# Patient Record
Sex: FEMALE | Race: WHITE | HISPANIC OR LATINO | Employment: FULL TIME | URBAN - METROPOLITAN AREA
[De-identification: names, ages, dates, MRNs, and addresses within clinical notes are randomized per-mention and may not be internally consistent; named-entity substitution may affect disease eponyms.]

---
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
## 2018-01-09 ENCOUNTER — ROUTINE PRENATAL (OUTPATIENT)
Dept: OBGYN | Facility: CLINIC | Age: 18
End: 2018-01-09
Payer: COMMERCIAL

## 2018-01-09 VITALS — DIASTOLIC BLOOD PRESSURE: 68 MMHG | SYSTOLIC BLOOD PRESSURE: 112 MMHG | WEIGHT: 229 LBS | BODY MASS INDEX: 39.31 KG/M2

## 2018-01-09 DIAGNOSIS — Z98.891 HISTORY OF PRIMARY CESAREAN SECTION: ICD-10-CM

## 2018-01-09 DIAGNOSIS — Z78.9 NOT IMMUNE TO RUBELLA: ICD-10-CM

## 2018-01-09 DIAGNOSIS — O09.93 SUPERVISION OF HIGH RISK PREGNANCY IN THIRD TRIMESTER: ICD-10-CM

## 2018-01-09 PROCEDURE — 90040 PR PRENATAL FOLLOW UP: CPT | Performed by: NURSE PRACTITIONER

## 2018-01-09 NOTE — PROGRESS NOTES
S: Joelle Morrissey at [id number] for OB  follow up visit.  Patient  Has no complaints of  Today   Fetal movement is +  Denies any loss of fluid, vaginal bleeding or contractions.    O: VSS  Urine dip  NE   See above values  Cervical exam NE    A:  multip 31w2d  High risk  HS of C/S   Desires     P:   3rd  Trimester Guidance and comfort measures, diet and exercise review.  Labs:  None due   Fetal Kick Count continue   RTC in 2 weeks   Already received flu and Tdap

## 2018-01-09 NOTE — PROGRESS NOTES
Pt here today for OB follow up  Pt states no complaints   Reports +  Good # [phone]  Pharmacy Confirmed.

## 2018-01-24 ENCOUNTER — ROUTINE PRENATAL (OUTPATIENT)
Dept: OBGYN | Facility: CLINIC | Age: 18
End: 2018-01-24
Payer: MEDICAID

## 2018-01-24 VITALS — BODY MASS INDEX: 39.65 KG/M2 | DIASTOLIC BLOOD PRESSURE: 64 MMHG | SYSTOLIC BLOOD PRESSURE: 120 MMHG | WEIGHT: 231 LBS

## 2018-01-24 DIAGNOSIS — Z98.891 HISTORY OF PRIMARY CESAREAN SECTION: ICD-10-CM

## 2018-01-24 DIAGNOSIS — Z78.9 NOT IMMUNE TO RUBELLA: ICD-10-CM

## 2018-01-24 PROCEDURE — 90040 PR PRENATAL FOLLOW UP: CPT | Performed by: PHYSICIAN ASSISTANT

## 2018-01-24 NOTE — PROGRESS NOTES
Pt has no complaints with cramping, UCs, Vb, LOF, though pt has had incr pressure and more vag d/c only. No vag itching or burning. +FM. PTL precautions reviewed. GBS next visit. Pt notes a little more stress this week as her child broke her arm and needs surgery this Friday. RTC 2 wk or sooner prn.

## 2018-02-09 ENCOUNTER — ROUTINE PRENATAL (OUTPATIENT)
Dept: OBGYN | Facility: CLINIC | Age: 18
End: 2018-02-09
Payer: MEDICAID

## 2018-02-09 VITALS — WEIGHT: 235 LBS | SYSTOLIC BLOOD PRESSURE: 128 MMHG | DIASTOLIC BLOOD PRESSURE: 70 MMHG | BODY MASS INDEX: 40.34 KG/M2

## 2018-02-09 DIAGNOSIS — O09.93 SUPERVISION OF HIGH RISK PREGNANCY IN THIRD TRIMESTER: Primary | ICD-10-CM

## 2018-02-09 DIAGNOSIS — Z78.9 NOT IMMUNE TO RUBELLA: ICD-10-CM

## 2018-02-09 DIAGNOSIS — Z98.891 HISTORY OF PRIMARY CESAREAN SECTION: ICD-10-CM

## 2018-02-09 PROCEDURE — 90040 PR PRENATAL FOLLOW UP: CPT | Performed by: PHYSICIAN ASSISTANT

## 2018-02-09 NOTE — PROGRESS NOTES
Pt has no complaints with cramping, UCs, Vb, LOF, though pt has had incr pressure only. +FM. GBS done today. Labor precautions reviewed. Daily FKC and walks recommended. Cervix: Cl/50/-3, post, med, vtx. C/S referral sent today for 40 wk, as pt hoping for TOLAC. RTC 1 wk or sooner prn.

## 2018-02-09 NOTE — PROGRESS NOTES
Pt. here for Ob f/u and GBS today. Good # [phone]  Good FM  Pt states feeling really bloated.   Pharmacy verified.

## 2018-02-15 NOTE — PROGRESS NOTES
Patient is scheduled for Pre OP with Dr Moreland on 03/06/18 at 9am  Patient is scheduled for C/S with Dr Moreland on 03/11/18 at 9:30am  Ppt notified, please give instructions next visit. Thanks.

## 2018-02-16 ENCOUNTER — ROUTINE PRENATAL (OUTPATIENT)
Dept: OBGYN | Facility: CLINIC | Age: 18
End: 2018-02-16
Payer: MEDICAID

## 2018-02-16 VITALS — WEIGHT: 239 LBS | DIASTOLIC BLOOD PRESSURE: 60 MMHG | BODY MASS INDEX: 41.02 KG/M2 | SYSTOLIC BLOOD PRESSURE: 116 MMHG

## 2018-02-16 DIAGNOSIS — Z78.9 NOT IMMUNE TO RUBELLA: ICD-10-CM

## 2018-02-16 DIAGNOSIS — O09.93 SUPERVISION OF HIGH RISK PREGNANCY IN THIRD TRIMESTER: ICD-10-CM

## 2018-02-16 DIAGNOSIS — Z98.891 HISTORY OF PRIMARY CESAREAN SECTION: ICD-10-CM

## 2018-02-16 PROCEDURE — 90040 PR PRENATAL FOLLOW UP: CPT | Performed by: PHYSICIAN ASSISTANT

## 2018-02-16 NOTE — PROGRESS NOTES
Pt 36.5wks presents with no concerns. She reports lots of FM, kicking in her stomach, no UC, LOF or VB.  She has experienced some tightening of the abdomen on one side but relieved with rest. She has noticed swelling in her feet and hands, has removed her rings.  She drinks 4L of water per day but has been craving and eating lime and joelen with salt.  Recommended decreasing salt.   Vertex presentation by Leopolds  FH 38cm  FHT 145bpm  Reviewed labor signs and symptoms of labor. Was induced last time and emergency C/S  C/S scheduled 3/11/18 and preop with Dr. Moreland scheduled 3/6/18  GBS swab repeated due to [facility] losing last swab.   Written by Twan Storey RN, FNP student    Pt care and exam overseen by myself and I agree with all the above. LAKISHA Westfall PA-C

## 2018-02-16 NOTE — PROGRESS NOTES
Pt. Here for OB/FU today. Reports Good FM.   Good # [phone]  Pt states having cramping that comes and goes.   Pharmacy verified.   Patient is scheduled for Pre OP with Dr Moreland on 03/06/18 at 9am  Patient is scheduled for C/S with Dr Moreland on 03/11/18 at 9:30am, pt notified and given instructions today.

## 2018-02-23 ENCOUNTER — ROUTINE PRENATAL (OUTPATIENT)
Dept: OBGYN | Facility: CLINIC | Age: 18
End: 2018-02-23
Payer: MEDICAID

## 2018-02-23 VITALS — DIASTOLIC BLOOD PRESSURE: 58 MMHG | SYSTOLIC BLOOD PRESSURE: 112 MMHG | BODY MASS INDEX: 40.85 KG/M2 | WEIGHT: 238 LBS

## 2018-02-23 DIAGNOSIS — Z98.891 HISTORY OF PRIMARY CESAREAN SECTION: ICD-10-CM

## 2018-02-23 DIAGNOSIS — Z78.9 NOT IMMUNE TO RUBELLA: ICD-10-CM

## 2018-02-23 DIAGNOSIS — O09.93 SUPERVISION OF HIGH RISK PREGNANCY IN THIRD TRIMESTER: Primary | ICD-10-CM

## 2018-02-23 PROCEDURE — 90040 PR PRENATAL FOLLOW UP: CPT | Performed by: NURSE PRACTITIONER

## 2018-02-23 NOTE — PROGRESS NOTES
Pt here today for OB follow up  Reports +FM  WT: 238 lb  BP: 112/58  Patient is scheduled for Pre OP with Dr Moreland on 03/06/18 at 9am. Patient is scheduled for C/S with Dr Moreland on 03/11/18 at 9:30 am. Pt notified and instructions given today.  Pt states no complaints today  Good # [phone]

## 2018-02-23 NOTE — PATIENT INSTRUCTIONS
P:  1.  Continue FKCs.         2.  Labor precautions given.  Instructions given on where to go.  Pt receptive to education.         3.  D/w pt TOLAC policy.  RCS info and pre op given to pt.        4.  Questions answered.         5.  Encouraged adequate water intake       6.  F/u 1wk

## 2018-02-23 NOTE — PROGRESS NOTES
S:  Pt is  at 37w5d here for routine OB follow up.  No c/o.  Reports good FM.  Denies VB, LOF, RUCs, or vaginal DC.     O:  Please see above vitals.        FHTs: 145        Fundal ht: 37        Fetal position: vertex        SVE: deferred        GBS neg on 18 -- reviewed w pt.      A:  IUP at 37w5d  Patient Active Problem List    Diagnosis Date Noted   • Supervision of high risk pregnancy in third trimester 2017   • Vaginal laceration 10/24/2017   • Not immune to rubella 10/18/2017   • History of C/S x 1 - desires TOLAC (consent signed 10/5/17), no BTL desired 2017       P:  1.  Continue FKCs.         2.  Labor precautions given.  Instructions given on where to go.  Pt receptive to education.         3.  D/w pt TOLAC policy.  RCS info and pre op given to pt.        4.  Questions answered.         5.  Encouraged adequate water intake       6.  F/u 1wk

## 2018-03-06 ENCOUNTER — ROUTINE PRENATAL (OUTPATIENT)
Dept: OBGYN | Facility: CLINIC | Age: 18
End: 2018-03-06
Payer: COMMERCIAL

## 2018-03-06 VITALS — BODY MASS INDEX: 41.54 KG/M2 | WEIGHT: 242 LBS | DIASTOLIC BLOOD PRESSURE: 56 MMHG | SYSTOLIC BLOOD PRESSURE: 108 MMHG

## 2018-03-06 DIAGNOSIS — Z78.9 NOT IMMUNE TO RUBELLA: ICD-10-CM

## 2018-03-06 DIAGNOSIS — Z98.891 HISTORY OF PRIMARY CESAREAN SECTION: ICD-10-CM

## 2018-03-06 PROCEDURE — 90040 PR PRENATAL FOLLOW UP: CPT | Performed by: OBSTETRICS & GYNECOLOGY

## 2018-03-06 NOTE — H&P
PREOPERATIVE DIAGNOSIS:  Previous  x1.    PLANNED PROCEDURE:  Repeat low transverse .    HISTORY AND PHYSICAL:  This is a 18-year-old G3, P1-0-1-1 whose estimated date   of delivery is  based on an 8-week ultrasound.  Patient is without   complaint.  She reports good fetal movement, no contractions, vaginal bleeding   or loss of fluid.    REVIEW OF SYSTEMS:  Negative for shortness of breath, chest pain, nausea or   vomiting or fever.    PAST MEDICAL HISTORY:  None.    PAST SURGICAL HISTORY:   x1.    MEDICATIONS:  Prenatal vitamins.    ALLERGIES:  None.    SOCIAL HISTORY:  Patient denies tobacco, ethanol or drugs.    OBSTETRICAL HISTORY:  Complicated by previous  delivery for fetal   intolerance to labor.    PHYSICAL EXAMINATION:  GENERAL:  She is a morbidly obese female weighing 242 pounds.  VITAL SIGNS:  Blood pressure is within normal limits.  HEART:  Regular rate and rhythm.  LUNGS:  Clear to auscultation bilaterally.  ABDOMEN:  Gravid with a fundal height of 39 cm.  EXTREMITIES:  Show no clubbing, cyanosis or edema.  Fetal heart tones are   documented in the 140s.    PERTINENT PREOPERATIVE LABORATORY EVALUATION:  Patient's blood type is AB   positive, antibody screen negative, platelets 313; 1-hour normal, rubella is   immune, hepatitis B immune, GC chlamydia negative, GBS negative, HIV and   syphilis screening also negative.    ASSESSMENT AND PLAN:  This is a 18-year-old G3, P1-0-1-1 with a pregnancy at   term and a history of previous  delivery.  Patient is scheduled for   repeat .  Today, I discussed with the patient indications for    and risks associated with , which can include infection,   bleeding, scar, damage to bowel, bladder or ureters, emergency blood   transfusion and emergency hysterectomy.  Patient voiced understanding of the   risks as described and agrees to proceed as scheduled.        ____________________________________     MD SOULEYMANE WATTS    DD:  03/06/2018 09:35:20  DT:  03/06/2018 09:48:15    D#:  [id number]  Job#:  [id number]

## 2018-03-06 NOTE — PROGRESS NOTES
Joelle Morrissey is a 18 y.o.  at 39w2d here today for obstetrical visit.  Patient is without complaints.    She reports good fetal movement.  She denies vaginal bleeding.  She denies rupture of membranes.  She denies contractions.     has History of C/S x 1 - desires TOLAC (consent signed 10/5/17), no BTL desired; Not immune to rubella; Vaginal laceration; and Supervision of high risk pregnancy in third trimester on her problem list.    Past medical history is reviewed and updated  Past surgical history is reviewed and updated  Medications reviewed and updated  Allergies reviewed and updated  Social history reviewed and updated  Family history reviewed and updated    Discussed with the patient indications for  delivery. The patient voiced understanding of indications for  section at this time.    Discussed with the patient the risks of  delivery. The risks include infection, bleeding, scarring, damage to other organs in the area of operation. Specifically organs that can be damaged are bowel, bladder, ureters. I also discussed with the patient the risk of wound infection and wound breakdown. We discussed that these risks are greater in people that have diabetes or obesity. I also discussed the risk of emergency blood transfusion during procedure as well as emergency hysterectomy during procedure.      Patient had the opportunity to ask questions regarding procedures. All questions answered to the patient's satisfaction.  Proceed with  delivery    A/P IUP at 39w2d  AFP done  1 hour glucola done  Rhogam done  GBS done    F/U in prn weeks

## 2018-03-06 NOTE — PROGRESS NOTES
Pre-op for repeat C/S OB F/U  Denies VB, LOF, or UC  +FM  Phone#: [phone]  Pharmacy Confirmed.  C/O: tighting  of th belly, but denies UC   Patient is scheduled for Pre OP with Dr Moreland on 03/06/18 at 9am  Patient is scheduled for C/S with Dr Moreland on 03/11/18 at 9:30am

## 2018-03-07 ENCOUNTER — APPOINTMENT (OUTPATIENT)
Dept: ADMISSIONS | Facility: MEDICAL CENTER | Age: 18
End: 2018-03-07
Attending: OBSTETRICS & GYNECOLOGY
Payer: MEDICAID

## 2018-03-11 ENCOUNTER — HOSPITAL ENCOUNTER (INPATIENT)
Facility: MEDICAL CENTER | Age: 18
LOS: 3 days | End: 2018-03-14
Attending: OBSTETRICS & GYNECOLOGY | Admitting: OBSTETRICS & GYNECOLOGY
Payer: MEDICAID

## 2018-03-11 DIAGNOSIS — G89.18 POSTOPERATIVE PAIN: ICD-10-CM

## 2018-03-11 LAB
BASOPHILS # BLD AUTO: 0.3 % (ref 0–1.8)
BASOPHILS # BLD: 0.04 K/UL (ref 0–0.12)
EOSINOPHIL # BLD AUTO: 0.1 K/UL (ref 0–0.51)
EOSINOPHIL NFR BLD: 0.8 % (ref 0–6.9)
ERYTHROCYTE [DISTWIDTH] IN BLOOD BY AUTOMATED COUNT: 43.4 FL (ref 35.9–50)
HCT VFR BLD AUTO: 40.3 % (ref 37–47)
HGB BLD-MCNC: 13.4 G/DL (ref 12–16)
HOLDING TUBE BB 8507: NORMAL
IMM GRANULOCYTES # BLD AUTO: 0.05 K/UL (ref 0–0.11)
IMM GRANULOCYTES NFR BLD AUTO: 0.4 % (ref 0–0.9)
LYMPHOCYTES # BLD AUTO: 3.54 K/UL (ref 1–4.8)
LYMPHOCYTES NFR BLD: 29.1 % (ref 22–41)
MCH RBC QN AUTO: 26.7 PG (ref 27–33)
MCHC RBC AUTO-ENTMCNC: 33.3 G/DL (ref 33.6–35)
MCV RBC AUTO: 80.3 FL (ref 81.4–97.8)
MONOCYTES # BLD AUTO: 1.2 K/UL (ref 0–0.85)
MONOCYTES NFR BLD AUTO: 9.9 % (ref 0–13.4)
NEUTROPHILS # BLD AUTO: 7.22 K/UL (ref 2–7.15)
NEUTROPHILS NFR BLD: 59.5 % (ref 44–72)
NRBC # BLD AUTO: 0 K/UL
NRBC BLD-RTO: 0 /100 WBC
PLATELET # BLD AUTO: 298 K/UL (ref 164–446)
PMV BLD AUTO: 10.7 FL (ref 9–12.9)
RBC # BLD AUTO: 5.02 M/UL (ref 4.2–5.4)
WBC # BLD AUTO: 12.2 K/UL (ref 4.8–10.8)

## 2018-03-11 PROCEDURE — 700111 HCHG RX REV CODE 636 W/ 250 OVERRIDE (IP)

## 2018-03-11 PROCEDURE — 304964 HCHG RECOVERY ROOM TIME 1HR: Performed by: OBSTETRICS & GYNECOLOGY

## 2018-03-11 PROCEDURE — 700105 HCHG RX REV CODE 258: Performed by: ANESTHESIOLOGY

## 2018-03-11 PROCEDURE — 700102 HCHG RX REV CODE 250 W/ 637 OVERRIDE(OP): Performed by: OBSTETRICS & GYNECOLOGY

## 2018-03-11 PROCEDURE — 700111 HCHG RX REV CODE 636 W/ 250 OVERRIDE (IP): Performed by: OBSTETRICS & GYNECOLOGY

## 2018-03-11 PROCEDURE — 304966 HCHG RECOVERY SVSC TIME ADDL 1/2 HR: Performed by: OBSTETRICS & GYNECOLOGY

## 2018-03-11 PROCEDURE — 700105 HCHG RX REV CODE 258: Performed by: OBSTETRICS & GYNECOLOGY

## 2018-03-11 PROCEDURE — 04QY0ZZ REPAIR LOWER ARTERY, OPEN APPROACH: ICD-10-PCS | Performed by: OBSTETRICS & GYNECOLOGY

## 2018-03-11 PROCEDURE — 36415 COLL VENOUS BLD VENIPUNCTURE: CPT

## 2018-03-11 PROCEDURE — A9270 NON-COVERED ITEM OR SERVICE: HCPCS | Performed by: OBSTETRICS & GYNECOLOGY

## 2018-03-11 PROCEDURE — 305385 HCHG SURGICAL SERVICES 1/4 HOUR: Performed by: OBSTETRICS & GYNECOLOGY

## 2018-03-11 PROCEDURE — 700102 HCHG RX REV CODE 250 W/ 637 OVERRIDE(OP): Performed by: ANESTHESIOLOGY

## 2018-03-11 PROCEDURE — 306828 HCHG ANES-TIME GENERAL: Performed by: OBSTETRICS & GYNECOLOGY

## 2018-03-11 PROCEDURE — 59514 CESAREAN DELIVERY ONLY: CPT

## 2018-03-11 PROCEDURE — 770002 HCHG ROOM/CARE - OB PRIVATE (112)

## 2018-03-11 PROCEDURE — 700111 HCHG RX REV CODE 636 W/ 250 OVERRIDE (IP): Performed by: ANESTHESIOLOGY

## 2018-03-11 PROCEDURE — 85025 COMPLETE CBC W/AUTO DIFF WBC: CPT

## 2018-03-11 RX ORDER — NALOXONE HYDROCHLORIDE 0.4 MG/ML
0.1 INJECTION, SOLUTION INTRAMUSCULAR; INTRAVENOUS; SUBCUTANEOUS PRN
Status: DISCONTINUED | OUTPATIENT
Start: 2018-03-11 | End: 2018-03-12

## 2018-03-11 RX ORDER — MISOPROSTOL 200 UG/1
600 TABLET ORAL
Status: DISCONTINUED | OUTPATIENT
Start: 2018-03-11 | End: 2018-03-14 | Stop reason: HOSPADM

## 2018-03-11 RX ORDER — HYDROMORPHONE HYDROCHLORIDE 2 MG/ML
0.4 INJECTION, SOLUTION INTRAMUSCULAR; INTRAVENOUS; SUBCUTANEOUS
Status: ACTIVE | OUTPATIENT
Start: 2018-03-11 | End: 2018-03-12

## 2018-03-11 RX ORDER — CARBOPROST TROMETHAMINE 250 UG/ML
250 INJECTION, SOLUTION INTRAMUSCULAR
Status: DISCONTINUED | OUTPATIENT
Start: 2018-03-11 | End: 2018-03-11 | Stop reason: HOSPADM

## 2018-03-11 RX ORDER — MISOPROSTOL 200 UG/1
800 TABLET ORAL
Status: DISCONTINUED | OUTPATIENT
Start: 2018-03-11 | End: 2018-03-11

## 2018-03-11 RX ORDER — METHYLERGONOVINE MALEATE 0.2 MG/ML
0.2 INJECTION INTRAVENOUS
Status: DISCONTINUED | OUTPATIENT
Start: 2018-03-11 | End: 2018-03-14 | Stop reason: HOSPADM

## 2018-03-11 RX ORDER — MISOPROSTOL 200 UG/1
800 TABLET ORAL
Status: DISCONTINUED | OUTPATIENT
Start: 2018-03-11 | End: 2018-03-11 | Stop reason: HOSPADM

## 2018-03-11 RX ORDER — METOCLOPRAMIDE HYDROCHLORIDE 5 MG/ML
10 INJECTION INTRAMUSCULAR; INTRAVENOUS ONCE
Status: COMPLETED | OUTPATIENT
Start: 2018-03-11 | End: 2018-03-11

## 2018-03-11 RX ORDER — CITRIC ACID/SODIUM CITRATE 334-500MG
30 SOLUTION, ORAL ORAL ONCE
Status: COMPLETED | OUTPATIENT
Start: 2018-03-11 | End: 2018-03-11

## 2018-03-11 RX ORDER — KETOROLAC TROMETHAMINE 30 MG/ML
30 INJECTION, SOLUTION INTRAMUSCULAR; INTRAVENOUS EVERY 6 HOURS
Status: DISPENSED | OUTPATIENT
Start: 2018-03-11 | End: 2018-03-12

## 2018-03-11 RX ORDER — CEFAZOLIN SODIUM 1 G/3ML
1 INJECTION, POWDER, FOR SOLUTION INTRAMUSCULAR; INTRAVENOUS ONCE
Status: DISCONTINUED | OUTPATIENT
Start: 2018-03-11 | End: 2018-03-11

## 2018-03-11 RX ORDER — HYDROMORPHONE HYDROCHLORIDE 2 MG/ML
0.2 INJECTION, SOLUTION INTRAMUSCULAR; INTRAVENOUS; SUBCUTANEOUS
Status: ACTIVE | OUTPATIENT
Start: 2018-03-11 | End: 2018-03-12

## 2018-03-11 RX ORDER — SODIUM CHLORIDE, SODIUM LACTATE, POTASSIUM CHLORIDE, CALCIUM CHLORIDE 600; 310; 30; 20 MG/100ML; MG/100ML; MG/100ML; MG/100ML
INJECTION, SOLUTION INTRAVENOUS PRN
Status: DISCONTINUED | OUTPATIENT
Start: 2018-03-11 | End: 2018-03-14 | Stop reason: HOSPADM

## 2018-03-11 RX ORDER — METHYLERGONOVINE MALEATE 0.2 MG/ML
0.2 INJECTION INTRAVENOUS
Status: DISCONTINUED | OUTPATIENT
Start: 2018-03-11 | End: 2018-03-11

## 2018-03-11 RX ORDER — METOCLOPRAMIDE HYDROCHLORIDE 5 MG/ML
10 INJECTION INTRAMUSCULAR; INTRAVENOUS EVERY 6 HOURS PRN
Status: DISCONTINUED | OUTPATIENT
Start: 2018-03-11 | End: 2018-03-12

## 2018-03-11 RX ORDER — SIMETHICONE 80 MG
80 TABLET,CHEWABLE ORAL 4 TIMES DAILY PRN
Status: DISCONTINUED | OUTPATIENT
Start: 2018-03-11 | End: 2018-03-14 | Stop reason: HOSPADM

## 2018-03-11 RX ORDER — SODIUM CHLORIDE, SODIUM LACTATE, POTASSIUM CHLORIDE, CALCIUM CHLORIDE 600; 310; 30; 20 MG/100ML; MG/100ML; MG/100ML; MG/100ML
INJECTION, SOLUTION INTRAVENOUS CONTINUOUS
Status: DISCONTINUED | OUTPATIENT
Start: 2018-03-11 | End: 2018-03-11 | Stop reason: HOSPADM

## 2018-03-11 RX ORDER — DOCUSATE SODIUM 100 MG/1
100 CAPSULE, LIQUID FILLED ORAL 2 TIMES DAILY PRN
Status: DISCONTINUED | OUTPATIENT
Start: 2018-03-11 | End: 2018-03-14 | Stop reason: HOSPADM

## 2018-03-11 RX ORDER — OXYCODONE AND ACETAMINOPHEN 10; 325 MG/1; MG/1
1 TABLET ORAL EVERY 4 HOURS PRN
Status: DISPENSED | OUTPATIENT
Start: 2018-03-11 | End: 2018-03-12

## 2018-03-11 RX ORDER — DIPHENHYDRAMINE HYDROCHLORIDE 50 MG/ML
12.5 INJECTION INTRAMUSCULAR; INTRAVENOUS EVERY 6 HOURS PRN
Status: DISCONTINUED | OUTPATIENT
Start: 2018-03-11 | End: 2018-03-12

## 2018-03-11 RX ORDER — CEFAZOLIN SODIUM 1 G/3ML
1 INJECTION, POWDER, FOR SOLUTION INTRAMUSCULAR; INTRAVENOUS ONCE
Status: DISCONTINUED | OUTPATIENT
Start: 2018-03-11 | End: 2018-03-11 | Stop reason: HOSPADM

## 2018-03-11 RX ORDER — CARBOPROST TROMETHAMINE 250 UG/ML
250 INJECTION, SOLUTION INTRAMUSCULAR
Status: DISCONTINUED | OUTPATIENT
Start: 2018-03-11 | End: 2018-03-14 | Stop reason: HOSPADM

## 2018-03-11 RX ORDER — SODIUM CHLORIDE, SODIUM LACTATE, POTASSIUM CHLORIDE, CALCIUM CHLORIDE 600; 310; 30; 20 MG/100ML; MG/100ML; MG/100ML; MG/100ML
1500 INJECTION, SOLUTION INTRAVENOUS ONCE
Status: COMPLETED | OUTPATIENT
Start: 2018-03-11 | End: 2018-03-11

## 2018-03-11 RX ORDER — METHYLERGONOVINE MALEATE 0.2 MG/ML
0.2 INJECTION INTRAVENOUS
Status: DISCONTINUED | OUTPATIENT
Start: 2018-03-11 | End: 2018-03-11 | Stop reason: HOSPADM

## 2018-03-11 RX ORDER — OXYCODONE HYDROCHLORIDE AND ACETAMINOPHEN 5; 325 MG/1; MG/1
1 TABLET ORAL EVERY 4 HOURS PRN
Status: DISPENSED | OUTPATIENT
Start: 2018-03-11 | End: 2018-03-12

## 2018-03-11 RX ORDER — DIPHENHYDRAMINE HYDROCHLORIDE 50 MG/ML
25 INJECTION INTRAMUSCULAR; INTRAVENOUS EVERY 6 HOURS PRN
Status: DISCONTINUED | OUTPATIENT
Start: 2018-03-11 | End: 2018-03-12

## 2018-03-11 RX ADMIN — FENTANYL CITRATE 50 MCG: 50 INJECTION, SOLUTION INTRAMUSCULAR; INTRAVENOUS at 13:24

## 2018-03-11 RX ADMIN — Medication 125 ML/HR: at 14:30

## 2018-03-11 RX ADMIN — SODIUM CITRATE AND CITRIC ACID MONOHYDRATE 30 ML: 500; 334 SOLUTION ORAL at 10:56

## 2018-03-11 RX ADMIN — FAMOTIDINE 20 MG: 10 INJECTION, SOLUTION INTRAVENOUS at 10:56

## 2018-03-11 RX ADMIN — OXYCODONE HYDROCHLORIDE AND ACETAMINOPHEN 1 TABLET: 10; 325 TABLET ORAL at 16:11

## 2018-03-11 RX ADMIN — SODIUM CHLORIDE, POTASSIUM CHLORIDE, SODIUM LACTATE AND CALCIUM CHLORIDE: 600; 310; 30; 20 INJECTION, SOLUTION INTRAVENOUS at 10:57

## 2018-03-11 RX ADMIN — KETOROLAC TROMETHAMINE 30 MG: 30 INJECTION, SOLUTION INTRAMUSCULAR at 17:36

## 2018-03-11 RX ADMIN — SODIUM CHLORIDE, POTASSIUM CHLORIDE, SODIUM LACTATE AND CALCIUM CHLORIDE 1000 ML: 600; 310; 30; 20 INJECTION, SOLUTION INTRAVENOUS at 08:40

## 2018-03-11 RX ADMIN — METOCLOPRAMIDE 10 MG: 5 INJECTION, SOLUTION INTRAMUSCULAR; INTRAVENOUS at 10:56

## 2018-03-11 RX ADMIN — Medication 20 UNITS: at 13:25

## 2018-03-11 ASSESSMENT — PAIN SCALES - GENERAL
PAINLEVEL_OUTOF10: 0
PAINLEVEL_OUTOF10: 5
PAINLEVEL_OUTOF10: 3
PAINLEVEL_OUTOF10: 3
PAINLEVEL_OUTOF10: 0
PAINLEVEL_OUTOF10: 4
PAINLEVEL_OUTOF10: 0

## 2018-03-11 ASSESSMENT — PATIENT HEALTH QUESTIONNAIRE - PHQ9
SUM OF ALL RESPONSES TO PHQ9 QUESTIONS 1 AND 2: 0
2. FEELING DOWN, DEPRESSED, IRRITABLE, OR HOPELESS: NOT AT ALL
1. LITTLE INTEREST OR PLEASURE IN DOING THINGS: NOT AT ALL
SUM OF ALL RESPONSES TO PHQ QUESTIONS 1-9: 0

## 2018-03-11 ASSESSMENT — LIFESTYLE VARIABLES
ALCOHOL_USE: NO
DO YOU DRINK ALCOHOL: NO
EVER_SMOKED: NEVER

## 2018-03-11 ASSESSMENT — COPD QUESTIONNAIRES
DURING THE PAST 4 WEEKS HOW MUCH DID YOU FEEL SHORT OF BREATH: NONE/LITTLE OF THE TIME
HAVE YOU SMOKED AT LEAST 100 CIGARETTES IN YOUR ENTIRE LIFE: NO/DON'T KNOW
COPD SCREENING SCORE: 0
DO YOU EVER COUGH UP ANY MUCUS OR PHLEGM?: NO/ONLY WITH OCCASIONAL COLDS OR INFECTIONS

## 2018-03-11 NOTE — OP REPORT
DATE OF SERVICE:  2018    PROCEDURE PERFORMED:  Repeat low transverse .    PREOPERATIVE DIAGNOSIS:  Intrauterine pregnancy at term with a history of   previous .    POSTOPERATIVE DIAGNOSIS:  Intrauterine pregnancy at term with a history of   previous .    SURGEON:  Brandy Moreland MD    ASSISTANT:  Shari Rahman MD    ESTIMATED BLOOD LOSS:  500 mL    ANESTHESIA:  Spinal anesthesia provided by Dr. Jensen.    INTRAOPERATIVE FINDINGS:  A male infant, vertex presentation, Apgars of 8 and   9.  Normal uterus, tubes, and ovaries.    PROCEDURE IN DETAIL:  After informed consent had been obtained, the patient   was taken to the operating room where spinal anesthesia was found to be   adequate.  She was prepped and draped in usual sterile fashion and a   Pfannenstiel skin incision made around the previous incision and the previous   incision was then excised.  The incision was then carried down to the   underlying layer of fascia with the knife.  The fascia was then nicked in the   midline, elevated the fascial incision, extended laterally on both sides with   Camacho scissors.  Rectus muscles were then dissected off the posterior aspect of   fascia both superiorly and inferiorly, they were  in the midline.    Peritoneum was elevated between 2 hemostats, entered sharply with Metzenbaum   scissors.  Peritoneal incision was then extended superiorly and inferiorly   with good visualization of bowel and bladder.  Vesicouterine peritoneum   identified and a bladder flap created.  Uterus was incised in a low transverse   fashion and extended bluntly.  The male infant delivered through the incision   without difficulty.  The cord was clamped x2 and cut and the infant handed to   awaiting nursing staff.  The placenta was delivered manually.  The uterus was   then exteriorized, cleared of all clots and debris with a moist lap sponge.    On the left side, there was a laceration of the  uterine artery.  This was   repaired with multiple figure-of-eight #1 chromic sutures until hemostasis had   been achieved.  Uterine incision was then closed with #1 chromic in a running   lock fashion.  Once hemostasis had been achieved, the uterus returned to the   peritoneal cavity and the abdomen was irrigated and cleared of all clots and   debris with a moist lap sponge.  The peritoneum then closed with 0 Vicryl in a   running fashion, the fascia closed with 0 Vicryl in a running fashion.  The   subcutaneous layer was checked for hemostasis and noted to be adequate,   reapproximated with 2-0 plain, and the skin was closed with staples.  Sponge,   lap, needle counts were all correct x2, and the patient was taken to the   recovery room in good condition.       ____________________________________     MD SOULEYMANE WATTS / [place]    DD:  03/11/2018 12:30:01  DT:  03/11/2018 13:35:31    D#:  [id number]  Job#:  [id number]

## 2018-03-11 NOTE — PROGRESS NOTES
0930: Pt care assumed; report received from Vesta TAI. Pt's case is being bumped for emergency case; pt informed  1111: In OR, see OR charting

## 2018-03-11 NOTE — PROGRESS NOTES
Report received from ZAIRE White. Pt is AAOx4, FOB and NB at bedside. Assessment completed. Fundus is firm @U, lochia light. Pate draining clear, yellow urine to gravity. PIV patent, pitocin running per MAR (2nd bag). Pt complaining of pain 5/10 in her abdomen.  Medicated per MAR. Pt ambulates assistance of 1 person. Pt educated regarding plan of care, including pain management, NB care, and safety. All questions answered. Call light and personal belongings in reach. No additional needs at this time.

## 2018-03-11 NOTE — OR SURGEON
Immediate Post OP Note    PreOp Diagnosis: pregnancy at term previous c section    PostOp Diagnosis: same    Procedure(s):  REPEAT C SECTION - Wound Class: Clean Contaminated    Surgeon(s):  THANIA Lo MD    Anesthesiologist/Type of Anesthesia:  Anesthesiologist: Pedro Pablo Jensen M.D./Spinal    Surgical Staff:  Circulator: Cindy Valente R.N.  Scrub Person: Kenisha Mcconnell  L&HOSEA Baby  Nurse: Dorothea Chavarria R.N.    Specimens:  * No specimens in log *    Estimated Blood Loss: 500cc    Findings: male presentation vertex APGAR 8/9  Uterus Normal, Tubes Normal, ovaries Normal        Complications: none        3/11/2018 12:24 PM Brandy Moreland

## 2018-03-12 LAB
ERYTHROCYTE [DISTWIDTH] IN BLOOD BY AUTOMATED COUNT: 42.9 FL (ref 35.9–50)
HCT VFR BLD AUTO: 31.2 % (ref 37–47)
HGB BLD-MCNC: 10.4 G/DL (ref 12–16)
MCH RBC QN AUTO: 26.5 PG (ref 27–33)
MCHC RBC AUTO-ENTMCNC: 33.3 G/DL (ref 33.6–35)
MCV RBC AUTO: 79.6 FL (ref 81.4–97.8)
PLATELET # BLD AUTO: 238 K/UL (ref 164–446)
PMV BLD AUTO: 10.4 FL (ref 9–12.9)
RBC # BLD AUTO: 3.92 M/UL (ref 4.2–5.4)
WBC # BLD AUTO: 13.3 K/UL (ref 4.8–10.8)

## 2018-03-12 PROCEDURE — 85027 COMPLETE CBC AUTOMATED: CPT

## 2018-03-12 PROCEDURE — 770002 HCHG ROOM/CARE - OB PRIVATE (112)

## 2018-03-12 PROCEDURE — A9270 NON-COVERED ITEM OR SERVICE: HCPCS | Performed by: OBSTETRICS & GYNECOLOGY

## 2018-03-12 PROCEDURE — 700111 HCHG RX REV CODE 636 W/ 250 OVERRIDE (IP): Performed by: OBSTETRICS & GYNECOLOGY

## 2018-03-12 PROCEDURE — 36415 COLL VENOUS BLD VENIPUNCTURE: CPT

## 2018-03-12 PROCEDURE — 700102 HCHG RX REV CODE 250 W/ 637 OVERRIDE(OP): Performed by: OBSTETRICS & GYNECOLOGY

## 2018-03-12 RX ORDER — OXYCODONE HYDROCHLORIDE 5 MG/1
5 TABLET ORAL EVERY 4 HOURS PRN
Status: DISCONTINUED | OUTPATIENT
Start: 2018-03-12 | End: 2018-03-14 | Stop reason: HOSPADM

## 2018-03-12 RX ORDER — OXYCODONE HYDROCHLORIDE 10 MG/1
10 TABLET ORAL EVERY 4 HOURS PRN
Status: DISCONTINUED | OUTPATIENT
Start: 2018-03-12 | End: 2018-03-14 | Stop reason: HOSPADM

## 2018-03-12 RX ORDER — MORPHINE SULFATE 4 MG/ML
4 INJECTION, SOLUTION INTRAMUSCULAR; INTRAVENOUS
Status: DISCONTINUED | OUTPATIENT
Start: 2018-03-12 | End: 2018-03-14 | Stop reason: HOSPADM

## 2018-03-12 RX ORDER — ONDANSETRON 4 MG/1
4 TABLET, ORALLY DISINTEGRATING ORAL EVERY 6 HOURS PRN
Status: DISCONTINUED | OUTPATIENT
Start: 2018-03-12 | End: 2018-03-14 | Stop reason: HOSPADM

## 2018-03-12 RX ORDER — ONDANSETRON 2 MG/ML
4 INJECTION INTRAMUSCULAR; INTRAVENOUS EVERY 6 HOURS PRN
Status: DISCONTINUED | OUTPATIENT
Start: 2018-03-12 | End: 2018-03-14 | Stop reason: HOSPADM

## 2018-03-12 RX ADMIN — OXYCODONE HYDROCHLORIDE 5 MG: 5 TABLET ORAL at 22:23

## 2018-03-12 RX ADMIN — OXYCODONE HYDROCHLORIDE AND ACETAMINOPHEN 1 TABLET: 10; 325 TABLET ORAL at 11:45

## 2018-03-12 RX ADMIN — OXYCODONE HYDROCHLORIDE 5 MG: 5 TABLET ORAL at 16:44

## 2018-03-12 RX ADMIN — OXYCODONE HYDROCHLORIDE AND ACETAMINOPHEN 1 TABLET: 5; 325 TABLET ORAL at 04:00

## 2018-03-12 RX ADMIN — KETOROLAC TROMETHAMINE 30 MG: 30 INJECTION, SOLUTION INTRAMUSCULAR at 00:37

## 2018-03-12 RX ADMIN — KETOROLAC TROMETHAMINE 30 MG: 30 INJECTION, SOLUTION INTRAMUSCULAR at 06:27

## 2018-03-12 ASSESSMENT — PAIN SCALES - GENERAL
PAINLEVEL_OUTOF10: 4
PAINLEVEL_OUTOF10: 2
PAINLEVEL_OUTOF10: 7
PAINLEVEL_OUTOF10: 5
PAINLEVEL_OUTOF10: 2
PAINLEVEL_OUTOF10: 3
PAINLEVEL_OUTOF10: 3
PAINLEVEL_OUTOF10: 2
PAINLEVEL_OUTOF10: 3
PAINLEVEL_OUTOF10: 7

## 2018-03-12 ASSESSMENT — COPD QUESTIONNAIRES
HAVE YOU SMOKED AT LEAST 100 CIGARETTES IN YOUR ENTIRE LIFE: NO/DON'T KNOW
COPD SCREENING SCORE: 0
DO YOU EVER COUGH UP ANY MUCUS OR PHLEGM?: NO/ONLY WITH OCCASIONAL COLDS OR INFECTIONS
DURING THE PAST 4 WEEKS HOW MUCH DID YOU FEEL SHORT OF BREATH: NONE/LITTLE OF THE TIME

## 2018-03-12 NOTE — PROGRESS NOTES
Post Partum Progress Note    Name:   Joelle Morrissey   Date/Time:  3/12/2018 - 6:40 AM  Chief Admitting Dx:  Pregnancy  40 WEEKS    delivery delivered  Labor and delivery, indication for care  Delivery Type:   for repeat  Post-Op/Post Partum Days #:  1    Subjective:  Abdominal pain: no  Ambulating:   yes  Tolerating liquids:  yes  Tolerating food:  No  Flatus:   no  BM:    no  Bleeding:   with a small amount of bleeding  Voiding:   no  Dizziness:   no  Feeding:   breast    Vitals:    18 0200 18 0300 18 0400 18 0500   BP:   116/58    Pulse: 84 91 94 95   Resp:    Temp:   36.8 °C (98.3 °F)    SpO2: 96% 96% 96% 97%   Weight:       Height:           Exam:  Breast: Tenderness no  Abdomen: Abdomen soft, non-tender. BS normal. No masses,  No organomegaly  Fundal Tenderness:  no  Fundus Firm: yes  Incision: dry and intact  Below umbilicus: yes  Perineum: perineum intact  Lochia: mild  Extremities: Normal extremities, peripheral pulses and reflexes normal    Meds:  Current Facility-Administered Medications   Medication Dose   • oxytocin (PITOCIN) infusion (for postpartum)   mL/hr   • oxytocin (PITOCIN) infusion (for postpartum)  2,000 mL/hr    Followed by   • oxytocin (PITOCIN) infusion (for postpartum)   mL/hr   • LR infusion     • PRN oxytocin (PITOCIN) (20 Units/1000 mL) PRN for excessive uterine bleeding - See Admin Instr  125-999 mL/hr   • miSOPROStol (CYTOTEC) tablet 600 mcg  600 mcg   • methylergonovine (METHERGINE) injection 0.2 mg  0.2 mg   • carboPROST (HEMABATE) injection 250 mcg  250 mcg   • docusate sodium (COLACE) capsule 100 mg  100 mg   • simethicone (MYLICON) chewable tab 80 mg  80 mg   • fentaNYL (SUBLIMAZE) injection 50 mcg  50 mcg   • naloxone (NARCAN) injection 0.1 mg  0.1 mg   • ketorolac (TORADOL) injection 30 mg  30 mg   • oxyCODONE-acetaminophen (PERCOCET) 5-325 MG per tablet 1 Tab  1 Tab   • oxyCODONE-acetaminophen  (PERCOCET-10)  MG per tablet 1 Tab  1 Tab   • HYDROmorphone (DILAUDID) injection 0.2 mg  0.2 mg   • HYDROmorphone (DILAUDID) injection 0.4 mg  0.4 mg   • ePHEDrine injection 10 mg  10 mg   • metoclopramide (REGLAN) injection 10 mg  10 mg   • naloxone (NARCAN) 0.4 mg in NS 1,000 mL infusion  0.4 mg   • diphenhydrAMINE (BENADRYL) injection 12.5 mg  12.5 mg   • diphenhydrAMINE (BENADRYL) injection 25 mg  25 mg   • naloxone (NARCAN) 0.4 mg in NS 1,000 mL infusion  0.4 mg       Labs:   Recent Labs      18   0830  18   0342   WBC  12.2*  13.3*   RBC  5.02  3.92*   HEMOGLOBIN  13.4  10.4*   HEMATOCRIT  40.3  31.2*   MCV  80.3*  79.6*   MCH  26.7*  26.5*   MCHC  33.3*  33.3*   RDW  43.4  42.9   PLATELETCT  298  238   MPV  10.7  10.4       Assessment:  Chief Admitting Dx:  Pregnancy  40 WEEKS    delivery delivered  Labor and delivery, indication for care  Delivery Type:   for repeat  Tubal Ligation:  no    Plan:  Continue routine post partum care.      Brandy Moreland M.D.

## 2018-03-12 NOTE — CARE PLAN
Problem: Knowledge Deficit  Goal: Knowledge of disease process/condition, treatment plan, diagnostic tests, and medications will improve  Education done with pt on s/s of hemorrhage and infection, all questions and concerns were addressed, pt verbalizes an understanding     Problem: Pain Management  Goal: Pain level will decrease to patient's comfort goal  Pain well controlled at this time with PO pain medication per pt

## 2018-03-12 NOTE — PROGRESS NOTES
A&Ox4, pleasant. SO at bedside, also pleasant. Denies pain. Fundus firm, lochia light. Abdominal surgical incision assessed, no s/s of infection, dressing CDI. R AC PIV assessed, patent, no s/s of infection/infiltration, saline locked. POC discussed, all questions and concerns were addressed. VSS. Labs stable. Ill continue to monitor.

## 2018-03-12 NOTE — PROGRESS NOTES
Received report from Lee TAI.  Assumed patient care. Assessment complete. Pt has second bag of pitocin running at 125 mL/hr. Urine output is light in color.  Advised pt that we will be getting her up to walk to the bathroom.    2100: pt refused to get up. Pt stated she wanted to wait until her midnight check.    0000: pt refused to get up because she was tired. toradol was given Per MAR.  0124: pt got up to walk with Julia TAI. Per report, pt walked with a steady gait. Kali care performed. New underwear and kali pad in place.

## 2018-03-12 NOTE — CARE PLAN
Problem: Altered physiologic condition related to postoperative  delivery  Goal: Patient physiologically stable as evidenced by normal lochia, palpable uterine involution and vital signs within normal limits  Outcome: PROGRESSING AS EXPECTED  Fundus firm, lochia light rubra. Small trickle with fundal rub. Pt educated of bleeding and lochia changes.    Problem: Alteration in comfort related to surgical incision and/or after birth pains  Goal: Patient verbalizes acceptable pain level  Outcome: PROGRESSING AS EXPECTED  Reviewed 0-10 pain scale and available pain meds with pt. Pt will call for pain meds as needed.

## 2018-03-13 PROCEDURE — 700102 HCHG RX REV CODE 250 W/ 637 OVERRIDE(OP): Performed by: OBSTETRICS & GYNECOLOGY

## 2018-03-13 PROCEDURE — 770002 HCHG ROOM/CARE - OB PRIVATE (112)

## 2018-03-13 PROCEDURE — A9270 NON-COVERED ITEM OR SERVICE: HCPCS | Performed by: OBSTETRICS & GYNECOLOGY

## 2018-03-13 PROCEDURE — 700112 HCHG RX REV CODE 229: Performed by: OBSTETRICS & GYNECOLOGY

## 2018-03-13 RX ORDER — IBUPROFEN 600 MG/1
600 TABLET ORAL EVERY 6 HOURS PRN
Status: DISCONTINUED | OUTPATIENT
Start: 2018-03-13 | End: 2018-03-14 | Stop reason: HOSPADM

## 2018-03-13 RX ADMIN — IBUPROFEN 600 MG: 600 TABLET, FILM COATED ORAL at 12:50

## 2018-03-13 RX ADMIN — OXYCODONE HYDROCHLORIDE 5 MG: 5 TABLET ORAL at 02:40

## 2018-03-13 RX ADMIN — DOCUSATE SODIUM 100 MG: 100 CAPSULE ORAL at 12:50

## 2018-03-13 RX ADMIN — OXYCODONE HYDROCHLORIDE 5 MG: 5 TABLET ORAL at 06:44

## 2018-03-13 RX ADMIN — OXYCODONE HYDROCHLORIDE 5 MG: 5 TABLET ORAL at 12:50

## 2018-03-13 RX ADMIN — OXYCODONE HYDROCHLORIDE 10 MG: 10 TABLET ORAL at 19:07

## 2018-03-13 RX ADMIN — IBUPROFEN 600 MG: 600 TABLET, FILM COATED ORAL at 06:45

## 2018-03-13 RX ADMIN — IBUPROFEN 600 MG: 600 TABLET, FILM COATED ORAL at 19:05

## 2018-03-13 ASSESSMENT — PAIN SCALES - GENERAL
PAINLEVEL_OUTOF10: 4
PAINLEVEL_OUTOF10: 7
PAINLEVEL_OUTOF10: 4
PAINLEVEL_OUTOF10: 4

## 2018-03-13 NOTE — CARE PLAN
Problem: Potential for postpartum infection related to surgical incision  Goal: Patient will be absent from signs and symptoms of infection   Outcome: PROGRESSING AS EXPECTED   VSS     Problem: Potential knowledge deficit related to lack of understanding of self and  care   Goal: Patient will demonstrate ability to care for self and infant   Outcome: PROGRESSING AS EXPECTED   MOB attentive to infant needs

## 2018-03-13 NOTE — PROGRESS NOTES
Assessment done. Fundus firm, lochia light, vital signs stable. Incision clean, dry, intact & open to air. Pt states passing gas & voiding without difficulty.  Pt denies complaints of pain, see MAR.  FOB at bedside bonding with pt/baby. Pt aware of dangers related to sleeping with infant, reviewed plan of care with pt; Call light in place, encouraged to call with needs

## 2018-03-13 NOTE — CARE PLAN
Problem: Alteration in comfort related to surgical incision and/or after birth pains  Goal: Patient is able to ambulate, care for self and infant with acceptable pain level  Outcome: PROGRESSING AS EXPECTED  Pt is ambulating with a steady gait. She is able to get herself up to the bathroom and to the baby's crib without assistance.    Problem: Potential knowledge deficit related to lack of understanding of self and  care  Goal: Patient will demonstrate ability to care for self and infant  Outcome: PROGRESSING AS EXPECTED  Patient demonstrated knowledge in care for herself and infant with feeding, changing diaper, and comforting.

## 2018-03-13 NOTE — PROGRESS NOTES
Received report from Renee TAI.  Assumed patient care. Assessment complete. Pt showered and abdominal dressing was removed. Pt has staples at incision and open to air. Will continue postpartum care.

## 2018-03-13 NOTE — PROGRESS NOTES
Post Partum Progress Note    Name:   Joelle Morrissey   Date/Time:  3/13/2018 - 6:15 AM  Chief Admitting Dx:  Pregnancy  40 WEEKS    delivery delivered  Labor and delivery, indication for care  Delivery Type:   for repeat  Post-Op/Post Partum Days #:  2    Subjective:  Abdominal pain: yes  Ambulating:   yes  Tolerating liquids:  yes  Tolerating food:  yes common adult  Flatus:   yes  BM:    no  Bleeding:   with a small amount of bleeding  Voiding:   yes  Dizziness:   no  Feeding:   breast    Vitals:    18 0400 18 0500 18 0800 18   BP: 116/58  102/63 118/78   Pulse: 94 95 (!) 102 (!) 110   Resp:    Temp: 36.8 °C (98.3 °F)  36.8 °C (98.3 °F) 37.5 °C (99.5 °F)   SpO2: 96% 97% 92% 96%   Weight:       Height:           Exam:  Breast: Tenderness yes, Engorged no and Lactating yes  Abdomen: Abdomen soft, non-tender. BS normal. No masses,  No organomegaly  Fundal Tenderness:  yes  Fundus Firm: yes  Incision: dry and intact, extruding a piece of suture, no evidence of infection, separation or keloid formation.  Below umbilicus: yes  Perineum: perineum intact  Lochia: mild  Extremities: Normal extremities, peripheral pulses and reflexes normal    Meds:  Current Facility-Administered Medications   Medication Dose   • oxyCODONE immediate-release (ROXICODONE) tablet 5 mg  5 mg   • oxyCODONE immediate release (ROXICODONE) tablet 10 mg  10 mg   • morphine (pf) 4 mg/ml injection 4 mg  4 mg   • ondansetron (ZOFRAN) syringe/vial injection 4 mg  4 mg    Or   • ondansetron (ZOFRAN ODT) dispertab 4 mg  4 mg   • oxytocin (PITOCIN) infusion (for postpartum)   mL/hr   • oxytocin (PITOCIN) infusion (for postpartum)   mL/hr   • LR infusion     • PRN oxytocin (PITOCIN) (20 Units/1000 mL) PRN for excessive uterine bleeding - See Admin Instr  125-999 mL/hr   • miSOPROStol (CYTOTEC) tablet 600 mcg  600 mcg   • methylergonovine (METHERGINE) injection 0.2 mg  0.2 mg   •  carboPROST (HEMABATE) injection 250 mcg  250 mcg   • docusate sodium (COLACE) capsule 100 mg  100 mg   • simethicone (MYLICON) chewable tab 80 mg  80 mg   • fentaNYL (SUBLIMAZE) injection 50 mcg  50 mcg       Labs:   Recent Labs      18   0830  18   0342   WBC  12.2*  13.3*   RBC  5.02  3.92*   HEMOGLOBIN  13.4  10.4*   HEMATOCRIT  40.3  31.2*   MCV  80.3*  79.6*   MCH  26.7*  26.5*   MCHC  33.3*  33.3*   RDW  43.4  42.9   PLATELETCT  298  238   MPV  10.7  10.4       Assessment:  Chief Admitting Dx:  Pregnancy  40 WEEKS    delivery delivered  Labor and delivery, indication for care  Delivery Type:   for repeat  Tubal Ligation:  no    Plan:  Continue routine post partum care.  Feso4     Lee Whitlock M.D.

## 2018-03-14 VITALS
WEIGHT: 242 LBS | OXYGEN SATURATION: 93 % | HEIGHT: 62 IN | HEART RATE: 83 BPM | TEMPERATURE: 98 F | SYSTOLIC BLOOD PRESSURE: 103 MMHG | RESPIRATION RATE: 118 BRPM | BODY MASS INDEX: 44.53 KG/M2 | DIASTOLIC BLOOD PRESSURE: 68 MMHG

## 2018-03-14 PROBLEM — G89.18 POSTOPERATIVE PAIN: Status: ACTIVE | Noted: 2018-03-14

## 2018-03-14 PROCEDURE — A9270 NON-COVERED ITEM OR SERVICE: HCPCS | Performed by: OBSTETRICS & GYNECOLOGY

## 2018-03-14 PROCEDURE — 700102 HCHG RX REV CODE 250 W/ 637 OVERRIDE(OP): Performed by: OBSTETRICS & GYNECOLOGY

## 2018-03-14 PROCEDURE — 700112 HCHG RX REV CODE 229: Performed by: OBSTETRICS & GYNECOLOGY

## 2018-03-14 RX ORDER — IBUPROFEN 800 MG/1
800 TABLET ORAL EVERY 8 HOURS PRN
Qty: 30 TAB | Refills: 1 | Status: SHIPPED | OUTPATIENT
Start: 2018-03-14 | End: 2020-04-08

## 2018-03-14 RX ORDER — OXYCODONE HYDROCHLORIDE AND ACETAMINOPHEN 5; 325 MG/1; MG/1
1 TABLET ORAL EVERY 4 HOURS PRN
Qty: 30 TAB | Refills: 0 | Status: SHIPPED | OUTPATIENT
Start: 2018-03-14 | End: 2018-03-19

## 2018-03-14 RX ORDER — PSEUDOEPHEDRINE HCL 30 MG
100 TABLET ORAL 2 TIMES DAILY PRN
Qty: 60 CAP | Refills: 1 | Status: SHIPPED | OUTPATIENT
Start: 2018-03-14 | End: 2020-04-08

## 2018-03-14 RX ADMIN — IBUPROFEN 600 MG: 600 TABLET, FILM COATED ORAL at 05:18

## 2018-03-14 RX ADMIN — OXYCODONE HYDROCHLORIDE 10 MG: 10 TABLET ORAL at 00:43

## 2018-03-14 RX ADMIN — DOCUSATE SODIUM 100 MG: 100 CAPSULE ORAL at 05:19

## 2018-03-14 RX ADMIN — OXYCODONE HYDROCHLORIDE 5 MG: 5 TABLET ORAL at 05:19

## 2018-03-14 RX ADMIN — IBUPROFEN 600 MG: 600 TABLET, FILM COATED ORAL at 12:05

## 2018-03-14 RX ADMIN — OXYCODONE HYDROCHLORIDE 5 MG: 5 TABLET ORAL at 12:05

## 2018-03-14 ASSESSMENT — PAIN SCALES - GENERAL
PAINLEVEL_OUTOF10: 7
PAINLEVEL_OUTOF10: 6
PAINLEVEL_OUTOF10: 5
PAINLEVEL_OUTOF10: 1
PAINLEVEL_OUTOF10: 0

## 2018-03-14 NOTE — PROGRESS NOTES
Staples removed, benzoine and steri strips applied to approximated incision.  Incisional instructions discussed with pt.

## 2018-03-14 NOTE — PROGRESS NOTES
Discharge appts, education and information discussed with pt.  She understands self and infants care.  Rxs given and signed narcotics form.

## 2018-03-14 NOTE — CONSULTS
MOB reports infant is latching well and often on breasts, but is reporting nipple soreness.  Nipples and breasts assessed and bruising and redness observed on left nipple and slight redness noted on right nipple.  Offered to assist MOB with latching infant onto breast, but MOB declined and states infant is not due to eat until 3 pm.  MOB is also in the process of being discharged.      Described to MOB what an appropriate latch should look and feel like.  Also, demonstrated to MOB on how to wedge breast to obtain a deeper latch.  MOB encouraged to watch latching instructional video, but states already watched one at the [facility] office prior to delivery.  MOB is seen at the [facility] office locate on [address].    Encouraged MOB to feed infant on demand per hunger cues and at least 8-12 times in a 24 hour period.  Also, instructed MOB not to let infant go more than 3 hours without a feed.    Discussed the normal course of breastfeeding during the first 24-48-72 hours following delivery.    Discussed signs of successful milk transfer.    Discussed benefits of applying EBM to sore/damaged nipples and breasts.  Lanolin cream also provided along with instructions on use.    MOB aware of outpatient lactation assistance available to her through [facility].  Encouraged MOB to follow up with [facility] regarding any latch concerns or problems.  MOB also instructed to call infant's pediatrician should she have any concerns with how infant is feeding.      Breastfeeding Plan:    1) MOB will continue to put infant to breast first.      MOB is aware that an appropriate feed will last a minimum of 10-15 minutes at both breasts combined.  Breast milk was easily expressed in large quantities during hand expression.    2) Feedings to be supplemented with formula until told otherwise by infant's pediatrician according to Chan Blake table.    Hand-out of Chan Blake table provided and instructions on use of table reviewed with MOB.  MOB  verbalized understanding of instructions and denies having any questions at this time.    MOB again verbalized understanding of all information provided to her and states all her questions were answered.    Encouraged to call for assistance as needed.

## 2018-03-14 NOTE — DISCHARGE INSTRUCTIONS
POSTPARTUM DISCHARGE INSTRUCTIONS FOR MOM    YOB: 2000   Age: 18 y.o.               Admit Date: 3/11/2018     Discharge Date: 3/14/2018  Attending Doctor:  Brandy Moreland M.D.                  Allergies:  Patient has no known allergies.        Discharge Plan:   Diet Plan: Discussed  Activity Level: Discussed  Confirmed Follow up Appointment: Patient to Call and Schedule Appointment  Medication Reconciliation Updated: Yes  Influenza Vaccine Indication: Not indicated: Previously immunized this influenza season and > 8 years of age    REASONS TO CALL YOUR OBSTETRICIAN:  1.   Persistent fever or shaking chills (Temperature higher than 100.4)  2.   Heavy bleeding (soaking more than 1 pad per hour); Passing clots  3.   Foul odor from vagina  4.   Mastitis (Breast infection; breast pain, chills, fever, redness)  5.   Urinary pain, burning or frequency  6.   Episiotomy infection  7.   Abdominal incision infection  8.   Severe depression longer than 24 hours    HAND WASHING  · Prior to handling the baby.  · Before breastfeeding or bottle feeding baby.  · After using the bathroom or changing the baby's diaper.    WOUND CARE  Ask your physician for additional care instructions.  In general:    ·  Incision:      · Keep clean and dry.    · Do NOT lift anything heavier than your baby for up to 6 weeks.    · There should not be any opening or pus.      VAGINAL CARE  · Nothing inside vagina for 6 weeks: no sexual intercourse, tampons or douching.  · Bleeding may continue for 2-4 weeks.  Amount may vary.    · Call your physician for heavy bleeding which means soaking more than 1 pad per hour    BIRTH CONTROL  · It is possible to become pregnant at any time after delivery and while breastfeeding.  · Plan to discuss a method of birth control with your physician at your follow up visit. visit.    DIET AND ELIMINATION  · Eating more fiber (bran cereal, fruits, and vegetables) and drinking plenty of fluids will  "help to avoid constipation.  · Urinary frequency after childbirth is normal.    POSTPARTUM BLUES  During the first few days after birth, you may experience a sense of the \"blues\" which may include impatience, irritability or even crying.  These feeling come and go quickly.  However, as many as 1 in 10 women experience emotional symptoms known as postpartum depression.    Postpartum depression:  May start as early as the second or third day after delivery or take several weeks or months to develop.  Symptoms of \"blues\" are present, but are more intense:  Crying spells; loss of appetite; feelings of hopelessness or loss of control; fear of touching the baby; over concern or no concern at all about the baby; little or no concern about your own appearance/caring for yourself; and/or inability to sleep or excessive sleeping.  Contact your physician if you are experiencing any of these symptoms.    Crisis Hotline:  · [place] Crisis Hotline:  7-518-EKVURBI  Or 1-220.680.7768  · [place] Crisis Hotline:  1-636.573.6712  Or 972-234-6941    PREVENTING SHAKEN BABY:  If you are angry or stressed, PUT THE BABY IN THE CRIB, step away, take some deep breaths, and wait until you are calm to care for the baby.  DO NOT SHAKE THE BABY.  You are not alone, call a supporter for help.    · Crisis Call Center 24/7 crisis line 218-403-6902 or 1-722.190.4672  · You can also text them, text \"ANSWER\" to 791217    QUIT SMOKING/TOBACCO USE:  I understand the use of any tobacco products increases my chance of suffering from future heart disease and could cause other illnesses which may shorten my life. Quitting the use of tobacco products is the single most important thing I can do to improve my health. For further information on smoking / tobacco cessation call a Toll Free Quit Line at 1-285.234.5104 (*[employer] [place]) or 1-392.139.4787 (American Lung Association) or you can access the web based program at www.lungusa.org.    · [place] " Tobacco Users Help Line:  (501) 258-8276       Toll Free: 1-160.772.3470  · Quit Tobacco Program [facility] (806)760-5069    DEPRESSION / SUICIDE RISK:  As you are discharged from this [facility], it is important to learn how to keep safe from harming yourself.    Recognize the warning signs:  · Abrupt changes in personality, positive or negative- including increase in energy   · Giving away possessions  · Change in eating patterns- significant weight changes-  positive or negative  · Change in sleeping patterns- unable to sleep or sleeping all the time   · Unwillingness or inability to communicate  · Depression  · Unusual sadness, discouragement and loneliness  · Talk of wanting to die  · Neglect of personal appearance   · Rebelliousness- reckless behavior  · Withdrawal from people/activities they love  · Confusion- inability to concentrate     If you or a loved one observes any of these behaviors or has concerns about self-harm, here's what you can do:  · Talk about it- your feelings and reasons for harming yourself  · Remove any means that you might use to hurt yourself (examples: pills, rope, extension cords, firearm)  · Get professional help from the community (Mental Health, Substance Abuse, psychological counseling)  · Do not be alone:Call your Safe Contact- someone whom you trust who will be there for you.  · Call your local CRISIS HOTLINE 438-4341 or 412-885-5775  · Call your local Children's Mobile Crisis Response Team [place] (088) 696-5409 or www.Fresvii  · Call the toll free National Suicide Prevention Hotlines   · National Suicide Prevention Lifeline 660-159-MNLQ (6316)  · National Hope Line Network 800-SUICIDE (579-4753)    DISCHARGE SURVEY:  Thank you for choosing [facility].  We hope we provided you with very good care.  You may be receiving a survey in the mail.  Please fill it out.  Your opinion is valuable to us.    ADDITIONAL EDUCATIONAL  MATERIALS GIVEN TO PATIENT:        My signature on this form indicates that:  1.  I have reviewed and understand the above information  2.  My questions regarding this information have been answered to my satisfaction.  3.  I have formulated a plan with my discharge nurse to obtain my prescribed medication for home.

## 2018-03-14 NOTE — DISCHARGE SUMMARY
Discharge Summary:      Joelle Morrissey      Admit Date:   3/11/2018  Discharge Date:  3/14/2018     Admitting diagnosis:  Pregnancy  40 WEEKS    delivery delivered  Labor and delivery, indication for care  Discharge Diagnosis: Status post  for repeat.  Pregnancy Complications: none  Tubal Ligation:  no        History:  Past Medical History:   Diagnosis Date   • Pregnant      OB History    Para Term  AB Living   3 1 1   1 1   SAB TAB Ectopic Molar Multiple Live Births   1         1      # Outcome Date GA Lbr Howard/2nd Weight Sex Delivery Anes PTL Lv   3 Current            2 SAB 16 18w0d   M SAB  Y FD      Birth Comments: baby passed on its own in [place] at home.    1 Term 03/17/15 40w3d  2.863 kg (6 lb 5 oz) M CS-LTranv EPI N OXANA      Complications: Fetal Intolerance      Birth Comments: baby's heart rate dropped           Patient has no known allergies.  Patient Active Problem List    Diagnosis Date Noted   • Postoperative pain 2018   • Supervision of high risk pregnancy in third trimester 2017   • Vaginal laceration 10/24/2017   • Not immune to rubella 10/18/2017   • History of C/S x 1 - desires TOLAC (consent signed 10/5/17), no BTL desired 2017        Hospital Course:   18 y.o. , now para 2, was admitted with the above mentioned diagnosis, underwent Repeat  without problems. Patient postpartum course was unremarkable, with progressive advancement in diet , ambulation and toleration of oral analgesia. Patient without complaints today and desires discharge.      Vitals:    18 0500 18 0800 18 2000 18 2100   BP:  102/63 118/78 113/73   Pulse: 95 (!) 102 (!) 110 97   Resp:    Temp:  36.8 °C (98.3 °F) 37.5 °C (99.5 °F) 36.9 °C (98.4 °F)   SpO2: 97% 92% 96% 97%   Weight:       Height:           Current Facility-Administered Medications   Medication Dose   • ibuprofen (MOTRIN) tablet 600 mg  600 mg   •  oxyCODONE immediate-release (ROXICODONE) tablet 5 mg  5 mg   • oxyCODONE immediate release (ROXICODONE) tablet 10 mg  10 mg   • morphine (pf) 4 mg/ml injection 4 mg  4 mg   • ondansetron (ZOFRAN) syringe/vial injection 4 mg  4 mg    Or   • ondansetron (ZOFRAN ODT) dispertab 4 mg  4 mg   • oxytocin (PITOCIN) infusion (for postpartum)   mL/hr   • oxytocin (PITOCIN) infusion (for postpartum)   mL/hr   • LR infusion     • PRN oxytocin (PITOCIN) (20 Units/1000 mL) PRN for excessive uterine bleeding - See Admin Instr  125-999 mL/hr   • miSOPROStol (CYTOTEC) tablet 600 mcg  600 mcg   • methylergonovine (METHERGINE) injection 0.2 mg  0.2 mg   • carboPROST (HEMABATE) injection 250 mcg  250 mcg   • docusate sodium (COLACE) capsule 100 mg  100 mg   • simethicone (MYLICON) chewable tab 80 mg  80 mg   • fentaNYL (SUBLIMAZE) injection 50 mcg  50 mcg       Exam:  Breast Exam: negative  Abdomen: Abdomen soft, non-tender. BS normal. No masses,  No organomegaly  Fundus Non Tender: yes  Incision: dry and intact  Perineum: perineum intact  Extremity: extremities, peripheral pulses and reflexes normal     Labs:  Recent Labs      03/11/18   0830  03/12/18   0342   WBC  12.2*  13.3*   RBC  5.02  3.92*   HEMOGLOBIN  13.4  10.4*   HEMATOCRIT  40.3  31.2*   MCV  80.3*  79.6*   MCH  26.7*  26.5*   MCHC  33.3*  33.3*   RDW  43.4  42.9   PLATELETCT  298  238   MPV  10.7  10.4        Activity:   Discharge to home  Pelvic Rest x 6 weeks    Assessment:  normal postpartum course  Discharge Assessment: No areas of skin breakdown/redness; surgical incision intact/healing     Follow up: .[facility] or [facility] in 5 weeks for vaginal ; 1 week for incision check.      Discharge Meds:   Current Outpatient Prescriptions   Medication Sig Dispense Refill   • docusate sodium 100 MG Cap Take 100 mg by mouth 2 times a day as needed for Constipation. 60 Cap 1   • oxyCODONE-acetaminophen (PERCOCET) 5-325 MG Tab Take 1 Tab by mouth every four  hours as needed for up to 5 days. 30 Tab 0   • ibuprofen (MOTRIN) 800 MG Tab Take 1 Tab by mouth every 8 hours as needed. 30 Tab 1       Fariha Krause M.D.

## 2018-03-21 ENCOUNTER — POST PARTUM (OUTPATIENT)
Dept: OBGYN | Facility: CLINIC | Age: 18
End: 2018-03-21
Payer: MEDICAID

## 2018-03-21 VITALS — SYSTOLIC BLOOD PRESSURE: 100 MMHG | WEIGHT: 218 LBS | BODY MASS INDEX: 39.87 KG/M2 | DIASTOLIC BLOOD PRESSURE: 46 MMHG

## 2018-03-21 DIAGNOSIS — Z51.89 VISIT FOR WOUND CHECK: ICD-10-CM

## 2018-03-21 PROCEDURE — 90050 PR POSTPARTUM VISIT: CPT | Performed by: OBSTETRICS & GYNECOLOGY

## 2018-03-21 NOTE — NON-PROVIDER
Pt here today for C/Section check  Operation date: 03/11/2018  WT: 218 lb  BP: 100/46  Pt states incision is healing well, states her pain has decreased.   Good # [phone]

## 2018-03-21 NOTE — PROGRESS NOTES
Joelle Morrissey Is a 18 y.o.  status post  delivery on 3/11/18. Patient is here today for an incision check. Currently the patient is without complaints.  She reports Normal  BM.  Normal  appetite without nausea and vomiting. She denies fever. Pain is under good control. Breast feeding.  Minimal lochia.    /46   Wt 98.9 kg (218 lb)   LMP 2017   Breastfeeding? Yes   BMI 39.87 kg/m²   ABD Abdomen soft, non-tender. BS normal. No masses or organomegaly  Incision healing normally, small area of granulation tissue at left margin measuring 2 cm.       Assessment and plan  Status post  delivery  No complications incision healing well  Followup in 4 weeks for final postpartum checkup

## 2018-04-12 ENCOUNTER — POST PARTUM (OUTPATIENT)
Dept: OBGYN | Facility: CLINIC | Age: 18
End: 2018-04-12
Payer: MEDICAID

## 2018-04-12 VITALS — SYSTOLIC BLOOD PRESSURE: 118 MMHG | WEIGHT: 220 LBS | DIASTOLIC BLOOD PRESSURE: 64 MMHG | BODY MASS INDEX: 40.24 KG/M2

## 2018-04-12 PROBLEM — Z78.9 NOT IMMUNE TO RUBELLA: Status: RESOLVED | Noted: 2017-10-18 | Resolved: 2018-04-12

## 2018-04-12 PROBLEM — S31.41XA VAGINAL LACERATION: Status: RESOLVED | Noted: 2017-10-24 | Resolved: 2018-04-12

## 2018-04-12 PROBLEM — O09.93 SUPERVISION OF HIGH RISK PREGNANCY IN THIRD TRIMESTER: Status: RESOLVED | Noted: 2017-11-30 | Resolved: 2018-04-12

## 2018-04-12 PROBLEM — Z98.891 HISTORY OF PRIMARY CESAREAN SECTION: Status: RESOLVED | Noted: 2017-09-08 | Resolved: 2018-04-12

## 2018-04-12 PROBLEM — G89.18 POSTOPERATIVE PAIN: Status: RESOLVED | Noted: 2018-03-14 | Resolved: 2018-04-12

## 2018-04-12 PROCEDURE — 90050 PR POSTPARTUM VISIT: CPT | Performed by: PHYSICIAN ASSISTANT

## 2018-04-12 RX ORDER — ACETAMINOPHEN AND CODEINE PHOSPHATE 120; 12 MG/5ML; MG/5ML
1 SOLUTION ORAL DAILY
Qty: 28 TAB | Refills: 11 | Status: SHIPPED
Start: 2018-04-12 | End: 2020-04-08

## 2018-04-12 NOTE — PROGRESS NOTES
Subjective:      Joelle Morrissey is a 18 y.o. female who presents with postpartum visit today. 4wk s/p repeat C/S at term without complications. Pt has no complaints- denies heavy vaginal bleeding, depression, intercourse, pain or problems with BF. PAP not done as pt less than 22yo. BCM desired is BCP - risks d/w pt, instructions given, rx written for Micronor. Pt to call if other BCM desired, d/w pt Nexplanon and IUDs.           HPI    ROS       Objective:     /64   Wt 99.8 kg (220 lb)   LMP 05/28/2017   Breastfeeding? Yes   BMI 40.24 kg/m²      Physical Exam   Constitutional: She appears well-developed and well-nourished.   HENT:   Head: Normocephalic and atraumatic.   Eyes: Pupils are equal, round, and reactive to light.   Neck: Normal range of motion. Neck supple. No thyromegaly present.   Cardiovascular: Normal rate, regular rhythm and normal heart sounds.    Pulmonary/Chest: Effort normal and breath sounds normal. No respiratory distress.   Abdominal: Soft. Bowel sounds are normal. She exhibits no distension. There is no tenderness.   C/S incision C/D/I with one area of mild redness, some clear d/c. No erythema, induration. Healing well.    Genitourinary: Vagina normal and uterus normal. Pelvic exam was performed with patient supine. There is no rash or tenderness on the right labia. There is no rash or tenderness on the left labia. Uterus is not deviated, not enlarged and not tender. Cervix exhibits no motion tenderness. Right adnexum displays no mass and no tenderness. Left adnexum displays no mass and no tenderness. No erythema in the vagina. No foreign body in the vagina. No signs of injury around the vagina. No vaginal discharge found.   Neurological: She is alert. She has normal reflexes.   Skin: Skin is warm and dry. No erythema.   Psychiatric: She has a normal mood and affect. Her behavior is normal. Thought content normal.   Vitals reviewed.              Assessment/Plan:     1.  Postpartum care following  delivery  - PAP at 18yo  - Work note given today  - norethindrone (MICRONOR) 0.35 MG tablet; Take 1 Tab by mouth every day.  Dispense: 28 Tab; Refill: 11

## 2018-04-12 NOTE — LETTER
April 12, 2018       Patient: Joelel Morrissey   YOB: 2000   Date of Visit: 4/12/2018         To Whom It May Concern:    It is my medical opinion that Joelle Morrissey may return to full duty, no restrictions as of May 1, 2018. Thank you.    If you have any questions or concerns, please don't hesitate to call [phone]          Sincerely,          KRISTY Scott.

## 2018-04-12 NOTE — NON-PROVIDER
Pt here today for postpartum exam.  Operation Date: 03/11/2018  Breast and formula feeding  BCM: condoms and pill, [facility].   LMP: not yet  WT: 220 lb  BP: 118/64  Pt states no complications today  Good ph: [phone]

## 2018-07-27 ENCOUNTER — APPOINTMENT (OUTPATIENT)
Dept: RADIOLOGY | Facility: MEDICAL CENTER | Age: 18
End: 2018-07-27
Attending: EMERGENCY MEDICINE
Payer: MEDICAID

## 2018-07-27 ENCOUNTER — HOSPITAL ENCOUNTER (EMERGENCY)
Facility: MEDICAL CENTER | Age: 18
End: 2018-07-27
Attending: EMERGENCY MEDICINE
Payer: MEDICAID

## 2018-07-27 VITALS
SYSTOLIC BLOOD PRESSURE: 116 MMHG | WEIGHT: 218.03 LBS | BODY MASS INDEX: 37.22 KG/M2 | TEMPERATURE: 98.4 F | HEIGHT: 64 IN | OXYGEN SATURATION: 98 % | HEART RATE: 76 BPM | DIASTOLIC BLOOD PRESSURE: 76 MMHG | RESPIRATION RATE: 18 BRPM

## 2018-07-27 DIAGNOSIS — S69.92XA WRIST INJURY, LEFT, INITIAL ENCOUNTER: ICD-10-CM

## 2018-07-27 PROCEDURE — 73110 X-RAY EXAM OF WRIST: CPT | Mod: LT

## 2018-07-27 PROCEDURE — 99284 EMERGENCY DEPT VISIT MOD MDM: CPT

## 2018-07-27 RX ORDER — NAPROXEN SODIUM 550 MG/1
550 TABLET ORAL 2 TIMES DAILY WITH MEALS
Qty: 30 TAB | Refills: 0 | Status: SHIPPED | OUTPATIENT
Start: 2018-07-27 | End: 2020-04-08

## 2018-07-27 NOTE — ED NOTES
Pt received discharge, follow up and return to ED instructions; pt verbalized understanding. Pt denied any needs/pain. Pt ambulated with a steady gait. Pt received (1) paper rx at discharge as well as a work note thru 07/30/18.

## 2018-07-27 NOTE — ED TRIAGE NOTES
"Chief Complaint   Patient presents with   • Wrist Injury     pt reports that wednesday the trunk closed on her left wrist. reports pain/swelling.     Swelling noted. CMS +  Blood pressure 124/60, pulse 66, temperature 36.5 °C (97.7 °F), resp. rate 17, height 1.626 m (5' 4\"), weight 98.9 kg (218 lb 0.6 oz), SpO2 99 %, currently breastfeeding.    Pt informed of wait times. Educated on triage process.  Asked to return to triage RN for any new or worsening of symptoms. Thanked for patience.        "

## 2018-07-28 NOTE — ED PROVIDER NOTES
ED Provider Note    CHIEF COMPLAINT   Chief Complaint   Patient presents with   • Wrist Injury     pt reports that wednesday the trunk closed on her left wrist. reports pain/swelling.       HPI   Joelle Morrissey is a 18 y.o. female who presents to the emergency room today with complaints of left wrist injury.  Patient states that on Wednesday the trunk of her car accidentally bounced back down again hit her left wrist between the car and the trunk causing injury.  Since that time she has had sharp pain to the area described as pinching/sharp.  No numbness or tingling the pain is worse with movement or palpation no other deformity.  Patient states she is unable to work because of the pain.    REVIEW OF SYSTEMS   See HPI for further details. All other systems are negative.     PAST MEDICAL HISTORY   Past Medical History:   Diagnosis Date   • Pregnant        FAMILY HISTORY  Family History   Problem Relation Age of Onset   • Arthritis Mother    • Hyperlipidemia Mother    • Diabetes Neg Hx        SOCIAL HISTORY  Social History     Social History   • Marital status:      Spouse name: N/A   • Number of children: N/A   • Years of education: N/A     Social History Main Topics   • Smoking status: Never Smoker   • Smokeless tobacco: Never Used   • Alcohol use No   • Drug use: No   • Sexual activity: Yes     Partners: Male      Comment: Planned pregnancy     Other Topics Concern   • Not on file     Social History Narrative   • No narrative on file       SURGICAL HISTORY  Past Surgical History:   Procedure Laterality Date   • REPEAT C SECTION  3/11/2018    Procedure: REPEAT C SECTION;  Surgeon: Brandy Moreland M.D.;  Location: LABOR AND DELIVERY;  Service: Labor and Delivery   • PRIMARY C SECTION  3/17/2015    Performed by Blue Andino M.D. at LABOR AND DELIVERY       CURRENT MEDICATIONS   Home Medications    **Home medications have not yet been reviewed for this encounter**         ALLERGIES   No Known  "Allergies    PHYSICAL EXAM  VITAL SIGNS: /76   Pulse 76   Temp 36.9 °C (98.4 °F)   Resp 18   Ht 1.626 m (5' 4\")   Wt 98.9 kg (218 lb 0.6 oz)   SpO2 98%   BMI 37.43 kg/m²  Room air O2: 98    Constitutional: Well developed, Well nourished,  acute distress, Non-toxic appearance.   Cardiovascular: Normal heart rate, Normal rhythm, No murmurs, No rubs, No gallops.   Thorax & Lungs: Normal breath sounds, No respiratory distress, No wheezing, No chest tenderness.   Abdomen: Bowel sounds normal, Soft, No tenderness, No masses, No pulsatile masses.   Skin: Warm, Dry, No erythema, No rash.   Extremities: Intact distal pulses, No cyanosis, No clubbing.   Musculoskeletal: Tenderness over the lateral portion of her left wrist there is some edema to the area minimal purpura no other deformity pulses intact distally sensation intact distally range of motion is limited to flexion extension secondary to the pain and does seem to affect the tendon/flexor.  Neurologic: Alert & oriented x 3, Normal motor function, Normal sensory function, No focal deficits noted.     RADIOLOGY/PROCEDURES  DX-WRIST-COMPLETE 3+ LEFT   Final Result      No radiographic evidence of acute traumatic injury.            COURSE & MEDICAL DECISION MAKING  Pertinent Labs & Imaging studies reviewed. (See chart for details)  Patient placed in a Velcro wrist splint advised ice and elevation rest x-ray showed no acute process such as fracture.  Patient placed on Anaprox for the pain will follow up with primary care physician return to persistent or worsening symptoms no further workup was provided to patient she verbalizes understanding instructions as above.    FINAL IMPRESSION  1.  Acute contusion left wrist  2.   3.      Electronically signed by: Ronald Estrada, 7/27/2018 5:41 PM    "

## 2019-06-01 ENCOUNTER — HOSPITAL ENCOUNTER (EMERGENCY)
Facility: MEDICAL CENTER | Age: 19
End: 2019-06-02
Attending: EMERGENCY MEDICINE
Payer: COMMERCIAL

## 2019-06-01 ENCOUNTER — APPOINTMENT (OUTPATIENT)
Dept: RADIOLOGY | Facility: MEDICAL CENTER | Age: 19
End: 2019-06-01
Attending: EMERGENCY MEDICINE
Payer: COMMERCIAL

## 2019-06-01 DIAGNOSIS — R07.89 CHEST WALL PAIN: ICD-10-CM

## 2019-06-01 LAB — EKG IMPRESSION: NORMAL

## 2019-06-01 PROCEDURE — 71046 X-RAY EXAM CHEST 2 VIEWS: CPT

## 2019-06-01 PROCEDURE — 93005 ELECTROCARDIOGRAM TRACING: CPT

## 2019-06-01 PROCEDURE — 700102 HCHG RX REV CODE 250 W/ 637 OVERRIDE(OP): Performed by: EMERGENCY MEDICINE

## 2019-06-01 PROCEDURE — 700101 HCHG RX REV CODE 250: Performed by: EMERGENCY MEDICINE

## 2019-06-01 PROCEDURE — 93005 ELECTROCARDIOGRAM TRACING: CPT | Performed by: EMERGENCY MEDICINE

## 2019-06-01 PROCEDURE — A9270 NON-COVERED ITEM OR SERVICE: HCPCS | Performed by: EMERGENCY MEDICINE

## 2019-06-01 PROCEDURE — 99284 EMERGENCY DEPT VISIT MOD MDM: CPT

## 2019-06-01 RX ORDER — ACETAMINOPHEN 500 MG
1000 TABLET ORAL ONCE
Status: COMPLETED | OUTPATIENT
Start: 2019-06-01 | End: 2019-06-01

## 2019-06-01 RX ORDER — IBUPROFEN 600 MG/1
600 TABLET ORAL ONCE
Status: COMPLETED | OUTPATIENT
Start: 2019-06-01 | End: 2019-06-01

## 2019-06-01 RX ORDER — LIDOCAINE 50 MG/G
1 PATCH TOPICAL EVERY 24 HOURS
Qty: 30 PATCH | Refills: 0 | Status: SHIPPED
Start: 2019-06-01 | End: 2020-04-08

## 2019-06-01 RX ORDER — LIDOCAINE 50 MG/G
1 PATCH TOPICAL ONCE
Status: DISCONTINUED | OUTPATIENT
Start: 2019-06-01 | End: 2019-06-02 | Stop reason: HOSPADM

## 2019-06-01 RX ADMIN — IBUPROFEN 600 MG: 600 TABLET ORAL at 23:14

## 2019-06-01 RX ADMIN — LIDOCAINE 1 PATCH: 50 PATCH TOPICAL at 23:31

## 2019-06-01 RX ADMIN — ACETAMINOPHEN 1000 MG: 500 TABLET ORAL at 23:14

## 2019-06-01 ASSESSMENT — PAIN DESCRIPTION - DESCRIPTORS: DESCRIPTORS: SHARP

## 2019-06-01 ASSESSMENT — LIFESTYLE VARIABLES: DO YOU DRINK ALCOHOL: NO

## 2019-06-02 VITALS
SYSTOLIC BLOOD PRESSURE: 118 MMHG | DIASTOLIC BLOOD PRESSURE: 88 MMHG | HEIGHT: 64 IN | HEART RATE: 86 BPM | OXYGEN SATURATION: 99 % | RESPIRATION RATE: 20 BRPM | TEMPERATURE: 98.1 F | WEIGHT: 231.7 LBS | BODY MASS INDEX: 39.56 KG/M2

## 2019-06-02 NOTE — ED TRIAGE NOTES
"Chief Complaint   Patient presents with   • Chest Pain   • Back Pain     Pt reports two days of the above pain, chest pain located under left breast and radiates to left shoulder blade. Pt denies SOB, N/V. Pain is described as sharp and stabbing. Pt appears to be uncomfortable in triage and reports \"it feels better to stand for my pain.\" Pt educated on triage process and placed back in lobby, pt encourage to alert staff with changes in condition.        /70   Pulse (!) 117   Temp 36.7 °C (98.1 °F) (Temporal)   Resp 20   Ht 1.626 m (5' 4\")   Wt 105.1 kg (231 lb 11.3 oz)   LMP 05/31/2019   SpO2 99%   BMI 39.77 kg/m²    "

## 2019-06-02 NOTE — DISCHARGE INSTRUCTIONS
You were seen in the emergency department for chest pain and pain in the back.  Your chest x-ray, EKG and exam are reassuring.  This is most likely muscular in nature.  You are being sent home with a prescription for medications to use as needed.    For pain you can take ibuprofen (Motrin), 600mg every 6 hours as needed for pain (take with food to avoid GI upset). You can also take acetaminophen (Tylenol), 1000mg every 8 hours as needed for pain. Do not take more than 3000mg of acetaminophen in any 24 hour period.  Taking these medications regularly during the day can be very effective in controlling pain.    You are being sent home with a prescription for a lidocaine patch.  This should be worn at the area of worst pain for 12 hours, then removed for 12 hours.  If the prescription costs are too high, please discussed with the pharmacist about over-the-counter strength formulations that may be more economical for you.    Please return to the emergency department or seek medical attention if you develop:  Difficulty breathing, severe cough, fevers, any other new or concerning findings

## 2019-06-02 NOTE — ED PROVIDER NOTES
ED Provider Note    Scribed for Nayan Echeverria M.D. by Hortencia Gomez. 6/1/2019,  10:51 PM.    Means of Arrival: Walk in  History obtained from: Patient  History limited by: None    CHIEF COMPLAINT  Chief Complaint   Patient presents with   • Chest Pain   • Back Pain       HPI  Joelle Morrissey is a 19 y.o. female who presents to the Emergency Department complaining of mid back pain onset yesterday. She states the pain radiates to her chest wall and is exacerbated by deep respiration.  She took ibuprofen for the pain with no alleviation. The patient denies recent accidents, fever, cough, dysuria. She has no major medical problems to report and no known medication allergies. She also states there is no chance of pregnancy as she is on her menstrual cycle currrently.         REVIEW OF SYSTEMS  CONSTITUTIONAL:  No fever or recent falls.  CARDIOVASCULAR:  Positive for chest wall pain  RESPIRATORY:  No cough.  GENITOURINARY: No dysuria.  MUSCULOSKELETAL: Positive for back pain  See HPI for further details.     PAST MEDICAL HISTORY  Past Medical History:   Diagnosis Date   • Pregnant    • PTSD (post-traumatic stress disorder)        FAMILY HISTORY  Family History   Problem Relation Age of Onset   • Arthritis Mother    • Hyperlipidemia Mother    • Diabetes Neg Hx        SOCIAL HISTORY   reports that she has never smoked. She has never used smokeless tobacco. She reports that she does not drink alcohol or use drugs.    SURGICAL HISTORY  Past Surgical History:   Procedure Laterality Date   • REPEAT C SECTION  3/11/2018    Procedure: REPEAT C SECTION;  Surgeon: Brandy Moreland M.D.;  Location: LABOR AND DELIVERY;  Service: Labor and Delivery   • PRIMARY C SECTION  3/17/2015    Performed by Blue Andino M.D. at LABOR AND DELIVERY       CURRENT MEDICATIONS  Home Medications    **Home medications have not yet been reviewed for this encounter**         ALLERGIES  No Known Allergies      PHYSICAL EXAM  VITAL SIGNS:  "/70   Pulse (!) 117   Temp 36.7 °C (98.1 °F) (Temporal)   Resp 20   Ht 1.626 m (5' 4\")   Wt 105.1 kg (231 lb 11.3 oz)   LMP 05/31/2019   SpO2 99%   BMI 39.77 kg/m²    Gen: alert, no acute distress  HENT: ATNC  Eyes: normal conjuctiva  Resp: No resipiratory distress.   CV: Left anterior and lateral chest wall tenderness to palpation.No JVD   Abd: Soft non-tender. No CVA tenderness. Non-distended  Back: Left paraspinal muscle tenderness.  Extremities:No calf tenderness.  No deformity       DIAGNOSTIC STUDIES / PROCEDURES     RADIOLOGY  DX-CHEST-2 VIEWS   Final Result      Normal chest.               INTERPRETING LOCATION: [address] [place] [address], [facility]        The radiologist’s interpretation of all radiology studies have been reviewed by me.    COURSE & MEDICAL DECISION MAKING  Pertinent Labs & Imaging studies reviewed. (See chart for details)    10:51 PM Patient seen and examined at bedside. Ordered forimaging to evaluate. Patient will be treated with Motrin 600 mg, Tylenol, and Lidoderm 5% Patch for her symptoms.     12:37 AM - Re-examined; The patient is resting in bed. I discussed her above findings were overall unremarkable and plans for discharge with a prescription for Lidoderm patch. The patient was advised to follow up with her PCP and instructed to return to the ED if her symptoms worsen. Patient understands and agrees.     Medical Decision Making:  Patient presents with chest wall and back pain worse with deep breaths.  Patient has very reproducible symptoms with light palpation of the anterior, lateral, posterior chest wall left side.  There is no crepitance.  Will obtain chest x-ray to evaluate for occult fracture/pyelonephritis, primary contusion.  No evidence of ACS given muscular skeletal nature of the patient's symptoms.  No evidence or risk factors to suggest pulmonary embolism.  Patient will be provided with symptom medic treatment.    Chest x-ray negative.  Patient will be discharged " home with pain medications, was given anticipatory guidance.     The patient will return for new or worsening symptoms and is stable at the time of discharge.      DISPOSITION:  Patient will be discharged home in stable condition.    FOLLOW UP:  Jackie Judd D.O.  [address]  [address]  [facility] [address]  [phone]    In 3 days  As needed      OUTPATIENT MEDICATIONS:  Discharge Medication List as of 6/2/2019 12:13 AM      START taking these medications    Details   lidocaine (LIDODERM) 5 % Patch Apply 1 Patch to skin as directed every 24 hours., Disp-30 Patch, R-0, Normal               FINAL IMPRESSION  1. Chest wall pain            Hortencia AN (Scribe), am scribing for, and in the presence of, Nayan Echeverria M.D..    Electronically signed by: Hortencia Gomez (Scribe), 6/1/2019    INayan M.D. personally performed the services described in this documentation, as scribed by Hortencia Gomez in my presence, and it is both accurate and complete.  E  The note accurately reflects work and decisions made by me.  Nayan Echeverria  6/2/2019  3:07 AM

## 2020-03-12 ENCOUNTER — INITIAL PRENATAL (OUTPATIENT)
Dept: OBGYN | Facility: CLINIC | Age: 20
End: 2020-03-12

## 2020-03-12 VITALS
DIASTOLIC BLOOD PRESSURE: 58 MMHG | BODY MASS INDEX: 41.99 KG/M2 | SYSTOLIC BLOOD PRESSURE: 122 MMHG | HEIGHT: 63 IN | WEIGHT: 237 LBS

## 2020-03-12 DIAGNOSIS — O34.219 HISTORY OF CESAREAN DELIVERY, CURRENTLY PREGNANT: ICD-10-CM

## 2020-03-12 DIAGNOSIS — N93.8 DUB (DYSFUNCTIONAL UTERINE BLEEDING): Primary | ICD-10-CM

## 2020-03-12 PROCEDURE — 99395 PREV VISIT EST AGE 18-39: CPT | Performed by: NURSE PRACTITIONER

## 2020-03-12 ASSESSMENT — ENCOUNTER SYMPTOMS
EYES NEGATIVE: 1
GASTROINTESTINAL NEGATIVE: 1
PSYCHIATRIC NEGATIVE: 1
CONSTITUTIONAL NEGATIVE: 1
NEUROLOGICAL NEGATIVE: 1
RESPIRATORY NEGATIVE: 1
CARDIOVASCULAR NEGATIVE: 1
MUSCULOSKELETAL NEGATIVE: 1

## 2020-03-12 NOTE — PROGRESS NOTES
Joelle Goldberg is a 20 y.o. y.o. female who presents for dysfunctional uterine bleeding        HPI Comments: Pt presents for dysfunctional uterine bleeding. Pt has no complaints at this time Had some morning sickness but resolving now. LMP: 11/15/2019.  Regular menses every 28-30 days so sure of LMP.    .  Review of Systems   Pertinent positives documented in HPI and all other systems reviewed & are negative  Review of Systems   Constitutional: Negative.    HENT: Negative.    Eyes: Negative.    Respiratory: Negative.    Cardiovascular: Negative.    Gastrointestinal: Negative.    Genitourinary: Negative.    Musculoskeletal: Negative.    Skin:        Small bruises on her lower legs for a couple of weeks. Not painful, still working.  Had the same thing with her miscarriage.     Neurological: Negative.    Endo/Heme/Allergies: Negative.    Psychiatric/Behavioral: Negative.    All other systems reviewed and are negative.      All PMH, PSH, allergies, social history and FH reviewed and updated today:  Past Medical History:   Diagnosis Date   • Pregnant    • PTSD (post-traumatic stress disorder)      Past Surgical History:   Procedure Laterality Date   • REPEAT C SECTION  3/11/2018    Procedure: REPEAT C SECTION;  Surgeon: Brandy Moreland M.D.;  Location: LABOR AND DELIVERY;  Service: Labor and Delivery   • PRIMARY C SECTION  3/17/2015    Performed by Blue Andino M.D. at LABOR AND DELIVERY     Patient has no known allergies.  Social History     Socioeconomic History   • Marital status:      Spouse name: Not on file   • Number of children: Not on file   • Years of education: Not on file   • Highest education level: Not on file   Occupational History   • Not on file   Social Needs   • Financial resource strain: Not on file   • Food insecurity     Worry: Not on file     Inability: Not on file   • Transportation needs     Medical: Not on file     Non-medical: Not on file   Tobacco Use   • Smoking status:  Never Smoker   • Smokeless tobacco: Never Used   Substance and Sexual Activity   • Alcohol use: No   • Drug use: No   • Sexual activity: Yes     Partners: Male     Comment: Planned pregnancy   Lifestyle   • Physical activity     Days per week: Not on file     Minutes per session: Not on file   • Stress: Not on file   Relationships   • Social connections     Talks on phone: Not on file     Gets together: Not on file     Attends [employer] service: Not on file     Active member of club or organization: Not on file     Attends meetings of clubs or organizations: Not on file     Relationship status: Not on file   • Intimate partner violence     Fear of current or ex partner: Not on file     Emotionally abused: Not on file     Physically abused: Not on file     Forced sexual activity: Not on file   Other Topics Concern   • Behavioral problems Not Asked   • Interpersonal relationships Not Asked   • Sad or not enjoying activities Not Asked   • Suicidal thoughts Not Asked   • Poor school performance Not Asked   • Reading difficulties Not Asked   • Speech difficulties Not Asked   • Writing difficulties Not Asked   • Inadequate sleep Not Asked   • Excessive TV viewing Not Asked   • Excessive video game use Not Asked   • Inadequate exercise Not Asked   • Sports related Not Asked   • Poor diet Not Asked   • Family concerns for drug/alcohol abuse Not Asked   • Poor oral hygiene Not Asked   • Bike safety Not Asked   • Family concerns vehicle safety Not Asked   Social History Narrative   • Not on file     Family History   Problem Relation Age of Onset   • Arthritis Mother    • Hyperlipidemia Mother    • Diabetes Neg Hx      Medications:   Current Outpatient Medications Ordered in Epic   Medication Sig Dispense Refill   • lidocaine (LIDODERM) 5 % Patch Apply 1 Patch to skin as directed every 24 hours. 30 Patch 0   • naproxen sodium (ANAPROX) 550 MG tablet Take 1 Tab by mouth 2 times a day, with meals. 30 Tab 0   • norethindrone  (MICRONOR) 0.35 MG tablet Take 1 Tab by mouth every day. 28 Tab 11   • docusate sodium 100 MG Cap Take 100 mg by mouth 2 times a day as needed for Constipation. 60 Cap 1   • ibuprofen (MOTRIN) 800 MG Tab Take 1 Tab by mouth every 8 hours as needed. 30 Tab 1   • Prenatal MV-Min-Fe Fum-FA-DHA (PRENATAL 1 PO) Take  by mouth.       No current Epic-ordered facility-administered medications on file.           Objective:   Vital measurements:  There were no vitals taken for this visit.  There is no height or weight on file to calculate BMI. (Goal BM I>18 <25)    Physical Exam   Nursing note and vitals reviewed.  Constitutional: She is oriented to person, place, and time. She appears well-developed and well-nourished. No distress.     HEENT:   Head: Normocephalic and atraumatic.   Right Ear: External ear normal.   Left Ear: External ear normal.   Nose: Nose normal.   Eyes: Conjunctivae and EOM are normal. Pupils are equal, round, and reactive to light. No scleral icterus.     Neck: Normal range of motion. Neck supple. No tracheal deviation present. No thyromegaly present.     Pulmonary/Chest: Effort normal and breath sounds normal. No respiratory distress. She has no wheezes. She has no rales. She exhibits no tenderness.     Cardiovascular: Regular, rate and rhythm. No JVD.    Abdominal: Soft. Bowel sounds are normal. She exhibits no distension and no mass. No tenderness. She has no rebound and no guarding.     Genitourinary:  No CVA tenderness     Musculoskeletal: Normal range of motion. She exhibits no edema and no tenderness.     Lymphadenopathy: She has no cervical adenopathy.     Neurological: She is alert and oriented to person, place, and time. She exhibits normal muscle tone.     Skin: Skin is warm and dry. No rash noted. She is not diaphoretic. Small circular 1-2 cm erythematous firm nodule spots on bilateral lower legs, with left >right. .     Psychiatric: She has a normal mood and affect. Her behavior is normal.  Judgment and thought content normal.               Assessment:     Dysfunctional uterine bleeding  Positive pregnancy test  Erythema nodosum    Plan:   Needs to meet with financial  SAB precautions  Continue PNV  Given US req so can schedule for 20 weeks.  RTC for new ob 2 weeks.

## 2020-03-12 NOTE — PROGRESS NOTES
"DUB/Pregnanmcy test  LMP: 11/15/2019  Positive UPT today, done in clinic  WT: 237 lb  BP: 122/58  Pt states her nausea & vomiting is getting a little better. Pt also reports having \"bumps\" on both of her legs. States no other complaints.    Good # [phone]  "

## 2020-03-20 ENCOUNTER — HOSPITAL ENCOUNTER (OUTPATIENT)
Facility: MEDICAL CENTER | Age: 20
End: 2020-03-20
Attending: OBSTETRICS & GYNECOLOGY | Admitting: OBSTETRICS & GYNECOLOGY
Payer: MEDICAID

## 2020-03-20 ENCOUNTER — APPOINTMENT (OUTPATIENT)
Dept: OBGYN | Facility: CLINIC | Age: 20
End: 2020-03-20

## 2020-04-01 ENCOUNTER — APPOINTMENT (OUTPATIENT)
Dept: RADIOLOGY | Facility: IMAGING CENTER | Age: 20
End: 2020-04-01
Attending: NURSE PRACTITIONER
Payer: MEDICAID

## 2020-04-01 PROCEDURE — 76805 OB US >/= 14 WKS SNGL FETUS: CPT | Mod: TC | Performed by: OBSTETRICS & GYNECOLOGY

## 2020-04-08 ENCOUNTER — INITIAL PRENATAL (OUTPATIENT)
Dept: OBGYN | Facility: CLINIC | Age: 20
End: 2020-04-08
Payer: MEDICAID

## 2020-04-08 VITALS
WEIGHT: 238.2 LBS | HEIGHT: 63 IN | SYSTOLIC BLOOD PRESSURE: 104 MMHG | BODY MASS INDEX: 42.21 KG/M2 | DIASTOLIC BLOOD PRESSURE: 52 MMHG

## 2020-04-08 DIAGNOSIS — Z34.82 ENCOUNTER FOR SUPERVISION OF OTHER NORMAL PREGNANCY IN SECOND TRIMESTER: ICD-10-CM

## 2020-04-08 PROCEDURE — 90040 PR PRENATAL FOLLOW UP: CPT | Performed by: ADVANCED PRACTICE MIDWIFE

## 2020-04-08 NOTE — LETTER
Cystic Fibrosis Carrier Testing  Joelle Goldberg    The following information is about a blood test that can be done to determine if you and/or your partner carry the gene for cystic fibrosis.    WHAT IS CYSTIC FIBROSIS?  · Cystic fibrosis (CF) is an inherited disease that affects more than 25,000 American children and young adults.  · Symptoms of CF vary but include lung congestion, pneumonia, diarrhea and poor growth.  Most people with CF have severe medical problems and some die at a young age.  Others have so few symptoms they are unaware they have CF.  · CF does not affect intelligence.  · Although there is no cure for CF at this time, scientists are making progress in improving treatment and in searching for a cure.  In the past many people with CF  at a very young age.  Today, many are living into their 20’s and 30’s.    IS THERE A CHANCE MY BABY COULD HAVE CYSTIC FIBROSIS?  · You can have a child with CF even if there is no history in your family (see chart below).  · CF testing can help determine if you are a carrier and at risk to have a child with CF.  Note: if both parents are carriers, there is a 1 in 4 (25%) chance with each pregnancy that they will have a child with CF.  · Carriers have one normal CF gene and one altered CF gene.  · People with CF have two altered CF genes.  · Most people have two normal copies of the CF gene.    Approximate risk that a couple with no family history of cystic fibrosis will have a child with cystic fibrosis:    Ethnic background / Risk     couple:  1 in 2,500   couple:  1 in 15,000            couple:  1 in 8,000     American couple:  1 in 32,000     WHAT TESTING IS AVAILABLE?  · There is a blood test that can be done to find out if you or your partner is a carrier.  · It is important to understand that CF carrier testing does not detect all CF carriers.  · If the test shows that you are both CF carriers, you unborn  baby can be tested to find out if the baby has CF.    HOW MUCH DOES IT COST TO HAVE CYSTIC FIBROSIS CARRIER TESTING?  · Cost and insurance coverage for CF carrier testing vary depending upon the laboratory used and your insurance policy.  · The average cost for CF carrier testing is $300 per person.  · Your genetic counselor can provide you with more information about cystic fibrosis carrier testing.    _____  Yes, I am interested in discussing carrier testing with a genetic counselor.    _____  No, I am not interested in CF carrier testing or in receiving more information about CF carrier testing.      Client signature: ________________________________________  4/8/2020

## 2020-04-08 NOTE — PROGRESS NOTES
NOB today /US 04/01/2020  LMP: 11/15/2019  Last pap:  Today  Phone # [phone]  Pharmacy confirmed  No problems today   Some fetal movement

## 2020-04-08 NOTE — PROGRESS NOTES
"  SUBJECTIVE:  Pt is a 20 y.o.   at 20w6d  gestation. Presents today for follow-up prenatal care. Has not been seen in ER or L & D since last visit. Reports good  fetal movement. Denies leaking of fluid dysuria, headaches, or N/V at this time.  Patient does not report cramping/contractions. Generally feels well today. Had anatomy US. Needs labs and pap today.     OBJECTIVE:   /52   Ht 1.6 m (5' 3\")   Wt 108 kg (238 lb 3.2 oz)   LMP 11/15/2019 (Exact Date)   BMI 42.20 kg/m²   Patients' weight gain, fluid intake and exercise level discussed.  Vitals, fundal height , fetal position, and FHR reviewed on flowsheet    Lab:No results found for this or any previous visit (from the past 336 hour(s)).    ASSESSMENT/ PLAN:   - IUP at 20w6d    - S = D   -   Patient Active Problem List    Diagnosis Date Noted   • History of  delivery-desires BTL 2017   • Erythema nodosum 2016       Reviewed anatomy US with patient.     We discussed her plan for BTL. She is comfortable with this regardless of gender.     Today we did see likely male fetus.     Pap explained and collected. Complete AFP ASAP.     - S/sx pregnancy and labor warning signs vs general discomforts discussed  - Fetal movements and kick counts reviewed. Adequate hydration reinforced  - Anticipatory guidance provided.   - RTC in 4 weeks for routine prenatal care.    "

## 2020-04-17 DIAGNOSIS — Z34.82 ENCOUNTER FOR SUPERVISION OF OTHER NORMAL PREGNANCY IN SECOND TRIMESTER: ICD-10-CM

## 2020-04-20 ENCOUNTER — TELEPHONE (OUTPATIENT)
Dept: OBGYN | Facility: CLINIC | Age: 20
End: 2020-04-20

## 2020-04-20 NOTE — TELEPHONE ENCOUNTER
----- Message from LINDEN Harp sent at 4/17/2020  8:26 AM PDT -----  Call pt and see if she has yeast symptoms.  4/20/20@8:37am N/a, msg left for patient to call back.

## 2020-04-20 NOTE — TELEPHONE ENCOUNTER
Pt called back, Pt is feeling some itchiness but no foul odor or abnormal discharge. I consulted with provider Kavya and explained to pt to eat more yogurt and decrease your sugar intake. Also explained that pt may take Monistat 7 day but to avoid the 1 day cream. Pt understood and no further questions.     ----- Message from LINDEN Harp sent at 4/17/2020  8:26 AM PDT -----  Call pt and see if she has yeast symptoms.

## 2020-05-07 ENCOUNTER — APPOINTMENT (OUTPATIENT)
Dept: LAB | Facility: MEDICAL CENTER | Age: 20
End: 2020-05-07
Attending: ADVANCED PRACTICE MIDWIFE
Payer: MEDICAID

## 2020-05-07 ENCOUNTER — ROUTINE PRENATAL (OUTPATIENT)
Dept: OBGYN | Facility: CLINIC | Age: 20
End: 2020-05-07
Payer: MEDICAID

## 2020-05-07 VITALS — SYSTOLIC BLOOD PRESSURE: 118 MMHG | DIASTOLIC BLOOD PRESSURE: 62 MMHG | BODY MASS INDEX: 43.05 KG/M2 | WEIGHT: 243 LBS

## 2020-05-07 DIAGNOSIS — Z34.82 ENCOUNTER FOR SUPERVISION OF OTHER NORMAL PREGNANCY IN SECOND TRIMESTER: ICD-10-CM

## 2020-05-07 PROCEDURE — 90040 PR PRENATAL FOLLOW UP: CPT | Performed by: ADVANCED PRACTICE MIDWIFE

## 2020-05-07 NOTE — PROGRESS NOTES
Pt here today for OB follow up  Pt states no complaints done  Reports +FM  Good # [phone]  Pharmacy Confirmed.  Chaperone offered and declined.

## 2020-05-07 NOTE — PROGRESS NOTES
SUBJECTIVE:  Pt is a 20 y.o.   at 25w0d  gestation. Presents today for follow-up prenatal care. Has not been seen in ER or L & D since last visit. Reports good  fetal movement. Denies leaking of fluid dysuria, headaches, or N/V at this time.  Patient does not report cramping/contractions. Generally feels well today. Had anatomy US. Was planning BTL with c section.     OBJECTIVE:   LMP 11/15/2019 (Exact Date)   Patients' weight gain, fluid intake and exercise level discussed.  Vitals, fundal height , fetal position, and FHR reviewed on flowsheet    Lab:No results found for this or any previous visit (from the past 336 hour(s)).    ASSESSMENT/ PLAN:   - IUP at 25w0d    - S = D   -   Patient Active Problem List    Diagnosis Date Noted   • History of  delivery-desires BTL 2017       PP BCM: Desires BTL. I discussed with patient that at this time I do not recommend tubal ligation related to her age. Her partner is supportive of this decision. I discussed that she has about 15 additional years of childbearing years if not more. I am not sure that a permanent decision would be appropriate at this time.     We discussed mood with patient and she believes she is doing well. Her acne is likely related to sun exposure, stress, and pregnancy hormones. I have encouraged her to use some type of sunscreen.       - S/sx pregnancy and labor warning signs vs general discomforts discussed  - Fetal movements and kick counts reviewed. Adequate hydration reinforced  - Anticipatory guidance provided.   - RTC in 3 weeks for routine prenatal care.

## 2020-05-13 ENCOUNTER — TELEPHONE (OUTPATIENT)
Dept: OBGYN | Facility: CLINIC | Age: 20
End: 2020-05-13

## 2020-05-13 NOTE — TELEPHONE ENCOUNTER
[facility] called to inform us that the patient was already too far along in her pregnancy to draw that AFP test. I confirmed the SAI with  and patient was still to far along. No further questions.

## 2020-05-15 DIAGNOSIS — Z34.82 ENCOUNTER FOR SUPERVISION OF OTHER NORMAL PREGNANCY IN SECOND TRIMESTER: ICD-10-CM

## 2020-05-15 PROBLEM — O09.899 RUBELLA NON-IMMUNE STATUS, ANTEPARTUM: Status: ACTIVE | Noted: 2020-05-15

## 2020-05-15 PROBLEM — Z28.39 RUBELLA NON-IMMUNE STATUS, ANTEPARTUM: Status: ACTIVE | Noted: 2020-05-15

## 2020-05-18 ENCOUNTER — TELEPHONE (OUTPATIENT)
Dept: OBGYN | Facility: CLINIC | Age: 20
End: 2020-05-18

## 2020-05-18 NOTE — TELEPHONE ENCOUNTER
Patient notified.  ----- Message from LINDEN Harp sent at 5/15/2020  2:40 PM PDT -----  Let pt know she was too late getting the genetic testing done and they were not able to do it. She should start some iron also 1-2 times a day.

## 2020-05-28 ENCOUNTER — ROUTINE PRENATAL (OUTPATIENT)
Dept: OBGYN | Facility: CLINIC | Age: 20
End: 2020-05-28
Payer: MEDICAID

## 2020-05-28 VITALS — BODY MASS INDEX: 43.58 KG/M2 | WEIGHT: 246 LBS | DIASTOLIC BLOOD PRESSURE: 60 MMHG | SYSTOLIC BLOOD PRESSURE: 108 MMHG

## 2020-05-28 DIAGNOSIS — Z34.83 ENCOUNTER FOR SUPERVISION OF OTHER NORMAL PREGNANCY, THIRD TRIMESTER: ICD-10-CM

## 2020-05-28 PROCEDURE — 90471 IMMUNIZATION ADMIN: CPT | Performed by: ADVANCED PRACTICE MIDWIFE

## 2020-05-28 PROCEDURE — 90040 PR PRENATAL FOLLOW UP: CPT | Performed by: ADVANCED PRACTICE MIDWIFE

## 2020-05-28 PROCEDURE — 90715 TDAP VACCINE 7 YRS/> IM: CPT | Performed by: ADVANCED PRACTICE MIDWIFE

## 2020-05-28 NOTE — LETTER
"Count Your Baby's Movements  Another step to a healthy delivery    Joelle Boss             Dept: [phone]    How Many Weeks Pregnant= 28w0d    Date to Begin Countin2020              How to use this chart    One way for your physician to keep track of your baby's health is by knowing how often the baby moves (or \"kicks\") in your womb.  You can help your physician to do this by using this chart every day.    Every day, you should see how many hours it takes for your baby to move 10 times.  Start in the morning, as soon as you get up.    · First, write down the time your baby moves until you get to 10.  · Check off one box every time your baby moves until you get to 10.  · Write down the time you finished counting in the last column.  · Total how long it took to count up all 10 movements.  · Finally, fill in the box that shows how long this took.  After counting 10 movements, you no longer have to count any more that day.  The next morning, just start counting again as soon as you get up.    What should you call a \"movement\"?  It is hard to say, because it will feel different from one mother to another and from one pregnancy to the next.  The important thing is that you count the movements the same way throughout your pregnancy.  If you have more questions, you should ask your physician.    Count carefully every day!  SAMPLE:  Week 28    How many hours did it take to feel 10 movements?       Start  Time     1     2     3     4     5     6     7     8     9     10   Finish Time   Mon 8:20 ·  ·  ·  ·  ·  ·  ·  ·  ·  ·  11:40                  Sat               Sun                 IMPORTANT: You should contact your physician if it takes more than two hours for you to feel 10 movements.  Each morning, write down the time and start to count the movements of your baby.  Keep track by checking off one box every time you feel one movement.  When you have " "felt 10 \"kicks\", write down the time you finished counting in the last column.  Then fill in the   box (over the check levi) for the number of hours it took.  Be sure to read the complete instructions on the previous page.            "

## 2020-05-28 NOTE — PROGRESS NOTES
OB follow up   + fetal movement.  No VB, LOF or UC's.  Phone # [phone]  Preferred pharmacy confirmed.  Kick count sheet given today.  TDap today  BTL signed

## 2020-05-28 NOTE — PROGRESS NOTES
Has patient been referred to outside provider?   no    Records available on chart?   n/a    Had physician visit? no  Indication: needs surgical consult    SUBJECTIVE:  Pt is a 20 y.o.   at 28w0d  gestation. Presents today for follow-up prenatal care. Has not been seen in ER or L & D since last visit. Reports good  fetal movement.     Leaking of fluid?       no    Dysuria?       no    Headaches?      no    N/V?          no    Cramping/contractions?      no        OBJECTIVE:   /60   Wt 111.6 kg (246 lb)   LMP 11/15/2019 (Exact Date)   BMI 43.58 kg/m²   Patients' weight gain, fluid intake and exercise level discussed.  Vitals, fundal height , fetal position, and FHR reviewed on flowsheet    Lab:No results found for this or any previous visit (from the past 336 hour(s)).    ASSESSMENT/ PLAN:   - IUP at 28w0d    - S = D   -   Patient Active Problem List    Diagnosis Date Noted   • Rubella non-immune status, antepartum 05/15/2020   • History of  delivery, currently pregnant; desires repeat NO BTL 2017         Tdap: today    Discussed again that BTL at her current age would be controversial. I have encouraged her to have conversation with MD.     - S/sx pregnancy and labor warning signs vs general discomforts discussed  - Fetal movements and kick counts reviewed. Adequate hydration reinforced  - Anticipatory guidance provided.   - RTC in 2 weeks for routine prenatal care.

## 2020-05-28 NOTE — NON-PROVIDER
TDap given to patient. L   Deltoid. Screening checklist reviewed with patient. VIS given. Verified by PRINCESS

## 2020-06-10 ENCOUNTER — ROUTINE PRENATAL (OUTPATIENT)
Dept: OBGYN | Facility: CLINIC | Age: 20
End: 2020-06-10
Payer: MEDICAID

## 2020-06-10 VITALS — DIASTOLIC BLOOD PRESSURE: 62 MMHG | WEIGHT: 248 LBS | BODY MASS INDEX: 43.93 KG/M2 | SYSTOLIC BLOOD PRESSURE: 108 MMHG

## 2020-06-10 DIAGNOSIS — O34.219 HISTORY OF CESAREAN DELIVERY, CURRENTLY PREGNANT: ICD-10-CM

## 2020-06-10 PROCEDURE — 90040 PR PRENATAL FOLLOW UP: CPT | Performed by: OBSTETRICS & GYNECOLOGY

## 2020-06-10 RX ORDER — FERROUS SULFATE 325(65) MG
325 TABLET ORAL DAILY
COMMUNITY
End: 2022-12-08

## 2020-06-10 NOTE — PROGRESS NOTES
OB Follow Up--- High Risk  Prev C/s x2           20 y.o. is a 29w5d presents in prenatal follow up.    Subjective:no complaints  . Fetal Movement  positive    Problem List:has History of  delivery, currently pregnant; desires repeat NO BTL and Rubella non-immune status, antepartum on their problem list.     Objective:   /62   Wt 112.5 kg (248 lb)   LMP 11/15/2019 (Exact Date)   BMI 43.93 kg/m²     Abdomen:  S=D  Extremities:1+ edema        Lab:No results found for this or any previous visit (from the past 336 hour(s)).      Assessment:    29w5d     - High Risk  Prev C/S x2    No pain, bleeding, unusual discharge or leeking    Plan:  4 weeks  Continue water intake  Ambulate nightly after 37 weeks  Continue Kick counts

## 2020-06-10 NOTE — NON-PROVIDER
OB follow up   + fetal movement.  No VB, LOF or UC's.  Wt: 248lb      BP: 108/62  Phone # [phone]  Preferred pharmacy confirmed.  C/o having some discharge yellow and pasty, finished Monistat 7. Having some itchiness.  3rd trimester lab orders pended and instructions given to patient  Patient would like to discuss her birth plan.

## 2020-06-30 ENCOUNTER — ROUTINE PRENATAL (OUTPATIENT)
Dept: OBGYN | Facility: CLINIC | Age: 20
End: 2020-06-30
Payer: MEDICAID

## 2020-06-30 VITALS — SYSTOLIC BLOOD PRESSURE: 124 MMHG | BODY MASS INDEX: 44.46 KG/M2 | DIASTOLIC BLOOD PRESSURE: 60 MMHG | WEIGHT: 251 LBS

## 2020-06-30 DIAGNOSIS — Z34.83 ENCOUNTER FOR SUPERVISION OF OTHER NORMAL PREGNANCY, THIRD TRIMESTER: ICD-10-CM

## 2020-06-30 DIAGNOSIS — N76.0 ACUTE VAGINITIS: ICD-10-CM

## 2020-06-30 PROCEDURE — 87210 SMEAR WET MOUNT SALINE/INK: CPT | Performed by: ADVANCED PRACTICE MIDWIFE

## 2020-06-30 PROCEDURE — 90040 PR PRENATAL FOLLOW UP: CPT | Performed by: ADVANCED PRACTICE MIDWIFE

## 2020-06-30 RX ORDER — METRONIDAZOLE 500 MG/1
500 TABLET ORAL 2 TIMES DAILY
Qty: 14 TAB | Refills: 0 | Status: SHIPPED | OUTPATIENT
Start: 2020-06-30 | End: 2020-07-24

## 2020-06-30 NOTE — PROGRESS NOTES
Has patient been referred to outside provider?   no    Records available on chart?   n/a    Had physician visit? yes  Indication:  Surgical consult    SUBJECTIVE:  Pt is a 20 y.o.   at 32w4d  gestation. Presents today for follow-up prenatal care. Has not been seen in ER or L & D since last visit. Reports good  fetal movement.     Leaking of fluid?       no    Dysuria?       no    Headaches?      no    N/V?          no    Cramping/contractions?      no    C/o vaginal discharge and itching.     OBJECTIVE:   /60   Wt 113.9 kg (251 lb)   LMP 11/15/2019 (Exact Date)   BMI 44.46 kg/m²   Patients' weight gain, fluid intake and exercise level discussed.  Vitals, fundal height , fetal position, and FHR reviewed on flowsheet    Lab:No results found for this or any previous visit (from the past 336 hour(s)).      ASSESSMENT/ PLAN:   - IUP at 32w4d    - S = D   -   Patient Active Problem List   Diagnosis Date Noted  • Rubella non-immune status, antepartum 05/15/2020  • History of  delivery, currently pregnant; desires repeat NO BTL 2017        Tdap: Given  PP BCM: Does not desire BTL    Wet prep: Positive Clue cells, Positive Yeast rods, Neg whiff    - S/sx pregnancy and labor warning signs vs general discomforts discussed  - Fetal movements and kick counts reviewed. Adequate hydration reinforced.  - Did discuss current COVID policies related to outpatient and inpatient visits.   - Anticipatory guidance provided.   -Tx for BV and yeast infection sent to patient's pharmacy  -Discussed pregnancy support band to help with pressure pt is feeling in her pelvis.   -Encouraged the use of compression stockings or knee high baseball socks to help with lower extremity non pitting edema  - RTC in 2 weeks for routine prenatal care.

## 2020-06-30 NOTE — PROGRESS NOTES
Pt here today for OB follow up  Reports +FM  WT: 251 lb  BP: 124/60  Preferred pharmacy verified with pt.  Pt states no complaints or concerns today  Good # [phone]

## 2020-06-30 NOTE — PROGRESS NOTES
Has patient been referred to outside provider?   no    Records available on chart?   n/a    Had physician visit? yes  Indication:  Surgical consult    SUBJECTIVE:  Pt is a 20 y.o.   at 32w4d  gestation. Presents today for follow-up prenatal care. Has not been seen in ER or L & D since last visit. Reports good  fetal movement.     Leaking of fluid?       no    Dysuria?       no    Headaches?      no    N/V?          no    Cramping/contractions?      no    C/o vaginal discharge and itching.     OBJECTIVE:   /60   Wt 113.9 kg (251 lb)   LMP 11/15/2019 (Exact Date)   BMI 44.46 kg/m²   Patients' weight gain, fluid intake and exercise level discussed.  Vitals, fundal height , fetal position, and FHR reviewed on flowsheet    Lab:No results found for this or any previous visit (from the past 336 hour(s)).    Wet prep: Positive Clue cells, Positive Yeast rods, Neg whiff    ASSESSMENT/ PLAN:   - IUP at 32w4d    - S = D   -   Patient Active Problem List    Diagnosis Date Noted   • Rubella non-immune status, antepartum 05/15/2020   • History of  delivery, currently pregnant; desires repeat NO BTL 2017         Tdap: Given  PP BCM: Does not desire BTL    - S/sx pregnancy and labor warning signs vs general discomforts discussed  - Fetal movements and kick counts reviewed. Adequate hydration reinforced.  - Did discuss current COVID policies related to outpatient and inpatient visits.   - Anticipatory guidance provided.   -Tx for BV and yeast infection sent to patient's pharmacy  -Discussed pregnancy support band to help with pressure pt is feeling in her pelvis.   -Encouraged the use of compression stockings or knee high baseball socks to help with lower extremity non pitting edema  - RTC in 2 weeks for routine prenatal care.

## 2020-07-15 ENCOUNTER — TELEPHONE (OUTPATIENT)
Dept: OBGYN | Facility: CLINIC | Age: 20
End: 2020-07-15

## 2020-07-24 ENCOUNTER — ROUTINE PRENATAL (OUTPATIENT)
Dept: OBGYN | Facility: CLINIC | Age: 20
End: 2020-07-24
Payer: MEDICAID

## 2020-07-24 ENCOUNTER — HOSPITAL ENCOUNTER (OUTPATIENT)
Facility: MEDICAL CENTER | Age: 20
End: 2020-07-24
Attending: ADVANCED PRACTICE MIDWIFE
Payer: MEDICAID

## 2020-07-24 VITALS — WEIGHT: 255 LBS | SYSTOLIC BLOOD PRESSURE: 108 MMHG | DIASTOLIC BLOOD PRESSURE: 60 MMHG | BODY MASS INDEX: 45.17 KG/M2

## 2020-07-24 DIAGNOSIS — Z34.83 ENCOUNTER FOR SUPERVISION OF OTHER NORMAL PREGNANCY, THIRD TRIMESTER: ICD-10-CM

## 2020-07-24 DIAGNOSIS — O99.013 ANEMIA AFFECTING PREGNANCY IN THIRD TRIMESTER: ICD-10-CM

## 2020-07-24 LAB
FORWARD REASON: SPWHY: NORMAL
FORWARDED TO LAB: SPWHR: NORMAL
SPECIMEN SENT: SPWT1: NORMAL

## 2020-07-24 PROCEDURE — 59402 PR NEW OB HIGH RISK: CPT | Performed by: ADVANCED PRACTICE MIDWIFE

## 2020-07-24 NOTE — PROGRESS NOTES
Pt here today for OB follow up  Pt states no complainys  Reports +FM  Good # [phone]  Pharmacy Confirmed.  Chaperone offered and declined.   GBS today

## 2020-07-24 NOTE — NON-PROVIDER
"Has patient been referred to outside provider?   no    Records available on chart?   no    Had physician visit? Yes   Indication:  Planned repeat     SUBJECTIVE:  Pt is a 20 y.o.   at 36w0d  gestation. Presents today for follow-up prenatal care. Has not been seen in ER or L & D since last visit. Reports pos fetal movement.     Leaking of fluid?       no    Dysuria?       no    Headaches?      no    N/V?          yes    Cramping/contractions?      Occasional    Has some abdominal tightening in the evenings, sometimes every 20min with pelvic pressure. Goes away when she gets up and walks around.    Pt. Reports return of nausea mostly in the mornings. Reports is very busy with work and can go long periods without meals. She does not like meat and reports most meals consist of \"things like rice.\" She has stopped taking her prenatal vitamins due to the nausea, was taking them every morning. Today was last day of work and she expects to be able to eat better meals more frequently now.     OBJECTIVE:   /60   Wt 115.7 kg (255 lb)   LMP 11/15/2019 (Exact Date)   BMI 45.17 kg/m²   Patients' weight gain, fluid intake and exercise level discussed.  Vitals, fundal height , fetal position, and FHR reviewed on flowsheet    Lab:No results found for this or any previous visit (from the past 336 hour(s)).    ASSESSMENT/ PLAN:   - IUP at 36w0d    - S = D   -   Patient Active Problem List    Diagnosis Date Noted   • Rubella non-immune status, antepartum 05/15/2020   • History of  delivery, currently pregnant; desires repeat NO BTL 2017       -Discussed eating frequent small meals and adding more protein to diet to reduce nausea. Non-meat protein options including dairy, protein shakes, and nuts. Encouraged to try taking prenatals in the evening to reduce associated nausea.  -Scheduled with Dr. Murcia for pre-op next week and request sent for repeat  to be scheduled 20  -GBS swab done " today  - S/sx pregnancy and labor warning signs vs general discomforts discussed  - Fetal movements and kick counts reviewed. Adequate hydration reinforced.  - Did discuss current COVID policies related to outpatient and inpatient visits.   - Anticipatory guidance provided.   - RTC in 1 weeks for routine prenatal care.

## 2020-07-29 ENCOUNTER — ROUTINE PRENATAL (OUTPATIENT)
Dept: OBGYN | Facility: CLINIC | Age: 20
End: 2020-07-29
Payer: MEDICAID

## 2020-07-29 VITALS — SYSTOLIC BLOOD PRESSURE: 110 MMHG | DIASTOLIC BLOOD PRESSURE: 58 MMHG | BODY MASS INDEX: 44.82 KG/M2 | WEIGHT: 253 LBS

## 2020-07-29 DIAGNOSIS — Z34.90 PREGNANCY, UNSPECIFIED GESTATIONAL AGE: ICD-10-CM

## 2020-07-29 DIAGNOSIS — O34.219 HISTORY OF CESAREAN DELIVERY, CURRENTLY PREGNANT: ICD-10-CM

## 2020-07-29 PROCEDURE — 90040 PR PRENATAL FOLLOW UP: CPT | Performed by: OBSTETRICS & GYNECOLOGY

## 2020-08-05 ENCOUNTER — OFFICE VISIT (OUTPATIENT)
Dept: ADMISSIONS | Facility: MEDICAL CENTER | Age: 20
End: 2020-08-05
Attending: OBSTETRICS & GYNECOLOGY
Payer: MEDICAID

## 2020-08-05 ENCOUNTER — ROUTINE PRENATAL (OUTPATIENT)
Dept: OBGYN | Facility: CLINIC | Age: 20
End: 2020-08-05
Payer: MEDICAID

## 2020-08-05 VITALS — WEIGHT: 255 LBS | BODY MASS INDEX: 45.17 KG/M2 | DIASTOLIC BLOOD PRESSURE: 76 MMHG | SYSTOLIC BLOOD PRESSURE: 118 MMHG

## 2020-08-05 DIAGNOSIS — Z34.83 ENCOUNTER FOR SUPERVISION OF OTHER NORMAL PREGNANCY, THIRD TRIMESTER: ICD-10-CM

## 2020-08-05 PROCEDURE — 90040 PR PRENATAL FOLLOW UP: CPT | Performed by: ADVANCED PRACTICE MIDWIFE

## 2020-08-05 NOTE — PROGRESS NOTES
SUBJECTIVE:  Pt is a 20 y.o.   at 37w5d  gestation. Presents today for follow-up prenatal care. Has not been seen in ER or L & D since last visit. Reports good  fetal movement.     Leaking of fluid?       no    Dysuria?       no    Headaches?      no    N/V?          no    Cramping/contractions?      some    Patient reports continued pressure and low back pain.     OBJECTIVE:   /76   Wt 115.7 kg (255 lb)   LMP 11/15/2019 (Exact Date)   BMI 45.17 kg/m²   Patients' weight gain, fluid intake and exercise level discussed.  Vitals, fundal height , fetal position, and FHR reviewed on flowsheet    Lab:  Recent Results (from the past 336 hour(s))   SPECIMEN FORWARDED    Collection Time: 20  6:54 PM   Result Value Ref Range    Forwarded to Lab: [facility]     Forward Reason: Insurance     Specimen Sent: Swab        ASSESSMENT/ PLAN:   - IUP at 37w5d    - S = D   -   Patient Active Problem List    Diagnosis Date Noted   • Anemia affecting pregnancy in third trimester 2020   • Rubella non-immune status, antepartum 05/15/2020   • Pregnant 2017   • History of  delivery, currently pregnant; desires repeat NO BTL 2017         - S/sx pregnancy and labor warning signs vs general discomforts discussed  - Fetal movements and kick counts reviewed. Adequate hydration reinforced.  - Did discuss current COVID policies related to outpatient and inpatient visits.   - Anticipatory guidance provided.   - RTC in 1 weeks for routine prenatal care.

## 2020-08-05 NOTE — PROGRESS NOTES
OB follow up   + fetal movement. Active  No VB, LOF or UC's.  Wt:255.0LBS       BP: 118/76  Phone # [phone]  Preferred pharmacy confirmed.  C/o  Pelvic pressure

## 2020-08-11 ENCOUNTER — HOSPITAL ENCOUNTER (OUTPATIENT)
Dept: OBGYN | Facility: MEDICAL CENTER | Age: 20
End: 2020-08-11

## 2020-08-11 DIAGNOSIS — Z34.83 ENCOUNTER FOR SUPERVISION OF OTHER NORMAL PREGNANCY, THIRD TRIMESTER: ICD-10-CM

## 2020-08-11 LAB — COVID ORDER STATUS COVID19: NORMAL

## 2020-08-11 PROCEDURE — C9803 HOPD COVID-19 SPEC COLLECT: HCPCS | Performed by: OBSTETRICS & GYNECOLOGY

## 2020-08-11 PROCEDURE — U0003 INFECTIOUS AGENT DETECTION BY NUCLEIC ACID (DNA OR RNA); SEVERE ACUTE RESPIRATORY SYNDROME CORONAVIRUS 2 (SARS-COV-2) (CORONAVIRUS DISEASE [COVID-19]), AMPLIFIED PROBE TECHNIQUE, MAKING USE OF HIGH THROUGHPUT TECHNOLOGIES AS DESCRIBED BY CMS-2020-01-R: HCPCS

## 2020-08-11 NOTE — PROGRESS NOTES
Pt here for covid screen. Test performed; pt tolerated well. Pt given self isolating discharge instructions, verbalizes understanding.

## 2020-08-12 LAB
SARS-COV-2 RNA RESP QL NAA+PROBE: NOTDETECTED
SPECIMEN SOURCE: NORMAL

## 2020-08-14 ENCOUNTER — ANESTHESIA (OUTPATIENT)
Dept: OBGYN | Facility: MEDICAL CENTER | Age: 20
End: 2020-08-14

## 2020-08-14 ENCOUNTER — HOSPITAL ENCOUNTER (INPATIENT)
Facility: MEDICAL CENTER | Age: 20
LOS: 2 days | End: 2020-08-16
Attending: OBSTETRICS & GYNECOLOGY | Admitting: OBSTETRICS & GYNECOLOGY
Payer: MEDICAID

## 2020-08-14 ENCOUNTER — ANESTHESIA EVENT (OUTPATIENT)
Dept: OBGYN | Facility: MEDICAL CENTER | Age: 20
End: 2020-08-14
Payer: MEDICAID

## 2020-08-14 LAB
BASOPHILS # BLD AUTO: 0.3 % (ref 0–1.8)
BASOPHILS # BLD: 0.03 K/UL (ref 0–0.12)
EOSINOPHIL # BLD AUTO: 0.02 K/UL (ref 0–0.51)
EOSINOPHIL NFR BLD: 0.2 % (ref 0–6.9)
ERYTHROCYTE [DISTWIDTH] IN BLOOD BY AUTOMATED COUNT: 42.1 FL (ref 35.9–50)
ERYTHROCYTE [DISTWIDTH] IN BLOOD BY AUTOMATED COUNT: 43.7 FL (ref 35.9–50)
HCT VFR BLD AUTO: 31 % (ref 37–47)
HCT VFR BLD AUTO: 33.6 % (ref 37–47)
HGB BLD-MCNC: 10.9 G/DL (ref 12–16)
HGB BLD-MCNC: 9.5 G/DL (ref 12–16)
HOLDING TUBE BB 8507: NORMAL
IMM GRANULOCYTES # BLD AUTO: 0.04 K/UL (ref 0–0.11)
IMM GRANULOCYTES NFR BLD AUTO: 0.4 % (ref 0–0.9)
LYMPHOCYTES # BLD AUTO: 3.03 K/UL (ref 1–4.8)
LYMPHOCYTES NFR BLD: 31.6 % (ref 22–41)
MCH RBC QN AUTO: 22.6 PG (ref 27–33)
MCH RBC QN AUTO: 23.5 PG (ref 27–33)
MCHC RBC AUTO-ENTMCNC: 30.6 G/DL (ref 33.6–35)
MCHC RBC AUTO-ENTMCNC: 32.4 G/DL (ref 33.6–35)
MCV RBC AUTO: 72.4 FL (ref 81.4–97.8)
MCV RBC AUTO: 73.6 FL (ref 81.4–97.8)
MONOCYTES # BLD AUTO: 0.95 K/UL (ref 0–0.85)
MONOCYTES NFR BLD AUTO: 9.9 % (ref 0–13.4)
NEUTROPHILS # BLD AUTO: 5.53 K/UL (ref 2–7.15)
NEUTROPHILS NFR BLD: 57.6 % (ref 44–72)
NRBC # BLD AUTO: 0 K/UL
NRBC BLD-RTO: 0 /100 WBC
PLATELET # BLD AUTO: 250 K/UL (ref 164–446)
PLATELET # BLD AUTO: 257 K/UL (ref 164–446)
PMV BLD AUTO: 10.1 FL (ref 9–12.9)
PMV BLD AUTO: 9.8 FL (ref 9–12.9)
RBC # BLD AUTO: 4.21 M/UL (ref 4.2–5.4)
RBC # BLD AUTO: 4.64 M/UL (ref 4.2–5.4)
WBC # BLD AUTO: 14.2 K/UL (ref 4.8–10.8)
WBC # BLD AUTO: 9.6 K/UL (ref 4.8–10.8)

## 2020-08-14 PROCEDURE — 501445 HCHG STAPLER, SKIN DISP: Performed by: OBSTETRICS & GYNECOLOGY

## 2020-08-14 PROCEDURE — 160009 HCHG ANES TIME/MIN: Performed by: OBSTETRICS & GYNECOLOGY

## 2020-08-14 PROCEDURE — 160029 HCHG SURGERY MINUTES - 1ST 30 MINS LEVEL 4: Performed by: OBSTETRICS & GYNECOLOGY

## 2020-08-14 PROCEDURE — 700105 HCHG RX REV CODE 258: Performed by: ANESTHESIOLOGY

## 2020-08-14 PROCEDURE — 700102 HCHG RX REV CODE 250 W/ 637 OVERRIDE(OP): Performed by: ANESTHESIOLOGY

## 2020-08-14 PROCEDURE — 36415 COLL VENOUS BLD VENIPUNCTURE: CPT

## 2020-08-14 PROCEDURE — 59510 CESAREAN DELIVERY: CPT | Performed by: OBSTETRICS & GYNECOLOGY

## 2020-08-14 PROCEDURE — 160035 HCHG PACU - 1ST 60 MINS PHASE I: Performed by: OBSTETRICS & GYNECOLOGY

## 2020-08-14 PROCEDURE — 700111 HCHG RX REV CODE 636 W/ 250 OVERRIDE (IP): Performed by: ANESTHESIOLOGY

## 2020-08-14 PROCEDURE — A9270 NON-COVERED ITEM OR SERVICE: HCPCS | Performed by: ANESTHESIOLOGY

## 2020-08-14 PROCEDURE — 85025 COMPLETE CBC W/AUTO DIFF WBC: CPT

## 2020-08-14 PROCEDURE — 160002 HCHG RECOVERY MINUTES (STAT): Performed by: OBSTETRICS & GYNECOLOGY

## 2020-08-14 PROCEDURE — 59514 CESAREAN DELIVERY ONLY: CPT | Mod: 80

## 2020-08-14 PROCEDURE — 700111 HCHG RX REV CODE 636 W/ 250 OVERRIDE (IP): Performed by: OBSTETRICS & GYNECOLOGY

## 2020-08-14 PROCEDURE — 770002 HCHG ROOM/CARE - OB PRIVATE (112)

## 2020-08-14 PROCEDURE — 87529 HSV DNA AMP PROBE: CPT

## 2020-08-14 PROCEDURE — 85027 COMPLETE CBC AUTOMATED: CPT

## 2020-08-14 PROCEDURE — 160041 HCHG SURGERY MINUTES - EA ADDL 1 MIN LEVEL 4: Performed by: OBSTETRICS & GYNECOLOGY

## 2020-08-14 PROCEDURE — 160048 HCHG OR STATISTICAL LEVEL 1-5: Performed by: OBSTETRICS & GYNECOLOGY

## 2020-08-14 PROCEDURE — 700101 HCHG RX REV CODE 250: Performed by: ANESTHESIOLOGY

## 2020-08-14 RX ORDER — HYDROMORPHONE HYDROCHLORIDE 1 MG/ML
0.4 INJECTION, SOLUTION INTRAMUSCULAR; INTRAVENOUS; SUBCUTANEOUS
Status: ACTIVE | OUTPATIENT
Start: 2020-08-14 | End: 2020-08-15

## 2020-08-14 RX ORDER — MEPERIDINE HYDROCHLORIDE 25 MG/ML
INJECTION INTRAMUSCULAR; INTRAVENOUS; SUBCUTANEOUS PRN
Status: DISCONTINUED | OUTPATIENT
Start: 2020-08-14 | End: 2020-08-14 | Stop reason: SURG

## 2020-08-14 RX ORDER — DEXAMETHASONE SODIUM PHOSPHATE 4 MG/ML
INJECTION, SOLUTION INTRA-ARTICULAR; INTRALESIONAL; INTRAMUSCULAR; INTRAVENOUS; SOFT TISSUE PRN
Status: DISCONTINUED | OUTPATIENT
Start: 2020-08-14 | End: 2020-08-14 | Stop reason: SURG

## 2020-08-14 RX ORDER — BUPIVACAINE HYDROCHLORIDE 5 MG/ML
INJECTION, SOLUTION EPIDURAL; INTRACAUDAL PRN
Status: DISCONTINUED | OUTPATIENT
Start: 2020-08-14 | End: 2020-08-14 | Stop reason: SURG

## 2020-08-14 RX ORDER — SODIUM CHLORIDE, SODIUM GLUCONATE, SODIUM ACETATE, POTASSIUM CHLORIDE AND MAGNESIUM CHLORIDE 526; 502; 368; 37; 30 MG/100ML; MG/100ML; MG/100ML; MG/100ML; MG/100ML
500 INJECTION, SOLUTION INTRAVENOUS CONTINUOUS
Status: DISCONTINUED | OUTPATIENT
Start: 2020-08-14 | End: 2020-08-16 | Stop reason: HOSPADM

## 2020-08-14 RX ORDER — HYDROMORPHONE HYDROCHLORIDE 1 MG/ML
0.2 INJECTION, SOLUTION INTRAMUSCULAR; INTRAVENOUS; SUBCUTANEOUS
Status: DISCONTINUED | OUTPATIENT
Start: 2020-08-14 | End: 2020-08-14 | Stop reason: HOSPADM

## 2020-08-14 RX ORDER — ONDANSETRON 2 MG/ML
4 INJECTION INTRAMUSCULAR; INTRAVENOUS
Status: DISCONTINUED | OUTPATIENT
Start: 2020-08-14 | End: 2020-08-14 | Stop reason: HOSPADM

## 2020-08-14 RX ORDER — OXYCODONE HYDROCHLORIDE 5 MG/1
5 TABLET ORAL EVERY 4 HOURS PRN
Status: DISCONTINUED | OUTPATIENT
Start: 2020-08-15 | End: 2020-08-14

## 2020-08-14 RX ORDER — OXYCODONE HYDROCHLORIDE 5 MG/1
5 TABLET ORAL EVERY 4 HOURS PRN
Status: ACTIVE | OUTPATIENT
Start: 2020-08-14 | End: 2020-08-15

## 2020-08-14 RX ORDER — IBUPROFEN 800 MG/1
800 TABLET ORAL EVERY 8 HOURS
Status: DISCONTINUED | OUTPATIENT
Start: 2020-08-14 | End: 2020-08-14

## 2020-08-14 RX ORDER — KETOROLAC TROMETHAMINE 30 MG/ML
30 INJECTION, SOLUTION INTRAMUSCULAR; INTRAVENOUS EVERY 6 HOURS
Status: COMPLETED | OUTPATIENT
Start: 2020-08-14 | End: 2020-08-15

## 2020-08-14 RX ORDER — IBUPROFEN 800 MG/1
800 TABLET ORAL EVERY 8 HOURS
Status: DISCONTINUED | OUTPATIENT
Start: 2020-08-15 | End: 2020-08-16 | Stop reason: HOSPADM

## 2020-08-14 RX ORDER — OXYCODONE HYDROCHLORIDE 10 MG/1
10 TABLET ORAL EVERY 4 HOURS PRN
Status: ACTIVE | OUTPATIENT
Start: 2020-08-14 | End: 2020-08-15

## 2020-08-14 RX ORDER — METOCLOPRAMIDE HYDROCHLORIDE 5 MG/ML
10 INJECTION INTRAMUSCULAR; INTRAVENOUS ONCE
Status: COMPLETED | OUTPATIENT
Start: 2020-08-14 | End: 2020-08-14

## 2020-08-14 RX ORDER — SODIUM CHLORIDE, SODIUM LACTATE, POTASSIUM CHLORIDE, CALCIUM CHLORIDE 600; 310; 30; 20 MG/100ML; MG/100ML; MG/100ML; MG/100ML
INJECTION, SOLUTION INTRAVENOUS PRN
Status: DISCONTINUED | OUTPATIENT
Start: 2020-08-14 | End: 2020-08-16 | Stop reason: HOSPADM

## 2020-08-14 RX ORDER — PHENYLEPHRINE HCL IN 0.9% NACL 0.5 MG/5ML
SYRINGE (ML) INTRAVENOUS PRN
Status: DISCONTINUED | OUTPATIENT
Start: 2020-08-14 | End: 2020-08-14 | Stop reason: SURG

## 2020-08-14 RX ORDER — MEPERIDINE HYDROCHLORIDE 25 MG/ML
12.5 INJECTION INTRAMUSCULAR; INTRAVENOUS; SUBCUTANEOUS
Status: DISCONTINUED | OUTPATIENT
Start: 2020-08-14 | End: 2020-08-14 | Stop reason: HOSPADM

## 2020-08-14 RX ORDER — SODIUM CHLORIDE, SODIUM GLUCONATE, SODIUM ACETATE, POTASSIUM CHLORIDE AND MAGNESIUM CHLORIDE 526; 502; 368; 37; 30 MG/100ML; MG/100ML; MG/100ML; MG/100ML; MG/100ML
1500 INJECTION, SOLUTION INTRAVENOUS ONCE
Status: COMPLETED | OUTPATIENT
Start: 2020-08-14 | End: 2020-08-14

## 2020-08-14 RX ORDER — CEFAZOLIN SODIUM 1 G/3ML
INJECTION, POWDER, FOR SOLUTION INTRAMUSCULAR; INTRAVENOUS PRN
Status: DISCONTINUED | OUTPATIENT
Start: 2020-08-14 | End: 2020-08-14 | Stop reason: SURG

## 2020-08-14 RX ORDER — ACETAMINOPHEN 500 MG
1000 TABLET ORAL EVERY 8 HOURS
Status: DISCONTINUED | OUTPATIENT
Start: 2020-08-15 | End: 2020-08-16 | Stop reason: HOSPADM

## 2020-08-14 RX ORDER — CITRIC ACID/SODIUM CITRATE 334-500MG
30 SOLUTION, ORAL ORAL ONCE
Status: COMPLETED | OUTPATIENT
Start: 2020-08-14 | End: 2020-08-14

## 2020-08-14 RX ORDER — ONDANSETRON 2 MG/ML
4 INJECTION INTRAMUSCULAR; INTRAVENOUS EVERY 6 HOURS PRN
Status: ACTIVE | OUTPATIENT
Start: 2020-08-14 | End: 2020-08-15

## 2020-08-14 RX ORDER — DIPHENHYDRAMINE HYDROCHLORIDE 50 MG/ML
12.5 INJECTION INTRAMUSCULAR; INTRAVENOUS EVERY 6 HOURS PRN
Status: ACTIVE | OUTPATIENT
Start: 2020-08-14 | End: 2020-08-15

## 2020-08-14 RX ORDER — HYDROMORPHONE HYDROCHLORIDE 1 MG/ML
0.2 INJECTION, SOLUTION INTRAMUSCULAR; INTRAVENOUS; SUBCUTANEOUS
Status: ACTIVE | OUTPATIENT
Start: 2020-08-14 | End: 2020-08-15

## 2020-08-14 RX ORDER — SODIUM CHLORIDE, SODIUM GLUCONATE, SODIUM ACETATE, POTASSIUM CHLORIDE AND MAGNESIUM CHLORIDE 526; 502; 368; 37; 30 MG/100ML; MG/100ML; MG/100ML; MG/100ML; MG/100ML
INJECTION, SOLUTION INTRAVENOUS
Status: DISCONTINUED | OUTPATIENT
Start: 2020-08-14 | End: 2020-08-14 | Stop reason: SURG

## 2020-08-14 RX ORDER — DIPHENHYDRAMINE HYDROCHLORIDE 50 MG/ML
12.5 INJECTION INTRAMUSCULAR; INTRAVENOUS
Status: DISCONTINUED | OUTPATIENT
Start: 2020-08-14 | End: 2020-08-14 | Stop reason: HOSPADM

## 2020-08-14 RX ORDER — MIDAZOLAM HYDROCHLORIDE 1 MG/ML
1 INJECTION INTRAMUSCULAR; INTRAVENOUS
Status: DISCONTINUED | OUTPATIENT
Start: 2020-08-14 | End: 2020-08-14 | Stop reason: HOSPADM

## 2020-08-14 RX ORDER — ACETAMINOPHEN 500 MG
1000 TABLET ORAL EVERY 8 HOURS
Status: DISCONTINUED | OUTPATIENT
Start: 2020-08-14 | End: 2020-08-14

## 2020-08-14 RX ORDER — OXYCODONE HCL 5 MG/5 ML
5 SOLUTION, ORAL ORAL
Status: DISCONTINUED | OUTPATIENT
Start: 2020-08-14 | End: 2020-08-14 | Stop reason: HOSPADM

## 2020-08-14 RX ORDER — DOCUSATE SODIUM 100 MG/1
100 CAPSULE, LIQUID FILLED ORAL 2 TIMES DAILY PRN
Status: DISCONTINUED | OUTPATIENT
Start: 2020-08-14 | End: 2020-08-16 | Stop reason: HOSPADM

## 2020-08-14 RX ORDER — DIPHENHYDRAMINE HYDROCHLORIDE 50 MG/ML
25 INJECTION INTRAMUSCULAR; INTRAVENOUS EVERY 6 HOURS PRN
Status: ACTIVE | OUTPATIENT
Start: 2020-08-14 | End: 2020-08-15

## 2020-08-14 RX ORDER — OXYCODONE HYDROCHLORIDE 5 MG/1
5 TABLET ORAL EVERY 4 HOURS PRN
Status: DISCONTINUED | OUTPATIENT
Start: 2020-08-14 | End: 2020-08-14

## 2020-08-14 RX ORDER — IPRATROPIUM BROMIDE AND ALBUTEROL SULFATE 2.5; .5 MG/3ML; MG/3ML
3 SOLUTION RESPIRATORY (INHALATION)
Status: DISCONTINUED | OUTPATIENT
Start: 2020-08-14 | End: 2020-08-14 | Stop reason: HOSPADM

## 2020-08-14 RX ORDER — ACETAMINOPHEN 500 MG
1000 TABLET ORAL EVERY 6 HOURS
Status: DISCONTINUED | OUTPATIENT
Start: 2020-08-14 | End: 2020-08-14

## 2020-08-14 RX ORDER — METOCLOPRAMIDE HYDROCHLORIDE 5 MG/ML
10 INJECTION INTRAMUSCULAR; INTRAVENOUS EVERY 6 HOURS PRN
Status: DISCONTINUED | OUTPATIENT
Start: 2020-08-14 | End: 2020-08-16 | Stop reason: HOSPADM

## 2020-08-14 RX ORDER — MORPHINE SULFATE 0.5 MG/ML
INJECTION, SOLUTION EPIDURAL; INTRATHECAL; INTRAVENOUS PRN
Status: DISCONTINUED | OUTPATIENT
Start: 2020-08-14 | End: 2020-08-14 | Stop reason: SURG

## 2020-08-14 RX ORDER — OXYCODONE HCL 5 MG/5 ML
10 SOLUTION, ORAL ORAL
Status: DISCONTINUED | OUTPATIENT
Start: 2020-08-14 | End: 2020-08-14 | Stop reason: HOSPADM

## 2020-08-14 RX ORDER — HALOPERIDOL 5 MG/ML
1 INJECTION INTRAMUSCULAR
Status: DISCONTINUED | OUTPATIENT
Start: 2020-08-14 | End: 2020-08-14 | Stop reason: HOSPADM

## 2020-08-14 RX ORDER — DIPHENHYDRAMINE HYDROCHLORIDE 50 MG/ML
12.5 INJECTION INTRAMUSCULAR; INTRAVENOUS EVERY 6 HOURS PRN
Status: DISCONTINUED | OUTPATIENT
Start: 2020-08-14 | End: 2020-08-14

## 2020-08-14 RX ORDER — HYDROMORPHONE HYDROCHLORIDE 1 MG/ML
1 INJECTION, SOLUTION INTRAMUSCULAR; INTRAVENOUS; SUBCUTANEOUS
Status: DISCONTINUED | OUTPATIENT
Start: 2020-08-14 | End: 2020-08-14 | Stop reason: HOSPADM

## 2020-08-14 RX ORDER — HYDROMORPHONE HYDROCHLORIDE 1 MG/ML
0.4 INJECTION, SOLUTION INTRAMUSCULAR; INTRAVENOUS; SUBCUTANEOUS
Status: DISCONTINUED | OUTPATIENT
Start: 2020-08-14 | End: 2020-08-14 | Stop reason: HOSPADM

## 2020-08-14 RX ADMIN — GLYCOPYRROLATE 0.2 MG: 0.2 INJECTION INTRAMUSCULAR; INTRAVENOUS at 14:28

## 2020-08-14 RX ADMIN — BUPIVACAINE HYDROCHLORIDE 2.5 ML: 5 INJECTION, SOLUTION EPIDURAL; INTRACAUDAL; PERINEURAL at 14:24

## 2020-08-14 RX ADMIN — Medication 100 MCG: at 15:00

## 2020-08-14 RX ADMIN — MORPHINE SULFATE 0.15 MG: 0.5 INJECTION, SOLUTION EPIDURAL; INTRATHECAL; INTRAVENOUS at 14:24

## 2020-08-14 RX ADMIN — Medication 100 MCG: at 15:03

## 2020-08-14 RX ADMIN — KETOROLAC TROMETHAMINE 30 MG: 30 INJECTION, SOLUTION INTRAMUSCULAR at 17:59

## 2020-08-14 RX ADMIN — MEPERIDINE HYDROCHLORIDE 25 MG: 25 INJECTION INTRAMUSCULAR; INTRAVENOUS; SUBCUTANEOUS at 14:46

## 2020-08-14 RX ADMIN — ACETAMINOPHEN 1000 MG: 500 TABLET ORAL at 17:59

## 2020-08-14 RX ADMIN — SODIUM CHLORIDE, SODIUM GLUCONATE, SODIUM ACETATE, POTASSIUM CHLORIDE AND MAGNESIUM CHLORIDE: 526; 502; 368; 37; 30 INJECTION, SOLUTION INTRAVENOUS at 14:07

## 2020-08-14 RX ADMIN — EPHEDRINE SULFATE 5 MG: 50 INJECTION INTRAMUSCULAR; INTRAVENOUS; SUBCUTANEOUS at 14:32

## 2020-08-14 RX ADMIN — Medication 100 MCG: at 14:46

## 2020-08-14 RX ADMIN — METOCLOPRAMIDE 10 MG: 5 INJECTION, SOLUTION INTRAMUSCULAR; INTRAVENOUS at 13:45

## 2020-08-14 RX ADMIN — SODIUM CHLORIDE, SODIUM GLUCONATE, SODIUM ACETATE, POTASSIUM CHLORIDE AND MAGNESIUM CHLORIDE 1500 ML: 526; 502; 368; 37; 30 INJECTION, SOLUTION INTRAVENOUS at 13:44

## 2020-08-14 RX ADMIN — FAMOTIDINE 20 MG: 10 INJECTION, SOLUTION INTRAVENOUS at 13:44

## 2020-08-14 RX ADMIN — DEXAMETHASONE SODIUM PHOSPHATE 8 MG: 4 INJECTION, SOLUTION INTRA-ARTICULAR; INTRALESIONAL; INTRAMUSCULAR; INTRAVENOUS; SOFT TISSUE at 14:27

## 2020-08-14 RX ADMIN — OXYTOCIN 1000 ML: 10 INJECTION, SOLUTION INTRAMUSCULAR; INTRAVENOUS at 14:45

## 2020-08-14 RX ADMIN — CEFAZOLIN 2 G: 330 INJECTION, POWDER, FOR SOLUTION INTRAMUSCULAR; INTRAVENOUS at 14:19

## 2020-08-14 RX ADMIN — SODIUM CITRATE AND CITRIC ACID MONOHYDRATE 30 ML: 500; 334 SOLUTION ORAL at 13:45

## 2020-08-14 RX ADMIN — OXYTOCIN 125 ML/HR: 10 INJECTION, SOLUTION INTRAMUSCULAR; INTRAVENOUS at 15:56

## 2020-08-14 ASSESSMENT — PATIENT HEALTH QUESTIONNAIRE - PHQ9
1. LITTLE INTEREST OR PLEASURE IN DOING THINGS: NOT AT ALL
2. FEELING DOWN, DEPRESSED, IRRITABLE, OR HOPELESS: NOT AT ALL
SUM OF ALL RESPONSES TO PHQ9 QUESTIONS 1 AND 2: 0

## 2020-08-14 ASSESSMENT — LIFESTYLE VARIABLES
EVER_SMOKED: NEVER
ALCOHOL_USE: NO

## 2020-08-14 ASSESSMENT — PAIN SCALES - GENERAL: PAIN_LEVEL: 0

## 2020-08-14 NOTE — ANESTHESIA PROCEDURE NOTES
Spinal Block    Date/Time: 8/14/2020 2:18 PM  Performed by: Kavon Caba III, M.D.  Authorized by: Kavon Caba III, M.D.     Patient Location:  OR  Start Time:  8/14/2020 2:18 PM  End Time:  8/14/2020 2:23 PM  Reason for Block: primary anesthetic    patient identified, IV checked, site marked, risks and benefits discussed, surgical consent, monitors and equipment checked, pre-op evaluation and timeout performed    Patient Position:  Sitting  Prep: ChloraPrep, patient draped and sterile technique    Monitoring:  Blood pressure, continuous pulse oximetry and heart rate  Approach:  Midline  Location:  L3-4  Injection Technique:  Single-shot  Skin infiltration:  Lidocaine  Strength:  1%  Dose:  3ml  Needle Type:  Pencan  Needle Gauge:  25 G  CSF flowing pre/post injection:  Yes  Sensory Level:  T6

## 2020-08-14 NOTE — ANESTHESIA POSTPROCEDURE EVALUATION
Patient: Joelle Goldberg    Procedure Summary     Date: 20 Room / Location: [facility] OR 01 / LABOR AND DELIVERY    Anesthesia Start: 1407 Anesthesia Stop: 153    Procedure:  SECTION, REPEAT (N/A Abdomen) Diagnosis: (same, magali)    Surgeon: Gladys Murcia D.O. Responsible Provider: Kavon Caba III, M.D.    Anesthesia Type: spinal ASA Status: 3          Final Anesthesia Type: spinal  Last vitals  BP   Blood Pressure: 114/56    Temp   36.6 °C (97.8 °F)    Pulse   Pulse: 82   Resp        SpO2   98 %      Anesthesia Post Evaluation    Patient location during evaluation: PACU  Patient participation: complete - patient participated  Level of consciousness: awake and alert  Pain score: 0    Airway patency: patent  Anesthetic complications: no  Cardiovascular status: hemodynamically stable  Respiratory status: acceptable  Hydration status: euvolemic    PONV: none           Nurse Pain Score: 0 (NPRS)

## 2020-08-14 NOTE — H&P
[facility] LABOR AND DELIVERY HISTORY AND PHYSICAL    PATIENT ID:  NAME:  Joelle Goldberg  MRN:               [id number]  YOB: 2000    CC:  19 yo female  here for an elective repeat     HPI:  Joelle Goldberg is a 20 y.o. female  at 39w0d by an ultrasound consistant with LMP. Patient's last menstrual period was 11/15/2019 (exact date).. Estimated Date of Delivery: 20  Patient presents complaining of no uterine contractions, with no loss of fluid.  postive fetal movement.  negative vaginal bleeding.  Pregnancy was complicated by 3rd trimester anemia.    ROS: Patient denies any fever chills, nausea, vomiting, headache, chest pain, shortness of breath, or dysuria or unusual swelling of hands or feet.     Prenatal Care: Obtained at [facility], 1st visit 20 with 9 total visits.  Third trimester BPs were approximately 120/75.  Total weight gain 8.5 kg during the pregnancy.    Prenatal Labs:   HepBsAg: negitive HIV: negitive Rubella: immune   RPR: negitive PAP: negitive GBS: none   GC/CT: negitive/negitive  Quad Screen:none   No results for input(s): WBC, RBC, HEMOGLOBIN, HEMATOCRIT, MCV, MCH, RDW, PLATELETCT, MPV, NEUTSPOLYS, LYMPHOCYTES, MONOCYTES, EOSINOPHILS, BASOPHILS, RBCMORPHOLO in the last 72 hours.  No results for input(s): SODIUM, POTASSIUM, CHLORIDE, CO2, GLUCOSE, BUN, CPKTOTAL in the last 72 hours.       IMAGING:  OB ultrasound: 20 - No Placenta Previa and Fetal Anatomy WNL.      POB Hx:  OB History    Para Term  AB Living   4 2 2   1 2   SAB TAB Ectopic Molar Multiple Live Births   1         2      # Outcome Date GA Lbr Howard/2nd Weight Sex Delivery Anes PTL Lv   4 Current            3 Term 18 39w0d  3.61 kg (7 lb 15.3 oz) M CS-LTranv Spinal N OXANA      Birth Comments: C/Section for repeat   2 SAB 16 18w0d   M SAB  Y FD      Birth Comments: baby passed on its own in [place] at home, but D&C needed   1 Term 03/17/15 40w3d  2.863 kg  (6 lb 5 oz) M CS-LTranv EPI N OXANA      Birth Comments: baby's heart rate dropped      Complications: Fetal Intolerance       PMH/Problem List:    Past Medical History:   Diagnosis Date   • Anemia affecting pregnancy in third trimester 2020   • Anxiety    • Pregnant      Patient Active Problem List    Diagnosis Date Noted   • Anemia affecting pregnancy in third trimester 2020   • Rubella non-immune status, antepartum 05/15/2020   • Pregnant 2017   • History of  delivery, currently pregnant; desires repeat NO BTL 2017       Current Outpatient Medications:  No current facility-administered medications on file prior to encounter.      Current Outpatient Medications on File Prior to Encounter   Medication Sig Dispense Refill   • ferrous sulfate 325 (65 Fe) MG tablet Take 325 mg by mouth every day.     • Prenatal MV-Min-Fe Fum-FA-DHA (PRENATAL 1 PO) Take  by mouth.         PSH:    Past Surgical History:   Procedure Laterality Date   • REPEAT C SECTION  3/11/2018    Procedure: REPEAT C SECTION;  Surgeon: Brandy Moreland M.D.;  Location: LABOR AND DELIVERY;  Service: Labor and Delivery   • PRIMARY C SECTION  3/17/2015    Performed by Blue Andino M.D. at LABOR AND DELIVERY       Allergies:   No Known Allergies    SH:  Social History     Socioeconomic History   • Marital status:      Spouse name: Not on file   • Number of children: Not on file   • Years of education: Not on file   • Highest education level: Not on file   Occupational History   • Not on file   Social Needs   • Financial resource strain: Not on file   • Food insecurity     Worry: Not on file     Inability: Not on file   • Transportation needs     Medical: Not on file     Non-medical: Not on file   Tobacco Use   • Smoking status: Never Smoker   • Smokeless tobacco: Never Used   Substance and Sexual Activity   • Alcohol use: Not Currently   • Drug use: Not Currently   • Sexual activity: Not Currently     Partners: Male  "    Birth control/protection: Condom     Comment: Unplanned pregnancy   Lifestyle   • Physical activity     Days per week: Not on file     Minutes per session: Not on file   • Stress: Not on file   Relationships   • Social connections     Talks on phone: Not on file     Gets together: Not on file     Attends [employer] service: Not on file     Active member of club or organization: Not on file     Attends meetings of clubs or organizations: Not on file     Relationship status: Not on file   • Intimate partner violence     Fear of current or ex partner: Not on file     Emotionally abused: Not on file     Physically abused: Not on file     Forced sexual activity: Not on file   Other Topics Concern   • Behavioral problems Not Asked   • Interpersonal relationships Not Asked   • Sad or not enjoying activities Not Asked   • Suicidal thoughts Not Asked   • Poor school performance Not Asked   • Reading difficulties Not Asked   • Speech difficulties Not Asked   • Writing difficulties Not Asked   • Inadequate sleep Not Asked   • Excessive TV viewing Not Asked   • Excessive video game use Not Asked   • Inadequate exercise Not Asked   • Sports related Not Asked   • Poor diet Not Asked   • Family concerns for drug/alcohol abuse Not Asked   • Poor oral hygiene Not Asked   • Bike safety Not Asked   • Family concerns vehicle safety Not Asked   Social History Narrative   • Not on file         PHYSICAL EXAM:  Vitals:    20 1220   BP: 132/69   Pulse: 68   Weight: 115.7 kg (255 lb)   Height: 1.626 m (5' 4\")     No data recorded.    General: No acute distress, resting comfortably in bed.  HEENT: normocephalic, nontraumatic, PERRLA, EOMI  Abdomen: gravid, nontender      SVE: defered  [place]: irregular; EFM: 125 with + accels and no decelerations, moderate variability      A/P: Intrauterine pregancy at 39w0d weeks who presents for an elective repeat scheduled .    1. IUP at term  2. Discussed risks and complications of "   3. GBS negative, no allergies  4. Ancef 2g  5. Has vulvar lesion for past 1 month, will swab for HSV prior to surgery     I discussed w/ pt risks of surgery including pain, bleeding, infection, risk of damage to intraabdominal structures including bowel/bladder/ureters.  Pt confirms she is accepting of blood transfusion in case of emergency.  All questions answered.      Gladys Murcia D.O.  [facility]

## 2020-08-14 NOTE — PROGRESS NOTES
1300 Report received from PEGGY Gallegos RN. Dr. Murcia at bedside discussing POC with pt and s/o.    1350 Dr. Caba at bedside, POC discussed with pt and s/o.    1402 Pt transferred to OR ambulatory    1533 Pt transferred from OR to PACU via gurney with side rails up    1615 Pt assisted with putting on abdominal binder    1638 Pt transported to PP via gurney with side rails up, infant in arms. Pt and infant stable.    1645 Report given to Tika TAI at bedside, bands verified and cuddles active.

## 2020-08-14 NOTE — ANESTHESIA TIME REPORT
Anesthesia Start and Stop Event Times     Date Time Event    2020 1344 Ready for Procedure     1407 Anesthesia Start     1536 Anesthesia Stop        Responsible Staff  20    Name Role Begin End    Kavon Caba III, M.D. Anesth 1407 1536        Preop Diagnosis (Free Text):  Pre-op Diagnosis     PREVIOUS , 39 WEEKS        Preop Diagnosis (Codes):    Post op Diagnosis  Pregnancy      Premium Reason  A. 3PM - 7AM    Comments:

## 2020-08-14 NOTE — ANESTHESIA PREPROCEDURE EVALUATION
Relevant Problems   OB   (+) History of  delivery, currently pregnant; desires repeat NO BTL       Physical Exam    Airway   Mallampati: III  TM distance: >3 FB  Neck ROM: limited       Cardiovascular - normal exam  Rhythm: regular  Rate: normal  (-) murmur     Dental - normal exam           Pulmonary - normal exam  Breath sounds clear to auscultation     Abdominal   (+) obese     Neurological - normal exam         Other findings: BMI > 40, term parturient for scheduled repeat             Anesthesia Plan    ASA 3       Plan - spinal   Neuraxial block will be primary anesthetic            Postoperative Plan: Postoperative administration of opioids is intended.    Pertinent diagnostic labs and testing reviewed    Informed Consent:    Anesthetic plan and risks discussed with patient.

## 2020-08-14 NOTE — ANESTHESIA QCDR
2019 [facility] Clinical Data Registry (for Quality Improvement)     Postoperative nausea/vomiting risk protocol (Adult = 18 yrs and Pediatric 3-17 yrs)- (430 and 463)  General inhalation anesthetic (NOT TIVA) with PONV risk factors: No  Provision of anti-emetic therapy with at least 2 different classes of agents: N/A  Patient DID NOT receive anti-emetic therapy and reason is documented in Medical Record: N/A    Multimodal Pain Management- (477)  Non-emergent surgery AND patient age >= 18: Yes  Use of Multimodal Pain Management, two or more drugs and/or interventions, NOT including systemic opioids: Yes  Exception: Documented allergy to multiple classes of analgesics: N/A    Smoking Abstinence (404)  Patient is current smoker (cigarette, pipe, e-cig, marijuanna): No  Elective Surgery:   Abstinence instructions provided prior to day of surgery:   Patient abstained from smoking on day of surgery:     Pre-Op Beta-Blocker in Isolated CABG (44)  Isolated CABG AND patient age >= 18: No  Beta-blocker admin within 24 hours of surgical incision:   Exception:of medical reason(s) for not administering beta blocker within 24 hours prior to surgical incision (e.g., not  indicated,other medical reason):     PACU assessment of acute postoperative pain prior to Anesthesia Care End- Applies to Patients Age = 18- (ABG7)  Initial PACU pain score is which of the following: < 7/10  Patient unable to report pain score: N/A    Post-anesthetic transfer of care checklist/protocol to PACU/ICU- (426 and 427)  Upon conclusion of case, patient transferred to which of the following locations: PACU/Non-ICU  Use of transfer checklist/protocol: Yes  Exclusion: Service Performed in Patient Hospital Room (and thus did not require transfer): N/A  Unplanned admission to ICU related to anesthesia service up through end of PACU care- (MD51)  Unplanned admission to ICU (not initially anticipated at anesthesia start time): No

## 2020-08-15 PROCEDURE — A9270 NON-COVERED ITEM OR SERVICE: HCPCS | Performed by: OBSTETRICS & GYNECOLOGY

## 2020-08-15 PROCEDURE — 700102 HCHG RX REV CODE 250 W/ 637 OVERRIDE(OP): Performed by: OBSTETRICS & GYNECOLOGY

## 2020-08-15 PROCEDURE — 770002 HCHG ROOM/CARE - OB PRIVATE (112)

## 2020-08-15 PROCEDURE — 700111 HCHG RX REV CODE 636 W/ 250 OVERRIDE (IP): Performed by: ANESTHESIOLOGY

## 2020-08-15 RX ADMIN — IBUPROFEN 800 MG: 800 TABLET, FILM COATED ORAL at 18:32

## 2020-08-15 RX ADMIN — KETOROLAC TROMETHAMINE 30 MG: 30 INJECTION, SOLUTION INTRAMUSCULAR at 06:07

## 2020-08-15 RX ADMIN — KETOROLAC TROMETHAMINE 30 MG: 30 INJECTION, SOLUTION INTRAMUSCULAR at 00:21

## 2020-08-15 RX ADMIN — KETOROLAC TROMETHAMINE 30 MG: 30 INJECTION, SOLUTION INTRAMUSCULAR at 12:02

## 2020-08-15 RX ADMIN — ACETAMINOPHEN 1000 MG: 500 TABLET ORAL at 14:57

## 2020-08-15 RX ADMIN — ACETAMINOPHEN 1000 MG: 500 TABLET ORAL at 22:02

## 2020-08-15 ASSESSMENT — EDINBURGH POSTNATAL DEPRESSION SCALE (EPDS)
I HAVE FELT SCARED OR PANICKY FOR NO GOOD REASON: NO, NOT AT ALL
I HAVE BEEN ANXIOUS OR WORRIED FOR NO GOOD REASON: NO, NOT AT ALL
I HAVE LOOKED FORWARD WITH ENJOYMENT TO THINGS: AS MUCH AS I EVER DID
I HAVE FELT SAD OR MISERABLE: NO, NOT AT ALL
I HAVE BEEN ABLE TO LAUGH AND SEE THE FUNNY SIDE OF THINGS: AS MUCH AS I ALWAYS COULD
THE THOUGHT OF HARMING MYSELF HAS OCCURRED TO ME: NEVER
I HAVE BLAMED MYSELF UNNECESSARILY WHEN THINGS WENT WRONG: NO, NEVER
THINGS HAVE BEEN GETTING ON TOP OF ME: NO, I HAVE BEEN COPING AS WELL AS EVER
I HAVE BEEN SO UNHAPPY THAT I HAVE HAD DIFFICULTY SLEEPING: NOT AT ALL
I HAVE BEEN SO UNHAPPY THAT I HAVE BEEN CRYING: NO, NEVER

## 2020-08-15 NOTE — CARE PLAN
Problem: Altered physiologic condition related to postoperative  delivery  Goal: Patient physiologically stable as evidenced by normal lochia, palpable uterine involution and vital signs within normal limits  Outcome: PROGRESSING AS EXPECTED  Note: Fundus firm, lochia light     Problem: Potential for postpartum infection related to surgical incision, compromised uterine condition, urinary tract or respiratory compromise  Goal: Patient will be afebrile and free from signs and symptoms of infection  Outcome: PROGRESSING AS EXPECTED  Note: VSS. No signs or symptoms of infection noted.

## 2020-08-15 NOTE — PROGRESS NOTES
[facility] POSTPARTUM PROGRESS NOTE    PATIENT ID:  NAME:  Joelle Goldberg  MRN:               [id number]  YOB: 2000     20 y.o. female  at 39w0d PPD#1 s/p elective repeat . Pregnancy complicated by 3rd trimester anemia. Delivery uncomplicated.     Subjective:   Breastfeeding: Yes  Diet:Regular  Bowel Movement:No  Voiding:Just had cath removed, hasn't urinated since.   Flatus: Yes  Pain Control:Yes  Bleeding: Light  Contraception: OCP is her plan  Denies HA, RUQ Pain, Swelling    Objective:    Vitals:    08/15/20 0255 08/15/20 0400 08/15/20 0505 08/15/20 0600   BP:    116/55   Pulse: 89 70 67 69   Resp: 16 17 14 18   Temp:    36.3 °C (97.4 °F)   TempSrc:    Temporal   SpO2: 93% 95% 94% 94%   Weight:       Height:         General: No acute distress, resting comfortably in bed.  HEENT: normocephalic, nontraumatic, PERRLA, EOMI  Cardiovascular: Heart RRR with no murmurs, rubs or gallops. Distal Pulses 2+  Respiratory: symmetric chest expansion, lungs CTA bilaterally with no wheezes rales or rhonci  Abdomen: soft, mildly tender, fundus firm, +BS      Recent Labs     20  1240 20  2312   WBC 9.6 14.2*   RBC 4.64 4.21   HEMOGLOBIN 10.9* 9.5*   HEMATOCRIT 33.6* 31.0*   MCV 72.4* 73.6*   MCH 23.5* 22.6*   RDW 42.1 43.7   PLATELETCT 257 250   MPV 9.8 10.1   NEUTSPOLYS 57.60  --    LYMPHOCYTES 31.60  --    MONOCYTES 9.90  --    EOSINOPHILS 0.20  --    BASOPHILS 0.30  --      No results for input(s): SODIUM, POTASSIUM, CHLORIDE, CO2, GLUCOSE, BUN, CPKTOTAL in the last 72 hours.    Current Meds:   Current Facility-Administered Medications   Medication Dose Frequency Provider Last Rate Last Dose   • electrolyte-A (PLASMALYTE-A) infusion 500 mL  500 mL Continuous Kavon Caba III, M.D.       • diphenhydrAMINE (BENADRYL) injection 12.5 mg  12.5 mg Q6HRS PRN Kavon Caba III, M.D.        Or   • diphenhydrAMINE (BENADRYL) injection 25 mg  25 mg Q6HRS PRN Kavon Caba III, M.D.         Or   • naloxone (NARCAN) 0.4 mg in NS 1,000 mL infusion  0.4 mg PRN [place]mitch Caba III, M.D.       • ondansetron (ZOFRAN) syringe/vial injection 4 mg  4 mg Q6HRS PRN Kavonmitch Caba III, M.D.       • ePHEDrine injection 10 mg  10 mg Q5 MIN PRN Athensmitch Caba III, M.D.       • HYDROmorphone pf (DILAUDID) injection 0.4 mg  0.4 mg Q2HRS PRN Athensmitch Caba III, M.D.       • HYDROmorphone pf (DILAUDID) injection 0.2 mg  0.2 mg Q2HRS PRN Athensmitch Caba III, M.D.       • oxyCODONE immediate release (ROXICODONE) tablet 10 mg  10 mg Q4HRS PRN Athensmitch Caba III, M.D.       • oxyCODONE immediate-release (ROXICODONE) tablet 5 mg  5 mg Q4HRS PRN Kavonmitch Caba III, M.D.       • ketorolac (TORADOL) injection 30 mg  30 mg Q6HR Kavon  Gertrudis TOWNSEND M.D.   30 mg at 08/15/20 0607   • oxytocin (PITOCIN) infusion (for postpartum)  2,000 mL/hr Once Gladys Murcia D.O.        Followed by   • oxytocin (PITOCIN) infusion (for postpartum)   mL/hr Continuous Gladys Murcia, D.O.   Stopped at 08/15/20 0030   • LR infusion   PRN Gladys Murcia, D.O.       • docusate sodium (COLACE) capsule 100 mg  100 mg BID PRN Gladys Murcia, D.O.       • metoclopramide (REGLAN) injection 10 mg  10 mg Q6HRS PRN Gladys Murcia, D.O.       • acetaminophen (TYLENOL) tablet 1,000 mg  1,000 mg Q8HRS Gladys Murcia, D.O.       • ibuprofen (MOTRIN) tablet 800 mg  800 mg Q8HRS Gladys Murcia D.O.       Last reviewed on 2020  3:14 PM by Jenise I. Bowers, R.N.       Assessment:  20 y.o. female  at 39w0d PPD#1 s/p .    Plan:   1. Encourage Ambulating  2. Encourage Breastfeeding  3. Disposition: Likely to discharge PPD2 or 3      Erin Ribeiro MD  PGY-1  [facility]

## 2020-08-15 NOTE — PROGRESS NOTES
The patient was assisted up to the bathroom, her kali pad and gown were changed; partial linen change done. She tolerated ambulation well and verbalizes understanding of the plan to remove her mchugh after 03:00.

## 2020-08-15 NOTE — PROGRESS NOTES
1650 Assumed care from labor and delivery. Oriented patient to room, call light, emergency light, TV, bed remote. Assessment completed, fundus firm, lochia light. ABD incision with mepilex dressing intact. Plan of care reviewed, verbalized understanding. Patient denies pain at this time, will call if pain med intervention needed.

## 2020-08-15 NOTE — OP REPORT
DATE OF SERVICE:   2020     PREOPERATIVE DIAGNOSES:  1.  Intrauterine pregnancy at 39w0d  2.  Hx of  section x2     POSTOPERATIVE DIAGNOSES:  1.  Intrauterine pregnancy at 39w0d  2.  same    PROCEDURE PERFORMED:  Repeat low transverse  section.     SURGEON:  Gladys Murcia DO     ASSISTANT: Erin Ribeiro MD, PGY2     ANESTHESIA:  Spinal.     ANESTHESIOLOGIST:  Dr. Gertrudis MD     SPECIMEN:  Placenta     ESTIMATED BLOOD LOSS:  800 mL     FINDINGS:  A live viable , weight 3395g, Apgars of 8 and 8 in vertex presentation with clear amniotic fluid.  Placenta was intact with 3 vessel cord.  There was a normal uterus, tubes, and ovaries bilaterally.     COMPLICATIONS:  None.     INDICATION FOR PROCEDURE: 21 yo  presenting at 39 weeks for scheduled repeat c/s.  The patient was informed of the risks and benefits of the procedure.  Risks included but were not limited to, bleeding, infection, injury to internal organs, and possible hysterectomy.  The patient expressed understanding the risks involved.  All questions were answered and the patient consented to the procedure.    PROCEDURE:  After appropriate consents were obtained, the patient was taken to the operating room where spinal anesthesia was applied without complications.  The patient was placed in the dorsal supine position with a left tilt of the hips.  The patient was then prepped and draped in the usual sterile manner and prophylactic antibiotics were administered.  Clamp test on the skin verified adequate anesthesia.  A low transverse incision was made with a scalpel and sharp dissection was carried out over subsequent layers of tissue including the fascia using the Bovie electrocautery for hemostasis.  The fascia was incised at the midline and the fascial incision was extended bilaterally using the curved Camacho scissors.  The superior edge of the fascial incision was grasped with the Kocher clamps, tented up, and the underlying  rectus muscles were dissected off bluntly and sharply using the curved Camacho scissors.  Attention was turned to the inferior edge which was grasped with Kocher clamps tented up and the underlying rectus muscles were dissected bluntly and sharply using the curved Camacho scissors.  The rectus muscles were divided at the midline and the peritoneum was identified and entered bluntly.  Care was taken to avoid the bladder as the peritoneal incision was extended superiorly and inferiorly.  The bladder blade was inserted and the vesicouterine peritoneum was identified, elevated, and cut laterally to both sides using the Metzenbaum scissors.  The bladder flap was created using blunt and sharp dissection with the Metzenbaum scissors until the bladder was far inferior from the incision site.  The bladder blade was reinserted and a transverse incision was made in the lower uterine segment using the scalpel.  The uterine incision was extended in a cephalocaudal fashion manually.  The amniotic sac was entered and the fluid was noted to be clear.  The surgeon's hand was placed into the uterine cavity and the fetal head was identified in the cephalic position.  The head was elevated to the uterine incision and delivered with the assistance of fundal pressure.  The  was examined for nuchal cord and there was none.  The  was delivered with the assistance of fundal pressure with ease.  On delivery the  was bulb suctioned and the cord was doubly clamped and cut.  The  was passed to the pediatricians for further care.  Oxytocin was administered by IV infusion to enhance uterine contraction.  The uterus was cleared of all clots and remaining products of conception.  The hysterotomy incision was reapproximated with 0 Vicryl suture in a running Lembert fashion.  Hemostasis was noted.  The tubes and ovaries were examined and noted to be normal.  The pericolic gutters were examined and any blood clots were removed.   The rectus muscles were examined and hemostatic.  The fascia was reapproximated with 0 Vicryl suture in a running fashion.  The subcutaneous fat was irrigated and any small bleeders were bovied for hemostasis.  The subcutaneous fat was then reapproximated with 3-0 plain gut suture in a running fashion.  The skin was reapproximated with 4-0 Monocryl suture in a running subcuticular stitch. A dressing was placed.  Sponge, needle, instrument, and lap counts were correct x2.  Patient tolerated the procedure well and went to recovery room in stable condition.        ____________________________________     Gladys Murcia DO

## 2020-08-15 NOTE — LACTATION NOTE
This note was copied from a baby's chart.  MADELINE is a 19 y/o  who delivered a 39 week gestation infant via scheduled c/section. She did not breastfeed her first two children past hospital stay because she felt like she did not have enought. Teach the supply demand system of breastfeeding and normal patternsof cluster feeding to signal the milk in and during growth spurts. Lactation education as in assessment. INJOY BF section of sam added with instructions for navigation to access education. Teach to call for assist as needed. Info given for availability of outpatient lactation support with encouragement to attend the BF Zoom meetings. MADELINE voices understanding.

## 2020-08-15 NOTE — CARE PLAN
Problem: Knowledge Deficit  Goal: Knowledge of disease process/condition, treatment plan, diagnostic tests, and medications will improve  Outcome: PROGRESSING AS EXPECTED  The patient stated that she understands the plan of care for this shift for herself and her infant     Problem: Discharge Barriers/Planning  Goal: Patient's continuum of care needs will be met  Outcome: PROGRESSING AS EXPECTED   The patient verbalizes understanding that she will stay at least 48 hours after her

## 2020-08-16 VITALS
HEART RATE: 79 BPM | BODY MASS INDEX: 43.54 KG/M2 | TEMPERATURE: 97.4 F | HEIGHT: 64 IN | OXYGEN SATURATION: 95 % | WEIGHT: 255 LBS | DIASTOLIC BLOOD PRESSURE: 74 MMHG | RESPIRATION RATE: 17 BRPM | SYSTOLIC BLOOD PRESSURE: 122 MMHG

## 2020-08-16 PROBLEM — Z34.90 PREGNANT: Status: RESOLVED | Noted: 2017-11-30 | Resolved: 2020-08-16

## 2020-08-16 PROCEDURE — A9270 NON-COVERED ITEM OR SERVICE: HCPCS | Performed by: NURSE PRACTITIONER

## 2020-08-16 PROCEDURE — A9270 NON-COVERED ITEM OR SERVICE: HCPCS | Performed by: OBSTETRICS & GYNECOLOGY

## 2020-08-16 PROCEDURE — 700102 HCHG RX REV CODE 250 W/ 637 OVERRIDE(OP): Performed by: NURSE PRACTITIONER

## 2020-08-16 PROCEDURE — 700102 HCHG RX REV CODE 250 W/ 637 OVERRIDE(OP): Performed by: OBSTETRICS & GYNECOLOGY

## 2020-08-16 RX ORDER — OXYCODONE HYDROCHLORIDE 5 MG/1
5 TABLET ORAL EVERY 4 HOURS PRN
Qty: 30 TAB | Refills: 0 | Status: SHIPPED | OUTPATIENT
Start: 2020-08-16 | End: 2020-08-22

## 2020-08-16 RX ORDER — FERROUS SULFATE 325(65) MG
325 TABLET ORAL 2 TIMES DAILY WITH MEALS
Status: DISCONTINUED | OUTPATIENT
Start: 2020-08-16 | End: 2020-08-16 | Stop reason: HOSPADM

## 2020-08-16 RX ORDER — OXYCODONE HYDROCHLORIDE 5 MG/1
5 TABLET ORAL EVERY 4 HOURS PRN
Status: DISCONTINUED | OUTPATIENT
Start: 2020-08-16 | End: 2020-08-16 | Stop reason: HOSPADM

## 2020-08-16 RX ORDER — IBUPROFEN 800 MG/1
800 TABLET ORAL EVERY 8 HOURS
Qty: 30 TAB | Refills: 1 | Status: SHIPPED | OUTPATIENT
Start: 2020-08-16 | End: 2022-12-08

## 2020-08-16 RX ORDER — PSEUDOEPHEDRINE HCL 30 MG
100 TABLET ORAL 2 TIMES DAILY PRN
Qty: 60 CAP | Refills: 1 | Status: SHIPPED | OUTPATIENT
Start: 2020-08-16 | End: 2022-12-08

## 2020-08-16 RX ADMIN — IBUPROFEN 800 MG: 800 TABLET, FILM COATED ORAL at 10:00

## 2020-08-16 RX ADMIN — IBUPROFEN 800 MG: 800 TABLET, FILM COATED ORAL at 02:21

## 2020-08-16 RX ADMIN — ACETAMINOPHEN 1000 MG: 500 TABLET ORAL at 06:10

## 2020-08-16 RX ADMIN — OXYCODONE 5 MG: 5 TABLET ORAL at 07:54

## 2020-08-16 RX ADMIN — FERROUS SULFATE TAB 325 MG (65 MG ELEMENTAL FE) 325 MG: 325 (65 FE) TAB at 07:43

## 2020-08-16 NOTE — PROGRESS NOTES
Received report from ZAIRE Silva. Assumed care of pt. Pt is A&O. RA. Complains of abdominal incision pain, medicated per MAR. Voiding adequately. Incision dressing, CDI. Breastfeeding q2-3h; supplementing prn. Updated on plan of care. Answered all questions and concerns. All needs met.

## 2020-08-16 NOTE — DISCHARGE SUMMARY
[facility]  NORMAL SPONTANEOUS VAGINAL DISCHARGE SUMMARY    PATIENT ID:  NAME:  Joelle Goldberg  MRN:               [id number]  YOB: 2000    DATE OF ADMISSION: 2020    DATE OF DISCHARGE: 2020     ADMITTING DIAGNOSIS:  1. Intrauterine pregnancy at 39w0d.      DISCHARGE DIAGNOSIS:  1. s/p         HOSPITAL COURSE: This is a 20 y.o. year old female admitted at 39w0d who presented with no contractions, no LOF, no vaginal bleeding, positive FM. Pregnancy was complicated by third trimester anemia. The patient had a good labor pattern after admission and proceeded to deliver a viable male infant weighing  7 lbs and 7.8 oz. Infants Apgars scores were 8 and 8 at one and five minutes. The patients postpartum course was uncomplicated and she was discharged home in stable condition on postpartum day #2.    PROCEDURES PERFORMED: Repeat Elective     COMPLICATIONS: none    DIET: regular    ACTIVITY: No intercourse and nothing inserted into the vagina for 5 weeks.    MEDICATIONS:  Current Outpatient Medications   Medication Sig Dispense Refill   • docusate sodium 100 MG Cap Take 100 mg by mouth 2 times a day as needed for Constipation. 60 Cap 1   • ibuprofen (MOTRIN) 800 MG Tab Take 1 Tab by mouth every 8 hours. 30 Tab 1   • oxyCODONE immediate-release (ROXICODONE) 5 MG Tab Take 1 Tab by mouth every four hours as needed for up to 6 days. 30 Tab 0         Objective:    Vitals:    08/15/20 0954 08/15/20 1421 08/15/20 1810 20 0600   BP: 116/70 108/56 111/64 122/74   Pulse: 85 66 76 79   Resp:    Temp: 36.2 °C (97.2 °F) 36.3 °C (97.4 °F) 36.3 °C (97.4 °F) 36.3 °C (97.4 °F)   TempSrc: Temporal Temporal Temporal Temporal   SpO2: 94% 96% 94% 95%   Weight:       Height:         General: No acute distress, resting comfortably in bed.  HEENT: normocephalic, nontraumatic, PERRLA, EOMI  Cardiovascular: Heart RRR with no murmurs, rubs or gallops. Distal Pulses 2+  Respiratory:  symmetric chest expansion, lungs CTA bilaterally with no wheezes rales or rhonci  Abdomen: soft, mildly tender, fundus firm, +BS      Recent Labs     08/14/20  1240 08/14/20  2312   WBC 9.6 14.2*   RBC 4.64 4.21   HEMOGLOBIN 10.9* 9.5*   HEMATOCRIT 33.6* 31.0*   MCV 72.4* 73.6*   MCH 23.5* 22.6*   RDW 42.1 43.7   PLATELETCT 257 250   MPV 9.8 10.1   NEUTSPOLYS 57.60  --    LYMPHOCYTES 31.60  --    MONOCYTES 9.90  --    EOSINOPHILS 0.20  --    BASOPHILS 0.30  --          - Follow up with [facility] in 5 weeks  - No intercourse and nothing inserted into the vagina for 5 weeks  - Continue taking prenatal   - Take iron supplements daily  - Tylenol and Motrin as needed for pain  - Colace as needed for constipation   - Oxycodone for pain prn.  - Return precautions: Increase vaginal bleeding, clots, chest pain/SOB, Increase edema especially in one limb, Severe abdominal pain, Fever > 100.4 BROCK Ribeiro MD  PGY-1  Family Medicine Resident

## 2020-08-16 NOTE — PROGRESS NOTES
Post Partum Progress Note    Name:   Joelle Goldberg   Date/Time:  2020 - 6:54 AM  Chief Admitting Dx:  Pregnancy  Indication for care in labor or delivery  Delivery Type:   for repeat  Post-Op/Post Partum Days #:  2    Subjective:  Abdominal pain: yes  Ambulating:   yes  Tolerating liquids:  yes  Tolerating food:  yes common adult  Flatus:   yes  BM:    no  Bleeding:   with a small amount of bleeding  Voiding:   yes  Dizziness:   no  Feeding:   breast    Vitals:    08/15/20 0954 08/15/20 1421 08/15/20 1810 20 0600   BP: 116/70 108/56 111/64 122/74   Pulse: 85 66 76 79   Resp:    Temp: 36.2 °C (97.2 °F) 36.3 °C (97.4 °F) 36.3 °C (97.4 °F) 36.3 °C (97.4 °F)   TempSrc: Temporal Temporal Temporal Temporal   SpO2: 94% 96% 94% 95%   Weight:       Height:           Exam:  Breast: Lactating yes  Abdomen: Abdomen soft, non-tender. BS normal. No masses,  No organomegaly  Fundal Tenderness:  no  Fundus Firm: yes  Incision: dry and intact dressing in place  Below umbilicus: yes  Perineum: perineum intact  Lochia: mild  Extremities: Normal extremities, peripheral pulses and reflexes normal, no edema, redness or tenderness in the calves or thighs    Meds:  Current Facility-Administered Medications   Medication Dose   • electrolyte-A (PLASMALYTE-A) infusion 500 mL  500 mL   • oxytocin (PITOCIN) infusion (for postpartum)   mL/hr   • LR infusion     • docusate sodium (COLACE) capsule 100 mg  100 mg   • metoclopramide (REGLAN) injection 10 mg  10 mg   • acetaminophen (TYLENOL) tablet 1,000 mg  1,000 mg   • ibuprofen (MOTRIN) tablet 800 mg  800 mg       Labs:   Recent Labs     20  1240 20  2312   WBC 9.6 14.2*   RBC 4.64 4.21   HEMOGLOBIN 10.9* 9.5*   HEMATOCRIT 33.6* 31.0*   MCV 72.4* 73.6*   MCH 23.5* 22.6*   MCHC 32.4* 30.6*   RDW 42.1 43.7   PLATELETCT 257 250   MPV 9.8 10.1       Assessment:  Chief Admitting Dx:  Pregnancy  Indication for care in labor or  delivery  Delivery Type:   for repeat  Tubal Ligation:  no    Plan:  Continue routine post partum care.  Asx anemia - start iron  Encourage breastfeeding and ambulation.  Anticipate DC home on POD#3    JACOB Guajardo.

## 2020-08-16 NOTE — LACTATION NOTE
@1032 met with MADELINE for follow-up visit, she states baby has been breastfeeding well so she has stopped supplementing with formula, she states she has been breastfeeding frequently, she states baby has been voiding and stooling, she denies breast pain when she breastfeeds, she has complaint of abdominal cramping when she breastfeeds, assured that is expected, encouraged frequent [id number], educated on cluster feeding, she states she feels comfortable with breastfeeding at this time    Plan:  Ad jessica breastfeeding at least Q 3 hours, more often if feeding cues noted  [id number]    MOB states she has [facility], educated on assistance available at [facility] after discharge, instructed to call her [place] counselor for breastfeeding assistance as needed after discharge    Written and verbal information provided on outpatient breastfeeding assistance available [facility] after [facility] and encouraged to call to schedule consult as needed, informed that [facility] [place] is on hold for the time being but if interested in attending to check the hospital web site for information on when it will resume, zoom meeting information provided as well    Encouraged to call for assistance as needed

## 2020-08-16 NOTE — CARE PLAN
Problem: Communication  Goal: The ability to communicate needs accurately and effectively will improve  Outcome: PROGRESSING AS EXPECTED  Note: Educated pt on plan of care, scheduled/prn medications. Educated on use of call light to call for assistance; pt calls appropriate and makes needs known.      Problem: Altered physiologic condition related to postoperative  delivery  Goal: Patient physiologically stable as evidenced by normal lochia, palpable uterine involution and vital signs within normal limits  Outcome: PROGRESSING AS EXPECTED  Note: Pt remains stable post c/s delivery. Lochia; light/rubra. Uterus is at umbilicus. VSS.     Problem: Potential knowledge deficit related to lack of understanding of self and  care  Goal: Patient will verbalize understanding of self and infant care  Outcome: PROGRESSING AS EXPECTED     Problem: Pain Management  Goal: Pain level will decrease to patient's comfort goal  Outcome: PROGRESSING AS EXPECTED

## 2020-08-16 NOTE — DISCHARGE INSTRUCTIONS
POSTPARTUM DISCHARGE INSTRUCTIONS FOR MOM    YOB: 2000   Age: 20 y.o.               Admit Date: 2020     Discharge Date: 2020  Attending Doctor:  Gladys Murcia D.O.                  Allergies:  Patient has no known allergies.    Discharged to home by car. Discharged via wheelchair, hospital escort: Yes.  Special equipment needed: Not Applicable  Belongings with: Personal  Be sure to schedule a follow-up appointment with your primary care doctor or any specialists as instructed.     Discharge Plan:   Diet Plan: Discussed  Activity Level: Discussed  Confirmed Follow up Appointment: Patient to Call and Schedule Appointment  Confirmed Symptoms Management: Discussed  Medication Reconciliation Updated: Yes    REASONS TO CALL YOUR OBSTETRICIAN:  1.   Persistent fever or shaking chills (Temperature higher than 100.4)  2.   Heavy bleeding (soaking more than 1 pad per hour); Passing clots  3.   Foul odor from vagina  4.   Mastitis (Breast infection; breast pain, chills, fever, redness)  5.   Urinary pain, burning or frequency  6.   Episiotomy infection  7.   Abdominal incision infection  8.   Severe depression longer than 24 hours    HAND WASHING  · Prior to handling the baby.  · Before breastfeeding or bottle feeding baby.  · After using the bathroom or changing the baby's diaper.    WOUND CARE  Ask your physician for additional care instructions.  In general:    ·  Incision:      · Keep clean and dry.    · Do NOT lift anything heavier than your baby for up to 6 weeks.    · There should not be any opening or pus.      VAGINAL CARE  · Nothing inside vagina for 6 weeks: no sexual intercourse, tampons or douching.  · Bleeding may continue for 2-4 weeks.  Amount may vary.    · Call your physician for heavy bleeding which means soaking more than 1 pad per hour    BIRTH CONTROL  · It is possible to become pregnant at any time after delivery and while breastfeeding.  · Plan to discuss a method of  "birth control with your physician at your follow up visit. visit.    DIET AND ELIMINATION  · Eating more fiber (bran cereal, fruits, and vegetables) and drinking plenty of fluids will help to avoid constipation.  · Urinary frequency after childbirth is normal.    POSTPARTUM BLUES  During the first few days after birth, you may experience a sense of the \"blues\" which may include impatience, irritability or even crying.  These feeling come and go quickly.  However, as many as 1 in 10 women experience emotional symptoms known as postpartum depression.    Postpartum depression:  May start as early as the second or third day after delivery or take several weeks or months to develop.  Symptoms of \"blues\" are present, but are more intense:  Crying spells; loss of appetite; feelings of hopelessness or loss of control; fear of touching the baby; over concern or no concern at all about the baby; little or no concern about your own appearance/caring for yourself; and/or inability to sleep or excessive sleeping.  Contact your physician if you are experiencing any of these symptoms.    Crisis Hotline:  · [place] Crisis Hotline:  2-006-LAQFVLR  Or 1-935.966.8738  · [place] Crisis Hotline:  1-783.248.6345  Or 068-703-0189    PREVENTING SHAKEN BABY:  If you are angry or stressed, PUT THE BABY IN THE CRIB, step away, take some deep breaths, and wait until you are calm to care for the baby.  DO NOT SHAKE THE BABY.  You are not alone, call a supporter for help.    · Crisis Call Center 24/7 crisis line 003-787-3682 or 1-735.344.3664  · You can also text them, text \"ANSWER\" to 493004    QUIT SMOKING/TOBACCO USE:  I understand the use of any tobacco products increases my chance of suffering from future heart disease and could cause other illnesses which may shorten my life. Quitting the use of tobacco products is the single most important thing I can do to improve my health. For further information on smoking / tobacco cessation call a " Toll Free Quit Line at 1-108.101.1209 (*[employer] [place]) or 1-774.757.8206 (American Lung Association) or you can access the web based program at www.lungusa.org.    · [place] Tobacco Users Help Line:  (678) 296-8514       Toll Free: 1-606.828.2299  · Quit Tobacco Program [facility] (056)642-7068    DEPRESSION / SUICIDE RISK:  As you are discharged from this [facility], it is important to learn how to keep safe from harming yourself.    Recognize the warning signs:  · Abrupt changes in personality, positive or negative- including increase in energy   · Giving away possessions  · Change in eating patterns- significant weight changes-  positive or negative  · Change in sleeping patterns- unable to sleep or sleeping all the time   · Unwillingness or inability to communicate  · Depression  · Unusual sadness, discouragement and loneliness  · Talk of wanting to die  · Neglect of personal appearance   · Rebelliousness- reckless behavior  · Withdrawal from people/activities they love  · Confusion- inability to concentrate     If you or a loved one observes any of these behaviors or has concerns about self-harm, here's what you can do:  · Talk about it- your feelings and reasons for harming yourself  · Remove any means that you might use to hurt yourself (examples: pills, rope, extension cords, firearm)  · Get professional help from the community (Mental Health, Substance Abuse, psychological counseling)  · Do not be alone:Call your Safe Contact- someone whom you trust who will be there for you.  · Call your local CRISIS HOTLINE 753-6964 or 907-950-7135  · Call your local Children's Mobile Crisis Response Team [place] (846) 069-3773 or www.[employer]  · Call the toll free National Suicide Prevention Hotlines   · National Suicide Prevention Lifeline 595-805-IMMJ (1683)  · National Hope Line Network 800-SUICIDE (469-1748)    DISCHARGE SURVEY:  Thank you for choosing  [facility].  We hope we provided you with very good care.  You may be receiving a survey in the mail.  Please fill it out.  Your opinion is valuable to us.    ADDITIONAL EDUCATIONAL MATERIALS GIVEN TO PATIENT:        My signature on this form indicates that:  1.  I have reviewed and understand the above information  2.  My questions regarding this information have been answered to my satisfaction.  3.  I have formulated a plan with my discharge nurse to obtain my prescribed medication for home.

## 2020-08-19 LAB
HSV1 DNA CSF QL NAA+PROBE: NOT DETECTED
HSV2 DNA CSF QL NAA+PROBE: NOT DETECTED
SPECIMEN SOURCE: NORMAL

## 2020-08-28 ENCOUNTER — POST PARTUM (OUTPATIENT)
Dept: OBGYN | Facility: CLINIC | Age: 20
End: 2020-08-28
Payer: MEDICAID

## 2020-08-28 VITALS — BODY MASS INDEX: 39.29 KG/M2 | WEIGHT: 228.9 LBS | SYSTOLIC BLOOD PRESSURE: 112 MMHG | DIASTOLIC BLOOD PRESSURE: 72 MMHG

## 2020-08-28 PROCEDURE — 99024 POSTOP FOLLOW-UP VISIT: CPT | Performed by: OBSTETRICS & GYNECOLOGY

## 2020-08-28 ASSESSMENT — EDINBURGH POSTNATAL DEPRESSION SCALE (EPDS)
I HAVE LOOKED FORWARD WITH ENJOYMENT TO THINGS: AS MUCH AS I EVER DID
I HAVE FELT SCARED OR PANICKY FOR NO GOOD REASON: NO, NOT AT ALL
I HAVE BLAMED MYSELF UNNECESSARILY WHEN THINGS WENT WRONG: NO, NEVER
I HAVE BEEN ABLE TO LAUGH AND SEE THE FUNNY SIDE OF THINGS: AS MUCH AS I ALWAYS COULD
THE THOUGHT OF HARMING MYSELF HAS OCCURRED TO ME: NEVER
I HAVE BEEN SO UNHAPPY THAT I HAVE HAD DIFFICULTY SLEEPING: NOT AT ALL
THINGS HAVE BEEN GETTING ON TOP OF ME: NO, I HAVE BEEN COPING AS WELL AS EVER
TOTAL SCORE: 0
I HAVE BEEN ANXIOUS OR WORRIED FOR NO GOOD REASON: NO, NOT AT ALL
I HAVE BEEN SO UNHAPPY THAT I HAVE BEEN CRYING: NO, NEVER
I HAVE FELT SAD OR MISERABLE: NO, NOT AT ALL

## 2020-08-28 NOTE — PROGRESS NOTES
Incision Check    CC: s/p c/s incision check    HPI: 20 y.o.  s/p  delivery on  with Dr. Murcia for scheduled repeat here for incision check.   Pt reports doing well with minimal pain - not taking narcotics.  No fevers.  Denies trouble with urination or BM.  Minimal vaginal bleeding.  Stopping BFing,      Denies concerns about mood.   EDPDS:0    /72   Wt 103.8 kg (228 lb 14.4 oz)   LMP 11/15/2019 (Exact Date)   BMI 39.29 kg/m²   Gen: AAO, NAD  Abd: soft, NT, ND, incision C/D/I, healing well    A/P: 20 y.o.  s/p c/s on  doing well  - no signs of postop complications  - no signs of PP depression  - contraception: considering pills to restart at PP; stopping BFIng.      RTC for routine postpartum visit in approx 3-4wks    Diana Recinos MD  [facility], Women's Health

## 2020-08-28 NOTE — PROGRESS NOTES
Pt. here for C/S check delivered on 8/14/2020  Currently : Bottle feeding  Pt. States having some cramping and a little pain on her right side  Post partum visit on will schedule before she leaves today  Chaperone offered not indicated

## 2020-08-31 NOTE — PROGRESS NOTES
OB follow up / Preo op visit  C Section scheduled for 8/14/2020  + fetal movement.  No VB, LOF or UC's.  Phone # [phone]  Preferred pharmacy confirmed.  GBS negative      
S: Pt presents for routine OB follow up.  No contractions, vaginal bleeding, or leaking fluids. Good fetal movement.    Questions answered.    O: /58   Wt 114.8 kg (253 lb)   LMP 11/15/2019 (Exact Date)   BMI 44.82 kg/m²   Patients' weight gain, fluid intake and exercise level discussed.  Vitals, fundal height , fetal position, and FHR reviewed on flowsheet    Lab:  Recent Results (from the past 336 hour(s))   SPECIMEN FORWARDED    Collection Time: 20  6:54 PM   Result Value Ref Range    Forwarded to Lab: [facility]     Forward Reason: Insurance     Specimen Sent: Swab        A/P:  20 y.o.  at 36w5d presents for routine obstetric follow-up.  Size equals dates and/or scan  Prev C/S x 2 , no BTL , desires LARC   Preop visit done today  I discussed w/ pt risks of surgery including pain, bleeding, infection, risk of damage to intraabdominal structures including bowel/bladder/ureters.  Pt confirms she is accepting of blood transfusion in case of emergency.  All questions answered.   No BTL, pt is wavering in decision and being swayed by the pain in pregnancy.  Discussed tubal regret syndrome and do not recommend the patient proceeding with tubal ligation at this time as she is not 100% sure about the decision.  F/u in 1 week     
Spine appears normal, range of motion is not limited, no muscle or joint tenderness

## 2020-09-14 ENCOUNTER — POST PARTUM (OUTPATIENT)
Dept: OBGYN | Facility: CLINIC | Age: 20
End: 2020-09-14
Payer: MEDICAID

## 2020-09-14 VITALS — SYSTOLIC BLOOD PRESSURE: 102 MMHG | WEIGHT: 233 LBS | DIASTOLIC BLOOD PRESSURE: 58 MMHG | BODY MASS INDEX: 39.99 KG/M2

## 2020-09-14 DIAGNOSIS — Z32.02 PREGNANCY EXAMINATION OR TEST, NEGATIVE RESULT: ICD-10-CM

## 2020-09-14 PROBLEM — O34.219 HISTORY OF CESAREAN DELIVERY, CURRENTLY PREGNANT: Status: RESOLVED | Noted: 2017-09-08 | Resolved: 2020-09-14

## 2020-09-14 PROBLEM — Z28.39 RUBELLA NON-IMMUNE STATUS, ANTEPARTUM: Status: RESOLVED | Noted: 2020-05-15 | Resolved: 2020-09-14

## 2020-09-14 PROBLEM — O09.899 RUBELLA NON-IMMUNE STATUS, ANTEPARTUM: Status: RESOLVED | Noted: 2020-05-15 | Resolved: 2020-09-14

## 2020-09-14 LAB
INT CON NEG: NEGATIVE
INT CON POS: POSITIVE
POC URINE PREGNANCY TEST: NEGATIVE

## 2020-09-14 PROCEDURE — 81025 URINE PREGNANCY TEST: CPT | Performed by: ADVANCED PRACTICE MIDWIFE

## 2020-09-14 PROCEDURE — 90050 PR POSTPARTUM VISIT: CPT | Performed by: ADVANCED PRACTICE MIDWIFE

## 2020-09-14 ASSESSMENT — EDINBURGH POSTNATAL DEPRESSION SCALE (EPDS)
I HAVE FELT SAD OR MISERABLE: NO, NOT AT ALL
I HAVE BEEN ABLE TO LAUGH AND SEE THE FUNNY SIDE OF THINGS: AS MUCH AS I ALWAYS COULD
THINGS HAVE BEEN GETTING ON TOP OF ME: NO, I HAVE BEEN COPING AS WELL AS EVER
I HAVE BLAMED MYSELF UNNECESSARILY WHEN THINGS WENT WRONG: NO, NEVER
I HAVE BEEN SO UNHAPPY THAT I HAVE BEEN CRYING: NO, NEVER
TOTAL SCORE: 0
I HAVE BEEN ANXIOUS OR WORRIED FOR NO GOOD REASON: NO, NOT AT ALL
I HAVE FELT SCARED OR PANICKY FOR NO GOOD REASON: NO, NOT AT ALL
I HAVE LOOKED FORWARD WITH ENJOYMENT TO THINGS: AS MUCH AS I EVER DID
THE THOUGHT OF HARMING MYSELF HAS OCCURRED TO ME: NEVER
I HAVE BEEN SO UNHAPPY THAT I HAVE HAD DIFFICULTY SLEEPING: NOT AT ALL

## 2020-09-14 NOTE — PROGRESS NOTES
"Pt here today for postpartum exam.  Operation Date: repeat C/Section on  08/14/2020  Formula feeding only  BCM: pill or depo-provera   LMP: not yet  Negative UPT today, done in clinic  WT: 233 lb  BP: 102/58  Pt states she feels occasional \"pinching\" on right side of the incision. States no other complaints or concerns today.  EPDS Score: (0)  Good ph: [phone]      "

## 2020-09-14 NOTE — PROGRESS NOTES
Subjective   Subjective:    Joelle Goldberg is a 20 y.o. female who presents for her postpartum exam 4 weeks following RLTCS. Her prenatal course was uncomplicated.  Her delivery was uncomplicated. Her method of feeding infant is breast and bottle. She desires Depo or pills for her birth control method. Reports no sex prior to this appointment. Patient denies crying spells, irritability, or mood swings.     Pap WNL on 2020    Eating a regular diet without difficulty.   Bowel movement are Normal.      Breast problems; feels like the breasts have shrunk  Dysuria no  Vaginal bleeding no  Vaginal itching no      Problem List     Patient Active Problem List    Diagnosis Date Noted   • Postpartum care following  delivery 2020   • Anemia affecting pregnancy in third trimester 2020   • Rubella non-immune status, antepartum 05/15/2020   • History of  delivery, currently pregnant; desires repeat NO BTL 2017       Objective    EDPS 0      Lab:  Recent Results (from the past 1008 hour(s))   COVID/SARS CoV-2 PCR    Collection Time: 20  3:30 PM    Specimen: Nasopharyngeal; Respirate   Result Value Ref Range    COVID Order Status Received    SARS-CoV-2, PCR (In-House)    Collection Time: 20  3:30 PM   Result Value Ref Range    SARS-CoV-2 Source NP Swab     SARS-CoV-2 by PCR NotDetected    CBC WITH DIFFERENTIAL    Collection Time: 20 12:40 PM   Result Value Ref Range    WBC 9.6 4.8 - 10.8 K/uL    RBC 4.64 4.20 - 5.40 M/uL    Hemoglobin 10.9 (L) 12.0 - 16.0 g/dL    Hematocrit 33.6 (L) 37.0 - 47.0 %    MCV 72.4 (L) 81.4 - 97.8 fL    MCH 23.5 (L) 27.0 - 33.0 pg    MCHC 32.4 (L) 33.6 - 35.0 g/dL    RDW 42.1 35.9 - 50.0 fL    Platelet Count 257 164 - 446 K/uL    MPV 9.8 9.0 - 12.9 fL    Neutrophils-Polys 57.60 44.00 - 72.00 %    Lymphocytes 31.60 22.00 - 41.00 %    Monocytes 9.90 0.00 - 13.40 %    Eosinophils 0.20 0.00 - 6.90 %    Basophils 0.30 0.00 - 1.80 %    Immature  Granulocytes 0.40 0.00 - 0.90 %    Nucleated RBC 0.00 /100 WBC    Neutrophils (Absolute) 5.53 2.00 - 7.15 K/uL    Lymphs (Absolute) 3.03 1.00 - 4.80 K/uL    Monos (Absolute) 0.95 (H) 0.00 - 0.85 K/uL    Eos (Absolute) 0.02 0.00 - 0.51 K/uL    Baso (Absolute) 0.03 0.00 - 0.12 K/uL    Immature Granulocytes (abs) 0.04 0.00 - 0.11 K/uL    NRBC (Absolute) 0.00 K/uL   HOLD BLOOD BANK SPECIMEN (NOT TESTED)    Collection Time: 08/14/20 12:40 PM   Result Value Ref Range    Holding Tube - Bb DONE    HSV-1 AND HSV-2 SUBTYPE, PCR    Collection Time: 08/14/20  3:25 PM   Result Value Ref Range    HSV Subtype Source Back Vesicle     HSV 1 Subtype by PCR Not Detected     HSV 2 Subtype by PCR Not Detected    CBC without differential    Collection Time: 08/14/20 11:12 PM   Result Value Ref Range    WBC 14.2 (H) 4.8 - 10.8 K/uL    RBC 4.21 4.20 - 5.40 M/uL    Hemoglobin 9.5 (L) 12.0 - 16.0 g/dL    Hematocrit 31.0 (L) 37.0 - 47.0 %    MCV 73.6 (L) 81.4 - 97.8 fL    MCH 22.6 (L) 27.0 - 33.0 pg    MCHC 30.6 (L) 33.6 - 35.0 g/dL    RDW 43.7 35.9 - 50.0 fL    Platelet Count 250 164 - 446 K/uL    MPV 10.1 9.0 - 12.9 fL   POCT Pregnancy    Collection Time: 09/14/20  9:22 AM   Result Value Ref Range    POC Urine Pregnancy Test NEGATIVE Negative    Internal Control Positive Positive     Internal Control Negative Negative      Vitals:    09/14/20 0908   BP: 102/58   Weight: 105.7 kg (233 lb)     Vitals:    09/14/20 0908   BP: 102/58     Objective    Well nourished female in no acute distress  A& O x 3  Heart RRR  LCTAB  No edema noted and pulses WNL bilaterally in lower extremities; no claudication  BS x 4; no guarding or tenderness  Breasts: no erythema or discharge. No masses or tenderness.     Genitourinary: Pelvic exam was performed with patient supine. External genitalia with no abnormal pigmentation, labial fusion, rash, tenderness, lesion or injury to the labia bilaterally. Vagina is moist with no lesions, foul discharge, erythema,  tenderness or bleeding. Mild discomfort with manipulation of introitus. Epis/laceration n/a.       Assessment   Assessment:    1. Postpartum Exam  2. General Counseling and Advice on Contraception  3. Screening for Postpartum Depression    Plan   Plan:    1. Breastfeeding support- discussed pacifier with patient and she will trial this as infant feeding pattern is not consistent with cluster feeding. Likely will need to learn self soothing.   2. Continue PNV   3. Contraceptive counseling- Discussed depo provera. She will return to clinic for this as it is unavailable at current.   4. Discussed diet, exercise and resumption of sexual activity.  Discussed signs and symptoms of stress incontinence and reviewed pelvic floor exercises.    5. Follow up ASAP.

## 2020-09-16 ENCOUNTER — NON-PROVIDER VISIT (OUTPATIENT)
Dept: OBGYN | Facility: CLINIC | Age: 20
End: 2020-09-16
Payer: MEDICAID

## 2020-09-16 DIAGNOSIS — Z30.42 ENCOUNTER FOR DEPO-PROVERA CONTRACEPTION: Primary | ICD-10-CM

## 2020-09-16 LAB
INT CON NEG: NEGATIVE
INT CON POS: POSITIVE
POC URINE PREGNANCY TEST: NEGATIVE

## 2020-09-16 PROCEDURE — 81025 URINE PREGNANCY TEST: CPT | Performed by: NURSE PRACTITIONER

## 2020-09-16 PROCEDURE — 96372 THER/PROPH/DIAG INJ SC/IM: CPT | Performed by: NURSE PRACTITIONER

## 2020-09-16 RX ORDER — MEDROXYPROGESTERONE ACETATE 150 MG/ML
150 INJECTION, SUSPENSION INTRAMUSCULAR
OUTPATIENT
Start: 2020-09-16 | End: 2021-09-11

## 2020-09-16 RX ADMIN — MEDROXYPROGESTERONE ACETATE 150 MG: 150 INJECTION, SUSPENSION INTRAMUSCULAR at 14:36

## 2020-09-16 NOTE — PROGRESS NOTES
Pt here for Nurse visit for Depo-provera injection. Consent signed  LMP: none yet. Had a delivery on 2020  Negative UPT today, done in clinic  Good # [phone]    Depo-Provera injection administered today 2020  NDC: 8582-2164-21  LOT#: [id number]  Expiration Date: 2022  Dose: 150 mg  Site: Left Deltoid  Patient educated on use and side effects of medication. Name and  verified prior to injection. Pt tolerated? Yes   Verified by Tracey Smith C.N.M.  Administered by Harsha Lopez Ass't at 2:36 PM.  Patient Provided Medication: No    Next Depo-provera injection will be due: Dec 2 - Dec 16 ().  Pt given reminder card.     Pt informed to have abstinence for 1 week or use condoms as back protection for 1 week. Pt verbalized understand, and has no further questions.

## 2020-12-07 ENCOUNTER — NON-PROVIDER VISIT (OUTPATIENT)
Dept: OBGYN | Facility: CLINIC | Age: 20
End: 2020-12-07
Payer: MEDICAID

## 2020-12-07 RX ADMIN — MEDROXYPROGESTERONE ACETATE 150 MG: 150 INJECTION, SUSPENSION INTRAMUSCULAR at 08:17

## 2021-02-22 ENCOUNTER — NON-PROVIDER VISIT (OUTPATIENT)
Dept: OBGYN | Facility: CLINIC | Age: 21
End: 2021-02-22
Payer: MEDICAID

## 2021-02-22 DIAGNOSIS — Z30.42 ENCOUNTER FOR DEPO-PROVERA CONTRACEPTION: ICD-10-CM

## 2021-02-22 PROCEDURE — 96372 THER/PROPH/DIAG INJ SC/IM: CPT | Performed by: NURSE PRACTITIONER

## 2021-02-22 RX ORDER — MEDROXYPROGESTERONE ACETATE 150 MG/ML
150 INJECTION, SUSPENSION INTRAMUSCULAR ONCE
Status: COMPLETED | OUTPATIENT
Start: 2021-02-22 | End: 2021-02-22

## 2021-02-22 RX ADMIN — MEDROXYPROGESTERONE ACETATE 150 MG: 150 INJECTION, SUSPENSION INTRAMUSCULAR at 09:32

## 2021-05-06 ENCOUNTER — TELEPHONE (OUTPATIENT)
Dept: OBGYN | Facility: CLINIC | Age: 21
End: 2021-05-06

## 2021-05-06 NOTE — TELEPHONE ENCOUNTER
Patient called because she started bleeding while on Depo advised pt that it could be her period. Advised pt that if she soaking thru a pad within the hour she should need to be evaluated at the ED. Pt understood transferred to [facility] so patient can schedule next Depo injection.

## 2021-05-17 ENCOUNTER — NON-PROVIDER VISIT (OUTPATIENT)
Dept: OBGYN | Facility: CLINIC | Age: 21
End: 2021-05-17
Payer: MEDICAID

## 2021-05-17 VITALS — SYSTOLIC BLOOD PRESSURE: 118 MMHG | BODY MASS INDEX: 41.37 KG/M2 | DIASTOLIC BLOOD PRESSURE: 72 MMHG | WEIGHT: 241 LBS

## 2021-05-17 DIAGNOSIS — Z30.013 ENCOUNTER FOR INITIAL PRESCRIPTION OF INJECTABLE CONTRACEPTIVE: ICD-10-CM

## 2021-05-17 RX ORDER — MEDROXYPROGESTERONE ACETATE 150 MG/ML
150 INJECTION, SUSPENSION INTRAMUSCULAR ONCE
OUTPATIENT
Start: 2021-05-17 | End: 2021-05-18

## 2021-05-17 NOTE — NON-PROVIDER
Pt here for Depo Provera   Last injection 21  Next injection due 21-21    Verified by KT    NDC: 9137-1914-25  LOT#: [id number]  Expiration Date: 23    Dose: 150 mg  Site: Left Arm    Patient educated on use and side effects of medication. Name and  verified prior to injection. Pt tolerated? well     Verified by KT    Administered by Harsha Garcia Ass't at 2:12 PM.    Patient Provided Medication: NO

## 2022-06-23 ENCOUNTER — APPOINTMENT (OUTPATIENT)
Dept: RADIOLOGY | Facility: MEDICAL CENTER | Age: 22
End: 2022-06-23
Attending: EMERGENCY MEDICINE
Payer: MEDICAID

## 2022-06-23 ENCOUNTER — HOSPITAL ENCOUNTER (EMERGENCY)
Facility: MEDICAL CENTER | Age: 22
End: 2022-06-23
Attending: EMERGENCY MEDICINE
Payer: MEDICAID

## 2022-06-23 VITALS
SYSTOLIC BLOOD PRESSURE: 116 MMHG | BODY MASS INDEX: 34.31 KG/M2 | HEIGHT: 65 IN | WEIGHT: 205.91 LBS | HEART RATE: 81 BPM | OXYGEN SATURATION: 98 % | RESPIRATION RATE: 16 BRPM | TEMPERATURE: 97 F | DIASTOLIC BLOOD PRESSURE: 78 MMHG

## 2022-06-23 DIAGNOSIS — N39.0 ACUTE UTI: ICD-10-CM

## 2022-06-23 DIAGNOSIS — J11.1 INFLUENZA: ICD-10-CM

## 2022-06-23 LAB
APPEARANCE UR: ABNORMAL
BACTERIA #/AREA URNS HPF: ABNORMAL /HPF
BILIRUB UR QL STRIP.AUTO: NEGATIVE
COLOR UR: ABNORMAL
EKG IMPRESSION: NORMAL
EPI CELLS #/AREA URNS HPF: ABNORMAL /HPF
FLUAV RNA SPEC QL NAA+PROBE: POSITIVE
FLUBV RNA SPEC QL NAA+PROBE: NEGATIVE
GLUCOSE UR STRIP.AUTO-MCNC: NEGATIVE MG/DL
KETONES UR STRIP.AUTO-MCNC: ABNORMAL MG/DL
LEUKOCYTE ESTERASE UR QL STRIP.AUTO: NEGATIVE
MICRO URNS: ABNORMAL
NITRITE UR QL STRIP.AUTO: NEGATIVE
PH UR STRIP.AUTO: 6 [PH] (ref 5–8)
PROT UR QL STRIP: 30 MG/DL
RBC # URNS HPF: ABNORMAL /HPF
RBC UR QL AUTO: NEGATIVE
RSV RNA SPEC QL NAA+PROBE: NEGATIVE
SARS-COV-2 RNA RESP QL NAA+PROBE: NOTDETECTED
SP GR UR STRIP.AUTO: 1.03
SPECIMEN SOURCE: ABNORMAL
UROBILINOGEN UR STRIP.AUTO-MCNC: 1 MG/DL
WBC #/AREA URNS HPF: ABNORMAL /HPF

## 2022-06-23 PROCEDURE — 81001 URINALYSIS AUTO W/SCOPE: CPT

## 2022-06-23 PROCEDURE — 87086 URINE CULTURE/COLONY COUNT: CPT

## 2022-06-23 PROCEDURE — 93005 ELECTROCARDIOGRAM TRACING: CPT | Performed by: EMERGENCY MEDICINE

## 2022-06-23 PROCEDURE — 71045 X-RAY EXAM CHEST 1 VIEW: CPT

## 2022-06-23 PROCEDURE — 93005 ELECTROCARDIOGRAM TRACING: CPT

## 2022-06-23 PROCEDURE — 99283 EMERGENCY DEPT VISIT LOW MDM: CPT

## 2022-06-23 PROCEDURE — A9270 NON-COVERED ITEM OR SERVICE: HCPCS | Performed by: EMERGENCY MEDICINE

## 2022-06-23 PROCEDURE — C9803 HOPD COVID-19 SPEC COLLECT: HCPCS | Performed by: EMERGENCY MEDICINE

## 2022-06-23 PROCEDURE — 0241U HCHG SARS-COV-2 COVID-19 NFCT DS RESP RNA 4 TRGT MIC: CPT

## 2022-06-23 PROCEDURE — 700102 HCHG RX REV CODE 250 W/ 637 OVERRIDE(OP): Performed by: EMERGENCY MEDICINE

## 2022-06-23 RX ORDER — OSELTAMIVIR PHOSPHATE 75 MG/1
75 CAPSULE ORAL ONCE
Status: COMPLETED | OUTPATIENT
Start: 2022-06-23 | End: 2022-06-23

## 2022-06-23 RX ORDER — CEPHALEXIN 500 MG/1
500 CAPSULE ORAL ONCE
Status: COMPLETED | OUTPATIENT
Start: 2022-06-23 | End: 2022-06-23

## 2022-06-23 RX ORDER — OSELTAMIVIR PHOSPHATE 75 MG/1
75 CAPSULE ORAL 2 TIMES DAILY
Qty: 10 CAPSULE | Refills: 0 | Status: SHIPPED | OUTPATIENT
Start: 2022-06-23 | End: 2022-12-08

## 2022-06-23 RX ORDER — CEPHALEXIN 500 MG/1
500 CAPSULE ORAL 3 TIMES DAILY
Qty: 15 CAPSULE | Refills: 0 | Status: SHIPPED | OUTPATIENT
Start: 2022-06-23 | End: 2022-06-28

## 2022-06-23 RX ADMIN — OSELTAMIVIR PHOSPHATE 75 MG: 75 CAPSULE ORAL at 21:15

## 2022-06-23 RX ADMIN — CEPHALEXIN 500 MG: 500 CAPSULE ORAL at 21:15

## 2022-06-24 NOTE — ED TRIAGE NOTES
Chief Complaint   Patient presents with   • Chest Pressure     Occurs with deep breathing and cough   • Congestion   • Cough       Started with cough and cold symptoms on Sunday, cough has continued and gotten worse per pt causing her chest to hurt, pt took home covid test that was yesterday, no fever or other symptoms noted

## 2022-06-24 NOTE — ED PROVIDER NOTES
"ED Provider Note    CHIEF COMPLAINT  Chief Complaint   Patient presents with   • Chest Pressure     Occurs with deep breathing and cough   • Congestion   • Cough       HPI  Joelle Goldberg is a 22 y.o. female who presents with a chief complaint of nasal congestion, tactile fever, cough productive of green sputum, fatigue, and generalized malaise that has been ongoing and worsening since Sunday, 5 days ago.  She has tried TheraFlu without improvement.  Her children now have a cough as well.  She has been vaccinated against both COVID-19 and influenza.    REVIEW OF SYSTEMS  See HPI for further details.  Nasal congestion.  Cough.  Fatigue.  Generalized malaise.  Tactile fever.  All other systems are negative.     PAST MEDICAL HISTORY   has a past medical history of Anemia affecting pregnancy in third trimester (7/24/2020), Anxiety, and Pregnant.    SOCIAL HISTORY  Social History     Tobacco Use   • Smoking status: Never Smoker   • Smokeless tobacco: Never Used   Vaping Use   • Vaping Use: Never used   Substance and Sexual Activity   • Alcohol use: Not Currently   • Drug use: Not Currently   • Sexual activity: Not Currently     Partners: Male     Birth control/protection: Condom     Comment: Unplanned pregnancy       SURGICAL HISTORY   has a past surgical history that includes primary c section (3/17/2015); repeat c section (3/11/2018); and repeat c section (N/A, 8/14/2020).    CURRENT MEDICATIONS  Home Medications     Reviewed by Pavithra Ham R.N. (Registered Nurse) on 06/23/22 at 1706  Med List Status: Not Addressed   Medication Last Dose Status   docusate sodium 100 MG Cap  Active   ferrous sulfate 325 (65 Fe) MG tablet  Active   ibuprofen (MOTRIN) 800 MG Tab  Active   Prenatal MV-Min-Fe Fum-FA-DHA (PRENATAL 1 PO)  Active                ALLERGIES  No Known Allergies    PHYSICAL EXAM  VITAL SIGNS: /80   Pulse 85   Temp 35.8 °C (96.5 °F) (Temporal)   Resp 14   Ht 1.651 m (5' 5\")   Wt 93.4 kg (205 " lb 14.6 oz)   LMP 06/06/2022   SpO2 96%   BMI 34.27 kg/m²    Pulse ox interpretation: I interpret this pulse ox as normal.  Constitutional: Alert in no apparent distress.  HENT: No signs of trauma, Bilateral external ears normal, Nose normal.  Moist mucous membranes.  Eyes: Pupils are equal and reactive, Conjunctiva normal, Non-icteric.   Neck: Normal range of motion, No tenderness, Supple, No stridor.   Lymphatic: No lymphadenopathy noted.   Cardiovascular: Regular rate and rhythm, no murmurs. Pulses symmetrical.  Thorax & Lungs: Normal breath sounds, No respiratory distress, No wheezing, No chest tenderness.   Abdomen: Bowel sounds normal, Soft, No tenderness, No masses, No pulsatile masses. No peritoneal signs.  Skin: Warm, Dry, No erythema, No rash.   Back: Normal alignment.  Extremities: No cyanosis.  Musculoskeletal: No major deformities noted.   Neurologic: Alert, No focal deficits noted.   Psychiatric: Affect normal, Judgment normal, Mood normal.     DIAGNOSTIC STUDIES / PROCEDURES    LABS  Results for orders placed or performed during the hospital encounter of 06/23/22   CoV-2, FLU A/B, and RSV by PCR (2-4 Hours CEPHEID) : Collect NP swab in VTM    Specimen: Respirate   Result Value Ref Range    Influenza virus A RNA POSITIVE (A) Negative    Influenza virus B, PCR Negative Negative    RSV, PCR Negative Negative    SARS-CoV-2 by PCR NotDetected     SARS-CoV-2 Source NP Swab    URINALYSIS    Specimen: Urine, Clean Catch   Result Value Ref Range    Color DK Yellow     Character Turbid (A)     Specific Gravity 1.033 <1.035    Ph 6.0 5.0 - 8.0    Glucose Negative Negative mg/dL    Ketones Trace (A) Negative mg/dL    Protein 30 (A) Negative mg/dL    Bilirubin Negative Negative    Urobilinogen, Urine 1.0 Negative    Nitrite Negative Negative    Leukocyte Esterase Negative Negative    Occult Blood Negative Negative    Micro Urine Req Microscopic    URINE MICROSCOPIC (W/UA)   Result Value Ref Range    WBC 0-2  /hpf    RBC 0-2 /hpf    Bacteria Moderate (A) None /hpf    Epithelial Cells Many (A) /hpf   EKG   Result Value Ref Range    Report       [facility] Emergency Dept.    Test Date:  2022  Pt Name:    CARL MEZA     Department: ER  MRN:        [id number]                      Room:  Gender:     Female                       Technician: [id number]  :        2000                   Requested By:ER TRIAGE PROTOCOL  Order #:    [id number]                    Reading MD: Ankit Pozo MD    Measurements  Intervals                                Axis  Rate:       89                           P:          36  WV:         168                          QRS:        56  QRSD:       80                           T:          8  QT:         340  QTc:        414    Interpretive Statements  SINUS RHYTHM  BASELINE WANDER IN LEAD(S) V2  Compared to ECG 2019 21:40:02  No significant changes  Electronically Signed On 2022 22:15:29 PDT by Ankit Pozo MD       RADIOLOGY  DX-CHEST-PORTABLE (1 VIEW)   Final Result      No radiographic evidence of acute cardiopulmonary process.        COURSE & MEDICAL DECISION MAKING  Pertinent Labs & Imaging studies reviewed. (See chart for details)  This is a 22-year-old female who is here with nasal congestion, tactile fever, cough, fatigue, and generalized malaise that has been ongoing for the past 5 days.  She arrives afebrile with normal vital signs.  She appears well-hydrated and nontoxic.  Physical exam is reassuring without rales or rhonchi on lung auscultation and no cardiac murmurs, rubs, or gallops.  She has no chest pain, most likely viral syndrome.  I was also notified by nursing staff that the patient reported some dysuria so a urinalysis was ordered.  Patient was reevaluated and does admit to some dysuria that has been ongoing for several days.  She has no hematuria, abdominal pain, or flank pain.    Chest x-ray thankfully did not demonstrate  concern for pneumonia.  Unfortunately, her viral swabs were positive for influenza and she was started on Tamiflu.    Urinalysis revealed trace ketones and moderate bacteria but many epithelial cells.  Although this is a contaminated specimen given the patient's report of dysuria and no concern for STI we will treat with a short course of Keflex and I did send a urine culture to be added on.    Patient's O2 sats are in the high 90s on room air.  She is breathing easily and not in any respiratory distress.  Her vital signs remain normal and stable.  She continues to be well-appearing.  She is safe for discharge with close outpatient management.  She was discharged in good and stable condition with very strict return precautions.    The patient will not drink alcohol nor drive with prescribed medications. The patient will return for worsening symptoms and is stable at the time of discharge. The patient verbalizes understanding and will comply.    FINAL IMPRESSION  1. Influenza  oseltamivir (TAMIFLU) 75 MG Cap   2. Acute UTI  cephALEXin (KEFLEX) 500 MG Cap         Electronically signed by: Ankit Pozo M.D., 6/23/2022 5:41 PM

## 2022-06-24 NOTE — DISCHARGE INSTRUCTIONS
You were seen in the ER for cough and cold symptoms and unfortunately you do have the flu.  I have given you a prescription for Tamiflu which is a medication that can help shorten the duration of the illness.  You also did have some bacteria in your urine although it was not a clean-catch meaning there were a lot of skin cells in the sample.  Despite this I am going to give you a prescription for antibiotics because you are complaining of some discomfort with urination.  Please take the medication as directed.  Drink at least 8 ounces of clear nonalcoholic liquids every hour to maintain your hydration.  You can take Tylenol and Motrin as directed on the bottle for discomfort.  Follow-up with your primary care physician this week for recheck and return with new or worsening symptoms.

## 2022-06-26 LAB
BACTERIA UR CULT: NORMAL
SIGNIFICANT IND 70042: NORMAL
SITE SITE: NORMAL
SOURCE SOURCE: NORMAL

## 2022-10-17 NOTE — ED AVS SNAPSHOT
[employer] Access Code: [email or web address]  Expires: 7/4/2017  2:04 PM    [employer]  A secure, online tool to manage your health information     [employer]’s [employer]® is a secure, online tool that connects you to your personalized health information from the privacy of your home -- day or night - making it very easy for you to manage your healthcare. Once the activation process is completed, you can even access your medical information using the [employer] zeb, which is available for free in the Apple Zeb store or Google Play store.     [employer] provides the following levels of access (as shown below):   My Chart Features   [facility] Primary Care Doctor [facility]  Specialists [facility]  Urgent  Care Non-[facility]  Primary Care  Doctor   Email your healthcare team securely and privately 24/7 X X X X   Manage appointments: schedule your next appointment; view details of past/upcoming appointments X      Request prescription refills. X      View recent personal medical records, including lab and immunizations X X X X   View health record, including health history, allergies, medications X X X X   Read reports about your outpatient visits, procedures, consult and ER notes X X X X   See your discharge summary, which is a recap of your hospital and/or ER visit that includes your diagnosis, lab results, and care plan. X X       How to register for [employer]:  1. Go to  [email or web address].  2. Click on the Sign Up Now box, which takes you to the New Member Sign Up page. You will need to provide the following information:  a. Enter your [employer] Access Code exactly as it appears at the top of this page. (You will not need to use this code after you’ve completed the sign-up process. If you do not sign up before the expiration date, you must request a new code.)   b. Enter your date of birth.   c. Enter your home email address.   d. Click Submit, and follow the next screen’s instructions.  3. Create a [employer] ID. This will be your [employer]  login ID and cannot be changed, so think of one that is secure and easy to remember.  4. Create a [employer] password. You can change your password at any time.  5. Enter your Password Reset Question and Answer. This can be used at a later time if you forget your password.   6. Enter your e-mail address. This allows you to receive e-mail notifications when new information is available in [employer].  7. Click Sign Up. You can now view your health information.    For assistance activating your [employer] account, call [phone]         cup/self fed

## 2022-12-08 ENCOUNTER — GYNECOLOGY VISIT (OUTPATIENT)
Dept: OBGYN | Facility: CLINIC | Age: 22
End: 2022-12-08
Payer: MEDICAID

## 2022-12-08 VITALS — WEIGHT: 221 LBS | BODY MASS INDEX: 36.78 KG/M2 | DIASTOLIC BLOOD PRESSURE: 68 MMHG | SYSTOLIC BLOOD PRESSURE: 114 MMHG

## 2022-12-08 DIAGNOSIS — Z32.01 POSITIVE PREGNANCY TEST: ICD-10-CM

## 2022-12-08 DIAGNOSIS — Z32.00 ENCOUNTER FOR CONFIRMATION OF PREGNANCY TEST RESULT WITH PHYSICAL EXAMINATION: ICD-10-CM

## 2022-12-08 PROCEDURE — 76830 TRANSVAGINAL US NON-OB: CPT | Performed by: ADVANCED PRACTICE MIDWIFE

## 2022-12-08 PROCEDURE — 99214 OFFICE O/P EST MOD 30 MIN: CPT | Mod: 25 | Performed by: ADVANCED PRACTICE MIDWIFE

## 2022-12-08 NOTE — PROGRESS NOTES
Pt here for DUB.    Pt states she is having period like cramping.   +adrianne DIAZ# [phone]  LMP: Unknown, 0w0d today.   Pharmacy confirmed

## 2022-12-08 NOTE — PROGRESS NOTES
Joelle Goldberg is a 22 y.o. female who presents for         HPI Comments: Pt presents for .  Patient's last menstrual period was 06/06/2022. She reports she is unsure of last menstral period. She was not tracking her period. This is an unplanned pregnancy as she was attempting to reconcile with her . She was on depo but reports that she had spotting and not full periods.      Patient reports getting a positive pregnancy test 2 weeks ago.     Review of Systems   Pertinent positives documented in HPI and all other systems reviewed & are negative      Past Medical History:   Diagnosis Date    Anemia affecting pregnancy in third trimester 7/24/2020    Anxiety     Pregnant        Medications:   Current Outpatient Medications Ordered in Epic   Medication Sig Dispense Refill    Prenatal MV-Min-Fe Fum-FA-DHA (PRENATAL 1 PO) Take  by mouth.       No current Epic-ordered facility-administered medications on file.          Objective:   Vital measurements:  /68   Wt 221 lb   Body mass index is 36.78 kg/m². (Goal BM I>18 <25)    Physical Exam   Nursing note and vitals reviewed.  Constitutional: She is oriented to person, place, and time. She appears well-developed and well-nourished. No distress.     Genitourinary:  Uterus size of 24   No vaginal bleeding or discharge noted.     Musculoskeletal: Normal range of motion. She exhibits no edema and no tenderness.     Skin: Skin is warm and dry. No rash noted. She is not diaphoretic. No erythema. No pallor.     Psychiatric: She has a normal mood and affect. Her behavior is normal. Judgment and thought content normal.     Transabdominal Ultrasound  Indication:     Findings:  BPD 5.78 6.04 5.95   HC 21.76 22.34 21.88  AC 19.36 19.74 18.89  FL 4.20 4.41 4.20    Unstable lie    FHR: 136 bpm      Impression: Single intrauterine pregnancy measuring 24 weeks    Per ACOG dating guidelines, final SAI is 03/30/2023 based on US today.     Ultrasound performed  and read by myself.              Assessment:     1. Encounter for confirmation of pregnancy test result with physical examination        2. Positive pregnancy test            Plan:   1. Discussed importance of prenatal vitamins with patient. Encouraged daily use.  2. Reviewed likely conception window with patient of first week of July. She is desiring BTL and will sign consent at upcoming appointment. Did briefly discuss risks of 4th  section per patient request.   3. Follow up in 1-2 weeks for NOB visit.

## 2022-12-10 ENCOUNTER — APPOINTMENT (OUTPATIENT)
Dept: RADIOLOGY | Facility: MEDICAL CENTER | Age: 22
End: 2022-12-10
Attending: ADVANCED PRACTICE MIDWIFE
Payer: MEDICAID

## 2022-12-10 ENCOUNTER — HOSPITAL ENCOUNTER (OUTPATIENT)
Facility: MEDICAL CENTER | Age: 22
End: 2022-12-12
Attending: OBSTETRICS & GYNECOLOGY | Admitting: OBSTETRICS & GYNECOLOGY
Payer: MEDICAID

## 2022-12-10 DIAGNOSIS — K80.20 CHOLECYSTOLITHIASIS AFFECTING PREGNANCY IN SECOND TRIMESTER: ICD-10-CM

## 2022-12-10 DIAGNOSIS — O99.612 CHOLECYSTOLITHIASIS AFFECTING PREGNANCY IN SECOND TRIMESTER: ICD-10-CM

## 2022-12-10 PROCEDURE — 99285 EMERGENCY DEPT VISIT HI MDM: CPT

## 2022-12-10 RX ORDER — MORPHINE SULFATE 4 MG/ML
4 INJECTION INTRAVENOUS
Status: DISCONTINUED | OUTPATIENT
Start: 2022-12-10 | End: 2022-12-10

## 2022-12-10 RX ORDER — MORPHINE SULFATE 4 MG/ML
4 INJECTION INTRAVENOUS
Status: DISCONTINUED | OUTPATIENT
Start: 2022-12-10 | End: 2022-12-11

## 2022-12-10 RX ORDER — ONDANSETRON 2 MG/ML
4 INJECTION INTRAMUSCULAR; INTRAVENOUS EVERY 4 HOURS PRN
Status: DISCONTINUED | OUTPATIENT
Start: 2022-12-10 | End: 2022-12-12 | Stop reason: HOSPADM

## 2022-12-10 ASSESSMENT — ENCOUNTER SYMPTOMS
NAUSEA: 1
EYES NEGATIVE: 1
NEUROLOGICAL NEGATIVE: 1
CONSTITUTIONAL NEGATIVE: 1
CONSTIPATION: 1
BACK PAIN: 1
RESPIRATORY NEGATIVE: 1
ABDOMINAL PAIN: 1
VOMITING: 1

## 2022-12-10 ASSESSMENT — PAIN DESCRIPTION - PAIN TYPE: TYPE: ACUTE PAIN

## 2022-12-10 ASSESSMENT — PAIN SCALES - GENERAL: PAINLEVEL: 10 - WORST POSSIBLE PAIN

## 2022-12-11 ENCOUNTER — ANESTHESIA EVENT (OUTPATIENT)
Dept: SURGERY | Facility: MEDICAL CENTER | Age: 22
End: 2022-12-11
Payer: MEDICAID

## 2022-12-11 ENCOUNTER — ANESTHESIA (OUTPATIENT)
Dept: SURGERY | Facility: MEDICAL CENTER | Age: 22
End: 2022-12-11
Payer: MEDICAID

## 2022-12-11 PROBLEM — O99.013 ANEMIA AFFECTING PREGNANCY IN THIRD TRIMESTER: Status: RESOLVED | Noted: 2020-07-24 | Resolved: 2022-12-11

## 2022-12-11 PROBLEM — Z3A.24 24 WEEKS GESTATION OF PREGNANCY: Status: ACTIVE | Noted: 2022-12-11

## 2022-12-11 PROBLEM — Z98.891 PREVIOUS CESAREAN SECTION: Status: ACTIVE | Noted: 2022-12-11

## 2022-12-11 PROBLEM — R10.11 RIGHT UPPER QUADRANT PAIN: Status: ACTIVE | Noted: 2022-12-11

## 2022-12-11 LAB
ABO GROUP BLD: NORMAL
ALBUMIN SERPL BCP-MCNC: 4 G/DL (ref 3.2–4.9)
ALBUMIN/GLOB SERPL: 0.9 G/DL
ALP SERPL-CCNC: 124 U/L (ref 30–99)
ALT SERPL-CCNC: 10 U/L (ref 2–50)
ANION GAP SERPL CALC-SCNC: 15 MMOL/L (ref 7–16)
AST SERPL-CCNC: 12 U/L (ref 12–45)
BASOPHILS # BLD AUTO: 0.2 % (ref 0–1.8)
BASOPHILS # BLD: 0.03 K/UL (ref 0–0.12)
BILIRUB SERPL-MCNC: 0.3 MG/DL (ref 0.1–1.5)
BLD GP AB SCN SERPL QL: NORMAL
BUN SERPL-MCNC: 8 MG/DL (ref 8–22)
CALCIUM SERPL-MCNC: 9.6 MG/DL (ref 8.5–10.5)
CHLORIDE SERPL-SCNC: 104 MMOL/L (ref 96–112)
CO2 SERPL-SCNC: 17 MMOL/L (ref 20–33)
CREAT SERPL-MCNC: 0.58 MG/DL (ref 0.5–1.4)
EOSINOPHIL # BLD AUTO: 0 K/UL (ref 0–0.51)
EOSINOPHIL NFR BLD: 0 % (ref 0–6.9)
ERYTHROCYTE [DISTWIDTH] IN BLOOD BY AUTOMATED COUNT: 39.8 FL (ref 35.9–50)
GFR SERPLBLD CREATININE-BSD FMLA CKD-EPI: 129 ML/MIN/1.73 M 2
GLOBULIN SER CALC-MCNC: 4.3 G/DL (ref 1.9–3.5)
GLUCOSE SERPL-MCNC: 104 MG/DL (ref 65–99)
HBV SURFACE AG SER QL: ABNORMAL
HCT VFR BLD AUTO: 36.4 % (ref 37–47)
HCV AB SER QL: ABNORMAL
HGB BLD-MCNC: 11.7 G/DL (ref 12–16)
HIV 1+2 AB+HIV1 P24 AG SERPL QL IA: NORMAL
IMM GRANULOCYTES # BLD AUTO: 0.09 K/UL (ref 0–0.11)
IMM GRANULOCYTES NFR BLD AUTO: 0.6 % (ref 0–0.9)
LIPASE SERPL-CCNC: 18 U/L (ref 11–82)
LYMPHOCYTES # BLD AUTO: 1.56 K/UL (ref 1–4.8)
LYMPHOCYTES NFR BLD: 10.4 % (ref 22–41)
MCH RBC QN AUTO: 23.9 PG (ref 27–33)
MCHC RBC AUTO-ENTMCNC: 32.1 G/DL (ref 33.6–35)
MCV RBC AUTO: 74.4 FL (ref 81.4–97.8)
MONOCYTES # BLD AUTO: 0.6 K/UL (ref 0–0.85)
MONOCYTES NFR BLD AUTO: 4 % (ref 0–13.4)
NEUTROPHILS # BLD AUTO: 12.7 K/UL (ref 2–7.15)
NEUTROPHILS NFR BLD: 84.8 % (ref 44–72)
NRBC # BLD AUTO: 0 K/UL
NRBC BLD-RTO: 0 /100 WBC
PLATELET # BLD AUTO: 357 K/UL (ref 164–446)
PMV BLD AUTO: 9.7 FL (ref 9–12.9)
POTASSIUM SERPL-SCNC: 3.8 MMOL/L (ref 3.6–5.5)
PROT SERPL-MCNC: 8.3 G/DL (ref 6–8.2)
RBC # BLD AUTO: 4.89 M/UL (ref 4.2–5.4)
RH BLD: NORMAL
RUBV AB SER QL: 33.9 IU/ML
SODIUM SERPL-SCNC: 136 MMOL/L (ref 135–145)
T PALLIDUM AB SER QL IA: ABNORMAL
WBC # BLD AUTO: 15 K/UL (ref 4.8–10.8)

## 2022-12-11 PROCEDURE — 86803 HEPATITIS C AB TEST: CPT

## 2022-12-11 PROCEDURE — 87389 HIV-1 AG W/HIV-1&-2 AB AG IA: CPT

## 2022-12-11 PROCEDURE — 700111 HCHG RX REV CODE 636 W/ 250 OVERRIDE (IP): Performed by: ADVANCED PRACTICE MIDWIFE

## 2022-12-11 PROCEDURE — A9270 NON-COVERED ITEM OR SERVICE: HCPCS | Performed by: ADVANCED PRACTICE MIDWIFE

## 2022-12-11 PROCEDURE — 86900 BLOOD TYPING SEROLOGIC ABO: CPT

## 2022-12-11 PROCEDURE — 700105 HCHG RX REV CODE 258: Performed by: ADVANCED PRACTICE MIDWIFE

## 2022-12-11 PROCEDURE — 99225 PR SUBSEQUENT OBSERVATION CARE,LEVEL II: CPT | Performed by: OBSTETRICS & GYNECOLOGY

## 2022-12-11 PROCEDURE — 96374 THER/PROPH/DIAG INJ IV PUSH: CPT

## 2022-12-11 PROCEDURE — 700102 HCHG RX REV CODE 250 W/ 637 OVERRIDE(OP): Performed by: OBSTETRICS & GYNECOLOGY

## 2022-12-11 PROCEDURE — 160002 HCHG RECOVERY MINUTES (STAT): Performed by: SURGERY

## 2022-12-11 PROCEDURE — 88304 TISSUE EXAM BY PATHOLOGIST: CPT

## 2022-12-11 PROCEDURE — 160048 HCHG OR STATISTICAL LEVEL 1-5: Performed by: SURGERY

## 2022-12-11 PROCEDURE — 96376 TX/PRO/DX INJ SAME DRUG ADON: CPT

## 2022-12-11 PROCEDURE — 700102 HCHG RX REV CODE 250 W/ 637 OVERRIDE(OP): Performed by: STUDENT IN AN ORGANIZED HEALTH CARE EDUCATION/TRAINING PROGRAM

## 2022-12-11 PROCEDURE — 700101 HCHG RX REV CODE 250: Performed by: STUDENT IN AN ORGANIZED HEALTH CARE EDUCATION/TRAINING PROGRAM

## 2022-12-11 PROCEDURE — 85025 COMPLETE CBC W/AUTO DIFF WBC: CPT

## 2022-12-11 PROCEDURE — 700101 HCHG RX REV CODE 250: Performed by: SURGERY

## 2022-12-11 PROCEDURE — 86850 RBC ANTIBODY SCREEN: CPT

## 2022-12-11 PROCEDURE — 36415 COLL VENOUS BLD VENIPUNCTURE: CPT

## 2022-12-11 PROCEDURE — 76705 ECHO EXAM OF ABDOMEN: CPT

## 2022-12-11 PROCEDURE — 700111 HCHG RX REV CODE 636 W/ 250 OVERRIDE (IP): Performed by: STUDENT IN AN ORGANIZED HEALTH CARE EDUCATION/TRAINING PROGRAM

## 2022-12-11 PROCEDURE — 86901 BLOOD TYPING SEROLOGIC RH(D): CPT

## 2022-12-11 PROCEDURE — 700102 HCHG RX REV CODE 250 W/ 637 OVERRIDE(OP): Performed by: ADVANCED PRACTICE MIDWIFE

## 2022-12-11 PROCEDURE — 86762 RUBELLA ANTIBODY: CPT

## 2022-12-11 PROCEDURE — 302790 HCHG STAT ANTEPARTUM CARE, DAILY

## 2022-12-11 PROCEDURE — 86592 SYPHILIS TEST NON-TREP QUAL: CPT

## 2022-12-11 PROCEDURE — 96375 TX/PRO/DX INJ NEW DRUG ADDON: CPT

## 2022-12-11 PROCEDURE — 160035 HCHG PACU - 1ST 60 MINS PHASE I: Performed by: SURGERY

## 2022-12-11 PROCEDURE — 160009 HCHG ANES TIME/MIN: Performed by: SURGERY

## 2022-12-11 PROCEDURE — 83690 ASSAY OF LIPASE: CPT

## 2022-12-11 PROCEDURE — 87340 HEPATITIS B SURFACE AG IA: CPT

## 2022-12-11 PROCEDURE — 160029 HCHG SURGERY MINUTES - 1ST 30 MINS LEVEL 4: Performed by: SURGERY

## 2022-12-11 PROCEDURE — 160036 HCHG PACU - EA ADDL 30 MINS PHASE I: Performed by: SURGERY

## 2022-12-11 PROCEDURE — 00790 ANES IPER UPR ABD NOS: CPT | Performed by: STUDENT IN AN ORGANIZED HEALTH CARE EDUCATION/TRAINING PROGRAM

## 2022-12-11 PROCEDURE — 99285 EMERGENCY DEPT VISIT HI MDM: CPT | Performed by: SURGERY

## 2022-12-11 PROCEDURE — 700105 HCHG RX REV CODE 258: Performed by: STUDENT IN AN ORGANIZED HEALTH CARE EDUCATION/TRAINING PROGRAM

## 2022-12-11 PROCEDURE — G0378 HOSPITAL OBSERVATION PER HR: HCPCS

## 2022-12-11 PROCEDURE — 99140 ANES COMP EMERGENCY COND: CPT | Performed by: STUDENT IN AN ORGANIZED HEALTH CARE EDUCATION/TRAINING PROGRAM

## 2022-12-11 PROCEDURE — A9270 NON-COVERED ITEM OR SERVICE: HCPCS | Performed by: OBSTETRICS & GYNECOLOGY

## 2022-12-11 PROCEDURE — 80053 COMPREHEN METABOLIC PANEL: CPT

## 2022-12-11 PROCEDURE — A9270 NON-COVERED ITEM OR SERVICE: HCPCS | Performed by: STUDENT IN AN ORGANIZED HEALTH CARE EDUCATION/TRAINING PROGRAM

## 2022-12-11 PROCEDURE — 86780 TREPONEMA PALLIDUM: CPT

## 2022-12-11 PROCEDURE — 160041 HCHG SURGERY MINUTES - EA ADDL 1 MIN LEVEL 4: Performed by: SURGERY

## 2022-12-11 RX ORDER — DOCUSATE SODIUM 100 MG/1
100 CAPSULE, LIQUID FILLED ORAL 2 TIMES DAILY
Status: DISCONTINUED | OUTPATIENT
Start: 2022-12-11 | End: 2022-12-12 | Stop reason: HOSPADM

## 2022-12-11 RX ORDER — OXYCODONE HCL 5 MG/5 ML
10 SOLUTION, ORAL ORAL
Status: COMPLETED | OUTPATIENT
Start: 2022-12-11 | End: 2022-12-11

## 2022-12-11 RX ORDER — HYDROMORPHONE HYDROCHLORIDE 2 MG/ML
INJECTION, SOLUTION INTRAMUSCULAR; INTRAVENOUS; SUBCUTANEOUS PRN
Status: DISCONTINUED | OUTPATIENT
Start: 2022-12-11 | End: 2022-12-11 | Stop reason: SURG

## 2022-12-11 RX ORDER — ACETAMINOPHEN 500 MG
1000 TABLET ORAL EVERY 6 HOURS
Status: DISCONTINUED | OUTPATIENT
Start: 2022-12-11 | End: 2022-12-12 | Stop reason: HOSPADM

## 2022-12-11 RX ORDER — HYDROCODONE BITARTRATE AND ACETAMINOPHEN 5; 325 MG/1; MG/1
1 TABLET ORAL EVERY 4 HOURS PRN
Status: DISCONTINUED | OUTPATIENT
Start: 2022-12-11 | End: 2022-12-12 | Stop reason: HOSPADM

## 2022-12-11 RX ORDER — SODIUM CHLORIDE, SODIUM LACTATE, POTASSIUM CHLORIDE, CALCIUM CHLORIDE 600; 310; 30; 20 MG/100ML; MG/100ML; MG/100ML; MG/100ML
INJECTION, SOLUTION INTRAVENOUS CONTINUOUS
Status: DISCONTINUED | OUTPATIENT
Start: 2022-12-11 | End: 2022-12-12

## 2022-12-11 RX ORDER — HYDRALAZINE HYDROCHLORIDE 20 MG/ML
5 INJECTION INTRAMUSCULAR; INTRAVENOUS
Status: DISCONTINUED | OUTPATIENT
Start: 2022-12-11 | End: 2022-12-11 | Stop reason: HOSPADM

## 2022-12-11 RX ORDER — ONDANSETRON 2 MG/ML
INJECTION INTRAMUSCULAR; INTRAVENOUS PRN
Status: DISCONTINUED | OUTPATIENT
Start: 2022-12-11 | End: 2022-12-11 | Stop reason: SURG

## 2022-12-11 RX ORDER — OXYCODONE HYDROCHLORIDE 5 MG/1
5-10 TABLET ORAL EVERY 4 HOURS PRN
Status: DISCONTINUED | OUTPATIENT
Start: 2022-12-11 | End: 2022-12-12 | Stop reason: HOSPADM

## 2022-12-11 RX ORDER — BUPIVACAINE HYDROCHLORIDE AND EPINEPHRINE 5; 5 MG/ML; UG/ML
INJECTION, SOLUTION EPIDURAL; INTRACAUDAL; PERINEURAL
Status: DISCONTINUED | OUTPATIENT
Start: 2022-12-11 | End: 2022-12-11 | Stop reason: HOSPADM

## 2022-12-11 RX ORDER — SODIUM CHLORIDE, SODIUM LACTATE, POTASSIUM CHLORIDE, CALCIUM CHLORIDE 600; 310; 30; 20 MG/100ML; MG/100ML; MG/100ML; MG/100ML
INJECTION, SOLUTION INTRAVENOUS
Status: DISCONTINUED | OUTPATIENT
Start: 2022-12-11 | End: 2022-12-11 | Stop reason: SURG

## 2022-12-11 RX ORDER — MORPHINE SULFATE 4 MG/ML
4 INJECTION INTRAVENOUS
Status: DISCONTINUED | OUTPATIENT
Start: 2022-12-11 | End: 2022-12-12 | Stop reason: HOSPADM

## 2022-12-11 RX ORDER — DEXAMETHASONE SODIUM PHOSPHATE 4 MG/ML
INJECTION, SOLUTION INTRA-ARTICULAR; INTRALESIONAL; INTRAMUSCULAR; INTRAVENOUS; SOFT TISSUE PRN
Status: DISCONTINUED | OUTPATIENT
Start: 2022-12-11 | End: 2022-12-11 | Stop reason: SURG

## 2022-12-11 RX ORDER — DIPHENHYDRAMINE HYDROCHLORIDE 50 MG/ML
12.5 INJECTION INTRAMUSCULAR; INTRAVENOUS
Status: DISCONTINUED | OUTPATIENT
Start: 2022-12-11 | End: 2022-12-11 | Stop reason: HOSPADM

## 2022-12-11 RX ORDER — HYDROMORPHONE HYDROCHLORIDE 1 MG/ML
0.2 INJECTION, SOLUTION INTRAMUSCULAR; INTRAVENOUS; SUBCUTANEOUS
Status: DISCONTINUED | OUTPATIENT
Start: 2022-12-11 | End: 2022-12-11 | Stop reason: HOSPADM

## 2022-12-11 RX ORDER — ROCURONIUM BROMIDE 10 MG/ML
INJECTION, SOLUTION INTRAVENOUS PRN
Status: DISCONTINUED | OUTPATIENT
Start: 2022-12-11 | End: 2022-12-11 | Stop reason: SURG

## 2022-12-11 RX ORDER — SODIUM CHLORIDE, SODIUM LACTATE, POTASSIUM CHLORIDE, CALCIUM CHLORIDE 600; 310; 30; 20 MG/100ML; MG/100ML; MG/100ML; MG/100ML
INJECTION, SOLUTION INTRAVENOUS CONTINUOUS
Status: DISCONTINUED | OUTPATIENT
Start: 2022-12-11 | End: 2022-12-11 | Stop reason: HOSPADM

## 2022-12-11 RX ORDER — OXYCODONE HCL 5 MG/5 ML
5 SOLUTION, ORAL ORAL
Status: COMPLETED | OUTPATIENT
Start: 2022-12-11 | End: 2022-12-11

## 2022-12-11 RX ORDER — HYDROMORPHONE HYDROCHLORIDE 1 MG/ML
0.5 INJECTION, SOLUTION INTRAMUSCULAR; INTRAVENOUS; SUBCUTANEOUS
Status: DISCONTINUED | OUTPATIENT
Start: 2022-12-11 | End: 2022-12-11 | Stop reason: HOSPADM

## 2022-12-11 RX ORDER — ONDANSETRON 2 MG/ML
4 INJECTION INTRAMUSCULAR; INTRAVENOUS
Status: DISCONTINUED | OUTPATIENT
Start: 2022-12-11 | End: 2022-12-11 | Stop reason: HOSPADM

## 2022-12-11 RX ORDER — CEFAZOLIN SODIUM 1 G/3ML
INJECTION, POWDER, FOR SOLUTION INTRAMUSCULAR; INTRAVENOUS PRN
Status: DISCONTINUED | OUTPATIENT
Start: 2022-12-11 | End: 2022-12-11 | Stop reason: SURG

## 2022-12-11 RX ORDER — LABETALOL HYDROCHLORIDE 5 MG/ML
5 INJECTION, SOLUTION INTRAVENOUS
Status: DISCONTINUED | OUTPATIENT
Start: 2022-12-11 | End: 2022-12-11 | Stop reason: HOSPADM

## 2022-12-11 RX ORDER — MEPERIDINE HYDROCHLORIDE 25 MG/ML
6.25 INJECTION INTRAMUSCULAR; INTRAVENOUS; SUBCUTANEOUS
Status: DISCONTINUED | OUTPATIENT
Start: 2022-12-11 | End: 2022-12-11 | Stop reason: HOSPADM

## 2022-12-11 RX ORDER — HALOPERIDOL 5 MG/ML
1 INJECTION INTRAMUSCULAR
Status: DISCONTINUED | OUTPATIENT
Start: 2022-12-11 | End: 2022-12-11 | Stop reason: HOSPADM

## 2022-12-11 RX ORDER — HYDROMORPHONE HYDROCHLORIDE 1 MG/ML
0.1 INJECTION, SOLUTION INTRAMUSCULAR; INTRAVENOUS; SUBCUTANEOUS
Status: DISCONTINUED | OUTPATIENT
Start: 2022-12-11 | End: 2022-12-11 | Stop reason: HOSPADM

## 2022-12-11 RX ORDER — SODIUM CHLORIDE, SODIUM LACTATE, POTASSIUM CHLORIDE, AND CALCIUM CHLORIDE .6; .31; .03; .02 G/100ML; G/100ML; G/100ML; G/100ML
500 INJECTION, SOLUTION INTRAVENOUS ONCE
Status: COMPLETED | OUTPATIENT
Start: 2022-12-11 | End: 2022-12-11

## 2022-12-11 RX ADMIN — PROPOFOL 150 MG: 10 INJECTION, EMULSION INTRAVENOUS at 12:47

## 2022-12-11 RX ADMIN — HYDROMORPHONE HYDROCHLORIDE 0.5 MCG: 2 INJECTION INTRAMUSCULAR; INTRAVENOUS; SUBCUTANEOUS at 13:32

## 2022-12-11 RX ADMIN — DOCUSATE SODIUM 100 MG: 100 CAPSULE, LIQUID FILLED ORAL at 05:36

## 2022-12-11 RX ADMIN — ONDANSETRON 4 MG: 2 INJECTION INTRAMUSCULAR; INTRAVENOUS at 13:54

## 2022-12-11 RX ADMIN — FENTANYL CITRATE 50 MCG: 50 INJECTION, SOLUTION INTRAMUSCULAR; INTRAVENOUS at 13:05

## 2022-12-11 RX ADMIN — FAMOTIDINE 20 MG: 10 INJECTION INTRAVENOUS at 00:39

## 2022-12-11 RX ADMIN — DOCUSATE SODIUM 100 MG: 100 CAPSULE, LIQUID FILLED ORAL at 18:43

## 2022-12-11 RX ADMIN — ACETAMINOPHEN 1000 MG: 500 TABLET ORAL at 20:25

## 2022-12-11 RX ADMIN — HYDROMORPHONE HYDROCHLORIDE 0.5 MCG: 2 INJECTION INTRAMUSCULAR; INTRAVENOUS; SUBCUTANEOUS at 13:14

## 2022-12-11 RX ADMIN — ROCURONIUM BROMIDE 50 MG: 10 INJECTION, SOLUTION INTRAVENOUS at 12:47

## 2022-12-11 RX ADMIN — OXYCODONE 10 MG: 5 TABLET ORAL at 20:25

## 2022-12-11 RX ADMIN — SODIUM CHLORIDE, POTASSIUM CHLORIDE, SODIUM LACTATE AND CALCIUM CHLORIDE: 600; 310; 30; 20 INJECTION, SOLUTION INTRAVENOUS at 04:49

## 2022-12-11 RX ADMIN — OXYCODONE HYDROCHLORIDE 10 MG: 5 SOLUTION ORAL at 14:08

## 2022-12-11 RX ADMIN — DEXAMETHASONE SODIUM PHOSPHATE 8 MG: 4 INJECTION, SOLUTION INTRA-ARTICULAR; INTRALESIONAL; INTRAMUSCULAR; INTRAVENOUS; SOFT TISSUE at 12:57

## 2022-12-11 RX ADMIN — ONDANSETRON 4 MG: 2 INJECTION INTRAMUSCULAR; INTRAVENOUS at 16:31

## 2022-12-11 RX ADMIN — FENTANYL CITRATE 50 MCG: 50 INJECTION, SOLUTION INTRAMUSCULAR; INTRAVENOUS at 14:08

## 2022-12-11 RX ADMIN — SODIUM CHLORIDE, POTASSIUM CHLORIDE, SODIUM LACTATE AND CALCIUM CHLORIDE 500 ML: 600; 310; 30; 20 INJECTION, SOLUTION INTRAVENOUS at 04:17

## 2022-12-11 RX ADMIN — SUGAMMADEX 200 MG: 100 INJECTION, SOLUTION INTRAVENOUS at 13:43

## 2022-12-11 RX ADMIN — MORPHINE SULFATE 4 MG: 4 INJECTION INTRAVENOUS at 06:36

## 2022-12-11 RX ADMIN — MORPHINE SULFATE 4 MG: 4 INJECTION INTRAVENOUS at 05:36

## 2022-12-11 RX ADMIN — MORPHINE SULFATE 4 MG: 4 INJECTION INTRAVENOUS at 04:13

## 2022-12-11 RX ADMIN — ONDANSETRON 4 MG: 2 INJECTION INTRAMUSCULAR; INTRAVENOUS at 06:26

## 2022-12-11 RX ADMIN — FENTANYL CITRATE 100 MCG: 50 INJECTION, SOLUTION INTRAMUSCULAR; INTRAVENOUS at 13:54

## 2022-12-11 RX ADMIN — SODIUM CHLORIDE, POTASSIUM CHLORIDE, SODIUM LACTATE AND CALCIUM CHLORIDE: 600; 310; 30; 20 INJECTION, SOLUTION INTRAVENOUS at 12:41

## 2022-12-11 RX ADMIN — ONDANSETRON 4 MG: 2 INJECTION INTRAMUSCULAR; INTRAVENOUS at 00:36

## 2022-12-11 RX ADMIN — MORPHINE SULFATE 4 MG: 4 INJECTION INTRAVENOUS at 01:53

## 2022-12-11 RX ADMIN — HYDROCODONE BITARTRATE AND ACETAMINOPHEN 1 TABLET: 5; 325 TABLET ORAL at 03:15

## 2022-12-11 RX ADMIN — PROPOFOL 50 MG: 10 INJECTION, EMULSION INTRAVENOUS at 13:30

## 2022-12-11 RX ADMIN — ROCURONIUM BROMIDE 10 MG: 10 INJECTION, SOLUTION INTRAVENOUS at 13:32

## 2022-12-11 RX ADMIN — FENTANYL CITRATE 50 MCG: 50 INJECTION, SOLUTION INTRAMUSCULAR; INTRAVENOUS at 12:41

## 2022-12-11 RX ADMIN — HYDROCODONE BITARTRATE AND ACETAMINOPHEN 1 TABLET: 5; 325 TABLET ORAL at 08:56

## 2022-12-11 RX ADMIN — CEFAZOLIN 2 G: 330 INJECTION, POWDER, FOR SOLUTION INTRAMUSCULAR; INTRAVENOUS at 12:57

## 2022-12-11 RX ADMIN — MORPHINE SULFATE 4 MG: 4 INJECTION INTRAVENOUS at 00:34

## 2022-12-11 RX ADMIN — MORPHINE SULFATE 4 MG: 4 INJECTION INTRAVENOUS at 16:31

## 2022-12-11 ASSESSMENT — PAIN DESCRIPTION - PAIN TYPE
TYPE: ACUTE PAIN
TYPE: SURGICAL PAIN
TYPE: ACUTE PAIN

## 2022-12-11 ASSESSMENT — PAIN SCALES - GENERAL: PAIN_LEVEL: 6

## 2022-12-11 NOTE — ANESTHESIA POSTPROCEDURE EVALUATION
Patient: Jolele Goldberg    Procedure Summary     Date: 12/11/22 Room / Location: [facility] 09 / SURGERY [facility]    Anesthesia Start: 1241 Anesthesia Stop: 1402    Procedure: CHOLECYSTECTOMY, LAPAROSCOPIC (Abdomen) Diagnosis: (cholecystitis)    Surgeons: Cynthia Soler M.D. Responsible Provider: Quentin Mayorga M.D.    Anesthesia Type: general ASA Status: 2 - Emergent          Final Anesthesia Type: general  Last vitals  BP   Blood Pressure: 119/67    Temp   37.2 °C (98.9 °F)    Pulse   88   Resp   20    SpO2   94 %      Anesthesia Post Evaluation    Patient location during evaluation: PACU  Patient participation: complete - patient participated  Level of consciousness: awake and alert  Pain score: 6    Airway patency: patent  Anesthetic complications: no  Cardiovascular status: hemodynamically stable  Respiratory status: acceptable  Hydration status: euvolemic    PONV: none          No notable events documented.     Nurse Pain Score: 7 (NPRS)        
No

## 2022-12-11 NOTE — H&P
Admission History and Physical for Observation      Joelle Goldberg is a 22 y.o. female  at 24w3d who presents for abdominal pain    Patient presented to OB ED with complaints of upper abdominal pain radiating to her back and worsening over the past three hours.      Patient reported she had eggs for lunch and cereal for dinner. She drank water thinking pain was related to dehydration but it progressively worsened. She denies any injury or fall. Denies presence of vaginal bleeding. She initially rated pain 10/10 and was having vomiting. She was provided antiemetic, antacid, and morphine IV in triage with resolution of pain. However, pain returned within one hour of administration localized to patient back and decision made for observation.    ROS: Constitutional: positive for fatigue  Gastrointestinal: positive for abdominal pain and nausea  Musculoskeletal:positive for back pain    Past Medical History:   Diagnosis Date    Anemia affecting pregnancy in third trimester 2020    Anxiety     Pregnant      Past Surgical History:   Procedure Laterality Date    REPEAT C SECTION N/A 2020    Procedure:  SECTION, REPEAT;  Surgeon: Gladys Murcia D.O.;  Location: LABOR AND DELIVERY;  Service: Obstetrics    REPEAT C SECTION  3/11/2018    Procedure: REPEAT C SECTION;  Surgeon: Brandy Moreland M.D.;  Location: LABOR AND DELIVERY;  Service: Labor and Delivery    PRIMARY C SECTION  3/17/2015    Performed by Blue Andino M.D. at LABOR AND DELIVERY     OB History    Para Term  AB Living   5 3 3   1 3   SAB IAB Ectopic Molar Multiple Live Births   1       0 3      # Outcome Date GA Lbr Howard/2nd Weight Sex Delivery Anes PTL Lv   5 Current            4 Term 20 39w0d  3.395 kg (7 lb 7.8 oz) M CS-LTranv Spinal N OXANA   3 Term 18 39w0d  3.61 kg (7 lb 15.3 oz) M CS-LTranv Spinal N OXANA      Birth Comments: C/Section for repeat   2 SAB 16 18w0d   M SAB  Y FD      Birth  Comments: baby passed on its own in [place] at home, but D&C needed   1 Term 03/17/15 40w3d  2.863 kg (6 lb 5 oz) M CS-LTranv EPI N OXANA      Birth Comments: baby's heart rate dropped      Complications: Fetal Intolerance     Social History     Socioeconomic History    Marital status:      Spouse name: Not on file    Number of children: Not on file    Years of education: Not on file    Highest education level: Not on file   Occupational History    Not on file   Tobacco Use    Smoking status: Former     Types: Cigarettes    Smokeless tobacco: Never   Vaping Use    Vaping Use: Never used   Substance and Sexual Activity    Alcohol use: Not Currently    Drug use: Not Currently    Sexual activity: Not Currently     Partners: Male     Birth control/protection: Condom     Comment: None   Other Topics Concern    Behavioral problems Not Asked    Interpersonal relationships Not Asked    Sad or not enjoying activities Not Asked    Suicidal thoughts Not Asked    Poor school performance Not Asked    Reading difficulties Not Asked    Speech difficulties Not Asked    Writing difficulties Not Asked    Inadequate sleep Not Asked    Excessive TV viewing Not Asked    Excessive video game use Not Asked    Inadequate exercise Not Asked    Sports related Not Asked    Poor diet Not Asked    Family concerns for drug/alcohol abuse Not Asked    Poor oral hygiene Not Asked    Bike safety Not Asked    Family concerns vehicle safety Not Asked   Social History Narrative    Not on file     Social Determinants of Health     Financial Resource Strain: Not on file   Food Insecurity: Not on file   Transportation Needs: Not on file   Physical Activity: Not on file   Stress: Not on file   Social Connections: Not on file   Intimate Partner Violence: Not on file   Housing Stability: Not on file     Allergies: Patient has no known allergies.    Current Facility-Administered Medications:     morphine 4 MG/ML injection 4 mg, 4 mg, Intravenous, Q HOUR  "PRN, Carrissia Aleks, OGNP, 4 mg at 22 0153    HYDROcodone-acetaminophen (NORCO) 5-325 MG per tablet 1 Tablet, 1 Tablet, Oral, Q4HRS PRN, Carrissia Aleks, OGNP, 1 Tablet at 22 0315    lactated ringer BOLUS infusion, 500 mL, Intravenous, Once, Carrissia Aleks, OGNP    lactated ringers infusion, , Intravenous, Continuous, Carrissia Aleks, OGNP    docusate sodium (COLACE) capsule 100 mg, 100 mg, Oral, BID, Carrissia Aleks, OGNP    ondansetron (ZOFRAN) syringe/vial injection 4 mg, 4 mg, Intravenous, Q4HRS PRN, Carrissia Aleks, OGNP, 4 mg at 22 0036    famotidine (PEPCID) injection 20 mg, 20 mg, Intravenous, BID, Carrissia Aleks, OGNP, 20 mg at 22 0039      Patient Active Problem List    Diagnosis Date Noted    Previous  section 2022         Last US at 24 weeks for confirmation of pregnancy two days ago. Has not established prenatal care.         Objective:      /67   Pulse (!) 106   Temp 36.1 °C (97 °F) (Temporal)   Resp 20   Ht 1.651 m (5' 5\")   Wt 100 kg (221 lb)   SpO2 (!) 82%     General:   alert, cooperative, appears weak   Skin:   normal   HEENT:  PERRLA   Lungs:   CTA bilateral   Heart:   S1, S2 normal, no murmur, click, rub or gallop, regular rate and rhythm, peripheral pulses very brisk, chest is clear without rales or wheezing, no pedal edema   Abdomen:   gravid, NT, +sanchez sign     Lab Review  Lab:   Blood type: AB     Recent Results (from the past 5880 hour(s))   EKG    Collection Time: 22  4:58 PM   Result Value Ref Range    Report       [facility] Emergency Dept.    Test Date:  2022  Pt Name:    CARL MEZA     Department: ER  MRN:        [id number]                      Room:  Gender:     Female                       Technician: [id number]  :        2000                   Requested By:ER TRIAGE PROTOCOL  Order #:    [id number]                    Reading MD: Ankit Pozo, " MD    Measurements  Intervals                                Axis  Rate:       89                           P:          36  MT:         168                          QRS:        56  QRSD:       80                           T:          8  QT:         340  QTc:        414    Interpretive Statements  SINUS RHYTHM  BASELINE WANDER IN LEAD(S) V2  Compared to ECG 06/01/2019 21:40:02  No significant changes  Electronically Signed On 6- 22:15:29 PDT by Ankit Pozo MD     CoV-2, FLU A/B, and RSV by PCR (2-4 Hours CEPHEID) : Collect NP swab in VTM    Collection Time: 06/23/22  6:05 PM    Specimen: Respirate   Result Value Ref Range    Influenza virus A RNA POSITIVE (A) Negative    Influenza virus B, PCR Negative Negative    RSV, PCR Negative Negative    SARS-CoV-2 by PCR NotDetected     SARS-CoV-2 Source NP Swab    URINALYSIS    Collection Time: 06/23/22  6:43 PM    Specimen: Urine, Clean Catch   Result Value Ref Range    Color DK Yellow     Character Turbid (A)     Specific Gravity 1.033 <1.035    Ph 6.0 5.0 - 8.0    Glucose Negative Negative mg/dL    Ketones Trace (A) Negative mg/dL    Protein 30 (A) Negative mg/dL    Bilirubin Negative Negative    Urobilinogen, Urine 1.0 Negative    Nitrite Negative Negative    Leukocyte Esterase Negative Negative    Occult Blood Negative Negative    Micro Urine Req Microscopic    URINE MICROSCOPIC (W/UA)    Collection Time: 06/23/22  6:43 PM   Result Value Ref Range    WBC 0-2 /hpf    RBC 0-2 /hpf    Bacteria Moderate (A) None /hpf    Epithelial Cells Many (A) /hpf   URINE CULTURE (ADD ON)    Collection Time: 06/23/22  6:43 PM    Specimen: Urine, Clean Catch   Result Value Ref Range    Significant Indicator NEG     Source UR     Site URINE, CLEAN CATCH     Culture Result       Usual skin mary lou >100,000 cfu/mL  Greater than 3 organisms isolated, culture of doubtful  significance, please recollect.     PRENATAL PANEL 3+HIV+HCV    Collection Time: 12/11/22 12:30 AM   Result Value Ref  Range    WBC 15.0 (H) 4.8 - 10.8 K/uL    RBC 4.89 4.20 - 5.40 M/uL    Hemoglobin 11.7 (L) 12.0 - 16.0 g/dL    Hematocrit 36.4 (L) 37.0 - 47.0 %    MCV 74.4 (L) 81.4 - 97.8 fL    MCH 23.9 (L) 27.0 - 33.0 pg    MCHC 32.1 (L) 33.6 - 35.0 g/dL    RDW 39.8 35.9 - 50.0 fL    Platelet Count 357 164 - 446 K/uL    MPV 9.7 9.0 - 12.9 fL    Neutrophils-Polys 84.80 (H) 44.00 - 72.00 %    Lymphocytes 10.40 (L) 22.00 - 41.00 %    Monocytes 4.00 0.00 - 13.40 %    Eosinophils 0.00 0.00 - 6.90 %    Basophils 0.20 0.00 - 1.80 %    Immature Granulocytes 0.60 0.00 - 0.90 %    Nucleated RBC 0.00 /100 WBC    Neutrophils (Absolute) 12.70 (H) 2.00 - 7.15 K/uL    Lymphs (Absolute) 1.56 1.00 - 4.80 K/uL    Monos (Absolute) 0.60 0.00 - 0.85 K/uL    Eos (Absolute) 0.00 0.00 - 0.51 K/uL    Baso (Absolute) 0.03 0.00 - 0.12 K/uL    Immature Granulocytes (abs) 0.09 0.00 - 0.11 K/uL    NRBC (Absolute) 0.00 K/uL    Rubella IgG Antibody 33.90 IU/mL    Hepatitis B Surface Antigen Non-Reactive Non-Reactive    Hepatitis C Antibody Non-Reactive Non-Reactive    Syphilis, Treponemal Qual Non-Reactive Non-Reactive   HIV AG/AB COMBO ASSAY SCREENING    Collection Time: 12/11/22 12:30 AM   Result Value Ref Range    HIV Ag/Ab Combo Assay Non-Reactive Non Reactive   Comp Metabolic Panel    Collection Time: 12/11/22 12:30 AM   Result Value Ref Range    Sodium 136 135 - 145 mmol/L    Potassium 3.8 3.6 - 5.5 mmol/L    Chloride 104 96 - 112 mmol/L    Co2 17 (L) 20 - 33 mmol/L    Anion Gap 15.0 7.0 - 16.0    Glucose 104 (H) 65 - 99 mg/dL    Bun 8 8 - 22 mg/dL    Creatinine 0.58 0.50 - 1.40 mg/dL    Calcium 9.6 8.5 - 10.5 mg/dL    AST(SGOT) 12 12 - 45 U/L    ALT(SGPT) 10 2 - 50 U/L    Alkaline Phosphatase 124 (H) 30 - 99 U/L    Total Bilirubin 0.3 0.1 - 1.5 mg/dL    Albumin 4.0 3.2 - 4.9 g/dL    Total Protein 8.3 (H) 6.0 - 8.2 g/dL    Globulin 4.3 (H) 1.9 - 3.5 g/dL    A-G Ratio 0.9 g/dL   LIPASE    Collection Time: 12/11/22 12:30 AM   Result Value Ref Range    Lipase  18 11 - 82 U/L   OP Prenatal Panel-Blood Bank    Collection Time: 22 12:30 AM   Result Value Ref Range    ABO Grouping Only AB     Rh Grouping Only POS     Antibody Screen Scrn NEG    ESTIMATED GFR    Collection Time: 22 12:30 AM   Result Value Ref Range    GFR (CKD-EPI) 129 >60 mL/min/1.73 m 2          Assessment:   Joelle Boss Ashlyn at 24w3d  Labor status: Not in labor. and symptomatic cholelithiasis  Obstetrical history significant for   Patient Active Problem List    Diagnosis Date Noted    Previous  section 2022     .      Plan:     1. Observation  2. IV hydration and pain control at this time. If pain unresolved after several hours, will plan surgical consultation. Discussed this plan with patient and she agrees. She is concerned about possible need for surgical intervention and how that would affect pregnancy. We discussed this briefly. We also discussed outpatient management.       Arnaldo STOCK/ Dr. Gore Attending

## 2022-12-11 NOTE — PROGRESS NOTES
"Antepartum Progress Note    Joelle Goldberg   24w3d  Admission DX: Pregnancy    Date of Admission: 12/10/2022  Patient Active Problem List    Diagnosis Date Noted    Previous  section 2022       Subjective:   Reports pain still present in RUQ and upper abdomen radiating to back. She says morphine is helping but still pain returns. No emesis.   Denies contractions, vaginal bleeding or leakage of fluid.     Objective:   Vitals:    12/10/22 2335 22 0100 22 0200 22 0300   BP: 124/67      Pulse: (!) 106 76 72 85   Resp: 20      Temp: 36.1 °C (97 °F)      TempSrc: Temporal      SpO2: (!) 82% 91% 93% 95%   Weight: 100 kg (221 lb)      Height: 1.651 m (5' 5\")        Fetal heart variability: moderate  Gen: NAD  Membranes: ruptured: no  Abdomen: gravid, soft, diffuse moderate tenderness in RUQ and upper abdomen, no guarding or peritoneal signs. + Calderon sign. No fundal tenderness.   Ext: Nt, no cyanosis or clubbing    Meds:     Current Facility-Administered Medications:     morphine 4 MG/ML injection 4 mg, 4 mg, Intravenous, Q HOUR PRN, Ihsan Fink, OGNP, 4 mg at 22 0636    HYDROcodone-acetaminophen (NORCO) 5-325 MG per tablet 1 Tablet, 1 Tablet, Oral, Q4HRS PRN, Ihsan Fink, OGNP, 1 Tablet at 22 0315    lactated ringers infusion, , Intravenous, Continuous, CRYSTAL PayneNP, Last Rate: 125 mL/hr at 22 0449, New Bag at 22 0449    docusate sodium (COLACE) capsule 100 mg, 100 mg, Oral, BID, Anassia Aleks, OGNP, 100 mg at 22 0536    ondansetron (ZOFRAN) syringe/vial injection 4 mg, 4 mg, Intravenous, Q4HRS PRN, Ihsan Fink, OGNP, 4 mg at 22 0626    famotidine (PEPCID) injection 20 mg, 20 mg, Intravenous, BID, Anassia Aleks, OGNP, 20 mg at 22 0039      Imaging:    Lab:   Recent Results (from the past 72 hour(s))   PRENATAL PANEL 3+HIV+HCV    Collection Time: 22 12:30 AM   Result Value Ref Range    WBC 15.0 (H) " 4.8 - 10.8 K/uL    RBC 4.89 4.20 - 5.40 M/uL    Hemoglobin 11.7 (L) 12.0 - 16.0 g/dL    Hematocrit 36.4 (L) 37.0 - 47.0 %    MCV 74.4 (L) 81.4 - 97.8 fL    MCH 23.9 (L) 27.0 - 33.0 pg    MCHC 32.1 (L) 33.6 - 35.0 g/dL    RDW 39.8 35.9 - 50.0 fL    Platelet Count 357 164 - 446 K/uL    MPV 9.7 9.0 - 12.9 fL    Neutrophils-Polys 84.80 (H) 44.00 - 72.00 %    Lymphocytes 10.40 (L) 22.00 - 41.00 %    Monocytes 4.00 0.00 - 13.40 %    Eosinophils 0.00 0.00 - 6.90 %    Basophils 0.20 0.00 - 1.80 %    Immature Granulocytes 0.60 0.00 - 0.90 %    Nucleated RBC 0.00 /100 WBC    Neutrophils (Absolute) 12.70 (H) 2.00 - 7.15 K/uL    Lymphs (Absolute) 1.56 1.00 - 4.80 K/uL    Monos (Absolute) 0.60 0.00 - 0.85 K/uL    Eos (Absolute) 0.00 0.00 - 0.51 K/uL    Baso (Absolute) 0.03 0.00 - 0.12 K/uL    Immature Granulocytes (abs) 0.09 0.00 - 0.11 K/uL    NRBC (Absolute) 0.00 K/uL    Rubella IgG Antibody 33.90 IU/mL    Hepatitis B Surface Antigen Non-Reactive Non-Reactive    Hepatitis C Antibody Non-Reactive Non-Reactive    Syphilis, Treponemal Qual Non-Reactive Non-Reactive   HIV AG/AB COMBO ASSAY SCREENING    Collection Time: 12/11/22 12:30 AM   Result Value Ref Range    HIV Ag/Ab Combo Assay Non-Reactive Non Reactive   Comp Metabolic Panel    Collection Time: 12/11/22 12:30 AM   Result Value Ref Range    Sodium 136 135 - 145 mmol/L    Potassium 3.8 3.6 - 5.5 mmol/L    Chloride 104 96 - 112 mmol/L    Co2 17 (L) 20 - 33 mmol/L    Anion Gap 15.0 7.0 - 16.0    Glucose 104 (H) 65 - 99 mg/dL    Bun 8 8 - 22 mg/dL    Creatinine 0.58 0.50 - 1.40 mg/dL    Calcium 9.6 8.5 - 10.5 mg/dL    AST(SGOT) 12 12 - 45 U/L    ALT(SGPT) 10 2 - 50 U/L    Alkaline Phosphatase 124 (H) 30 - 99 U/L    Total Bilirubin 0.3 0.1 - 1.5 mg/dL    Albumin 4.0 3.2 - 4.9 g/dL    Total Protein 8.3 (H) 6.0 - 8.2 g/dL    Globulin 4.3 (H) 1.9 - 3.5 g/dL    A-G Ratio 0.9 g/dL   LIPASE    Collection Time: 12/11/22 12:30 AM   Result Value Ref Range    Lipase 18 11 - 82 U/L   OP  Prenatal Panel-Blood Bank    Collection Time: 22 12:30 AM   Result Value Ref Range    ABO Grouping Only AB     Rh Grouping Only POS     Antibody Screen Scrn NEG    ESTIMATED GFR    Collection Time: 22 12:30 AM   Result Value Ref Range    GFR (CKD-EPI) 129 >60 mL/min/1.73 m 2       Imagin/10/2022 11:41 PM     HISTORY/REASON FOR EXAM:  Pain  Abdominal pain     TECHNIQUE/EXAM DESCRIPTION AND NUMBER OF VIEWS:  Real-time sonography of the liver and biliary tree.     COMPARISON: None     FINDINGS:     The liver measures 15.60 cm. The liver is normal in contour. There is no evidence of solid mass lesion.     Layering gallstones are present.  The gallbladder wall thickness measures 2.60 mm. There is no pericholecystic fluid.  The common duct is obscured.     The visualized pancreas is unremarkable.  The visualized aorta is normal in caliber.     Intrahepatic IVC is patent.     The portal vein is patent with hepatopetal flow. The MPV measures 0.8 cm.     The right kidney measures 11.92 cm. The right kidney has normal cortical size and echotexture. Mild to moderate right hydronephrosis.     There is no ascites.     IMPRESSION:     1. Cholelithiasis.  2. Mild to moderate right hydronephrosis could be pregnancy related.    Assessment:  22 y.o.  @ 24w3d with cholelithiasis.   Fetal status reassuring.   Patient still in pain despite IV pain meds. Discussed option of surgery consult with patient as this would be the optimal time to undergo cholecystectomy in terms of gestational age. Ideally we would wait until after delivery to refer to general surgery but risk of returning with recurrent pain, risk of cholecystitis and being further along in pregnancy would make surgery higher risk.   Recommended general surgery consult.     Plan:  Keep NPO, IV fluids going.   Consulted general surgery.     Janet Gore M.D.    Obstetrics and Gynecology    2022 7:41 AM

## 2022-12-11 NOTE — H&P
DATE OF CONSULTATION:  12/11/2022     REFERRING PHYSICIAN:   Dr. Parham    CONSULTING PHYSICIAN:  Cynthia Soler M.D.     REASON FOR CONSULTATION:  Abdominal Pain    HISTORY OF PRESENT ILLNESS: The patient is a very pleasant 22 year old female, 24 weeks pregnant with one day of right upper quadrant pain that began after eating scrambled eggs. She describes the pain as sharp, constant, radiates to her back and is worsened with eating.  She does have nausea/vomiting. Denies darkened urine or acholic stools.  She denies history of similar type abdominal pain.  USD showed cholelithiasis, normal caliber intrahepatic bile ducts, no gallbladder wall thickening.     PAST MEDICAL HISTORY:  has a past medical history of Anemia affecting pregnancy in third trimester (7/24/2020), Anxiety, and Pregnant.    She has no past medical history of Addisons disease (HCC), Adrenal disorder (HCC), Allergy, Arrhythmia, Arthritis, Asthma, Blood transfusion without reported diagnosis, Cancer (HCC), Cataract, CHF (congestive heart failure) (HCC), Clotting disorder (HCC), COPD (chronic obstructive pulmonary disease) (HCC), Cushings syndrome (HCC), Depression, Diabetes (HCC), Diabetic neuropathy (HCC), GERD (gastroesophageal reflux disease), Glaucoma, Goiter, Head ache, Headache(784.0), Heart attack (HCC), Heart murmur, HIV (human immunodeficiency virus infection) (HCC), Hyperlipidemia, Hypertension, IBD (inflammatory bowel disease), Kidney disease, Meningitis, Migraine, Muscle disorder, Osteoporosis, Parathyroid disorder (HCC), Pituitary disease (HCC), Pulmonary emphysema (HCC), Seizure (HCC), Sickle cell disease (HCC), Stroke (HCC), Substance abuse (HCC), Thyroid disease, Tuberculosis, Ulcer, or Urinary tract infection, site not specified.    PAST SURGICAL HISTORY:  has a past surgical history that includes primary c section (3/17/2015); repeat c section (3/11/2018); and repeat c section (N/A, 8/14/2020).    ALLERGIES: No Known  Allergies    CURRENT MEDICATIONS:    Home Medications       Reviewed by Quentin Mayorga M.D. (Physician) on 12/11/22 at 1158  Med List Status: <None>     Medication Last Dose Status   Prenatal MV-Min-Fe Fum-FA-DHA (PRENATAL 1 PO)  Active                    FAMILY HISTORY: family history includes Arthritis in her mother; Diabetes in her father; Hyperlipidemia in her father and mother; No Known Problems in her brother.    SOCIAL HISTORY:  reports that she has quit smoking. Her smoking use included cigarettes. She has never used smokeless tobacco. She reports that she does not currently use alcohol. She reports that she does not currently use drugs.    REVIEW OF SYSTEMS: Review of systems is remarkable for the following abdominal pain. The remainder of the comprehensive ROS is negative with the exception of the aforementioned HPI, PMH, and PSH bullets in accordance with CMS guideline.    PHYSICAL EXAMINATION:    Physical Exam  Constitutional:       General: She is not in acute distress.     Appearance: Normal appearance. She is not ill-appearing or toxic-appearing.   HENT:      Mouth/Throat:      Mouth: Mucous membranes are dry.   Eyes:      General: No scleral icterus.     Pupils: Pupils are equal, round, and reactive to light.   Cardiovascular:      Rate and Rhythm: Normal rate and regular rhythm.   Pulmonary:      Effort: Pulmonary effort is normal.      Breath sounds: Normal breath sounds.   Abdominal:      Palpations: Abdomen is soft.      Tenderness: There is abdominal tenderness.      Comments: RUQ tenderness with light palpation, voluntary guarding.  Uterus palpable to just above umbilicus.    Musculoskeletal:         General: No swelling.      Right lower leg: No edema.      Left lower leg: No edema.   Skin:     General: Skin is warm.      Coloration: Skin is not jaundiced.   Neurological:      General: No focal deficit present.      Mental Status: She is alert.   Psychiatric:         Mood and Affect: Mood  normal.       LABORATORY VALUES:   Recent Labs     12/11/22 0030   WBC 15.0*   RBC 4.89   HEMOGLOBIN 11.7*   HEMATOCRIT 36.4*   MCV 74.4*   MCH 23.9*   MCHC 32.1*   RDW 39.8   PLATELETCT 357   MPV 9.7     Recent Labs     12/11/22 0030   SODIUM 136   POTASSIUM 3.8   CHLORIDE 104   CO2 17*   GLUCOSE 104*   BUN 8   CREATININE 0.58   CALCIUM 9.6     Recent Labs     12/11/22 0030   ASTSGOT 12   ALTSGPT 10   TBILIRUBIN 0.3   ALKPHOSPHAT 124*   GLOBULIN 4.3*            IMAGING:   US-RUQ   Final Result      1. Cholelithiasis.   2. Mild to moderate right hydronephrosis could be pregnancy related.             ASSESSMENT AND PLAN:   22 year old female, 24 weeks pregnant with right upper quadrant pain, cholelithiasis, very tender overlying gallbladder on exam. Concern is for early cholecystitis.  I have recommended to the patient to proceed with laparoscopic cholecystectomy, indications and risks discussed.   A po trial with observations was offered prior to proceeding but patient stated she is afraid to eat due to the pain she already is experiencing.  Associated risks discussed; infection, bleeding, injury to biliary ducts or bowel requiring biliary reconstruction, retained stone or post operative bile leak requiring ERCP. Discussed  Possible need for open surgery, incisional pain and incisional hernias, pre-term labor resulting in fetal demise. Patient stated they understand the risks of surgery and wish to proceed.   I discussed plan with patient's OB/gyn physician Dr. Parham whom recommends pre-op and post FHT         ____________________________________     Cynthia Soler M.D.    DD: 12/11/2022  12:25 PM    ACS NSQIP Surgical Risk Calculator

## 2022-12-11 NOTE — ANESTHESIA PREPROCEDURE EVALUATION
Case: [id number] Date/Time: 12/11/22 1231    Procedure: CHOLECYSTECTOMY, LAPAROSCOPIC    Location: [facility] OR 09 / SURGERY [facility]    Surgeons: Cynthia Soler M.D.          Relevant Problems   Other   (positive) 24 weeks gestation of pregnancy       Physical Exam    Airway   Mallampati: II  TM distance: >3 FB  Neck ROM: full       Cardiovascular - normal exam  Rhythm: regular  Rate: normal  (-) murmur     Dental - normal exam           Pulmonary - normal exam  Breath sounds clear to auscultation     Abdominal   (+) obese     Neurological - normal exam               Anesthesia Plan    ASA 2- EMERGENT   ASA physical status emergent criteria: acute peritonitis    Plan - general       Airway plan will be ETT    (RSI, pre and post op fetal monitoring per OB recs)      Induction: intravenous    Postoperative Plan: Postoperative administration of opioids is intended.    Pertinent diagnostic labs and testing reviewed    Informed Consent:    Anesthetic plan and risks discussed with patient.    Use of blood products discussed with: patient whom consented to blood products.

## 2022-12-11 NOTE — OR NURSING
This RN spoke to PACU Charge RN Shanna who will call primary L&D RN Mamie to obtain fetal monitoring post surgery.

## 2022-12-11 NOTE — ASSESSMENT & PLAN NOTE
1 day RUQ pain.  USD showed cholelithiasis, normal caliber intrahepatic bile ducts, no gallbladder wall thickening.  12/11 To OR for Lap cholecystectomy.  ACS Blue Service.

## 2022-12-11 NOTE — PROGRESS NOTES
2325  at 24+2 presents to L+D w/ c/o severe RUQ pain and nausea vomitting. She reports its started around 1730 and that she ate eggs for lunch. Pt denies LOF or vaginal bleeding. Yemi STOCK at bedside, heart tones dopplered at [id number]. Niles applied. VSS. Orders received    0045 pt now resting comfortably following pain medication    0138 pt reporting increased pain, Yemi STOCK at bedside to review US results      0315 pt reporting 7/10 pain, medicated per MAR. Yemi STOCK at bedside, orders received to ante admit for pain control and possible surgery consult

## 2022-12-11 NOTE — OR NURSING
Pt A&OX4. VSS. Pt on 2 L of oxygen via nasal cannula. Afebrile. Four lap sites to abd, dermabond closure CDI. Ice pack in place. No complaints of nausea, tolerated sips of water. PO and IV pain medication administered for severe pain per pt report. Pain down to a moderate level now and described as pressure to abd. Report called to ZAIRE Hatch. Pt awaiting transport.

## 2022-12-11 NOTE — PROGRESS NOTES
Spoke with on call general surgeon Dr. Cynthia Soler who plans to take patient for a laparoscopic cholecystectomy. Recommended pre and post op fetal monitoring. Will coordinate with L&D staffing.

## 2022-12-11 NOTE — ANESTHESIA PROCEDURE NOTES
Airway    Date/Time: 12/11/2022 12:51 PM  Performed by: Quetnin Mayorga M.D.  Authorized by: Quentin Mayorga M.D.     Location:  OR  Urgency:  Elective  Indications for Airway Management:  Anesthesia      Spontaneous Ventilation: absent    Sedation Level:  Deep  Preoxygenated: Yes    Patient Position:  Sniffing  Mask Difficulty Assessment:  0 - not attempted  Final Airway Type:  Endotracheal airway  Final Endotracheal Airway:  ETT  Cuffed: Yes    Technique Used for Successful ETT Placement:  Direct laryngoscopy  Devices/Methods Used in Placement:  Cricoid pressure    Insertion Site:  Oral  Blade Type:  Bearden  Laryngoscope Blade/Videolaryngoscope Blade Size:  2  ETT Size (mm):  7.0  Measured from:  Teeth  ETT to Teeth (cm):  20  Placement Verified by: auscultation and capnometry    Cormack-Lehane Classification:  Grade I - full view of glottis  Number of Attempts at Approach:  1   RSI

## 2022-12-11 NOTE — ED PROVIDER NOTES
Emergency Obstetric Consultation     Date of Service  12/10/2022    Reason for Consultation  Chief Complaint   Patient presents with    Abdominal Injury       History of Presenting Illness  22 y.o. female who presented 12/10/2022 with Reason for consult: nauseaPatient presents to triage with complaints of upper abdominal pain radiating to her back and worsening.     Patient reports she had eggs for lunch and cereal for dinner. She drank water thinking pain was related to dehydration but it progressively worsened. She denies any injury or fall. Denies presence of vaginal bleeding. She rates pain 10/10 at current and is having vomiting.   Fetal activity: Perceived fetal activity is normal.    .    Review of Systems  Review of Systems   Constitutional: Negative.    HENT: Negative.     Eyes: Negative.    Respiratory: Negative.     Gastrointestinal:  Positive for abdominal pain, constipation, nausea and vomiting.   Genitourinary:  Negative for dysuria, frequency and urgency.   Musculoskeletal:  Positive for back pain.   Neurological: Negative.      Obstetric History    OB History    Para Term  AB Living   5 3 3   1 3   SAB IAB Ectopic Molar Multiple Live Births   1       0 3      # Outcome Date GA Lbr Howard/2nd Weight Sex Delivery Anes PTL Lv   5 Current            4 Term 20 39w0d  3.395 kg (7 lb 7.8 oz) M CS-LTranv Spinal N OXANA   3 Term 18 39w0d  3.61 kg (7 lb 15.3 oz) M CS-LTranv Spinal N OXANA      Birth Comments: C/Section for repeat   2 SAB 16 18w0d   M SAB  Y FD      Birth Comments: baby passed on its own in [place] at home, but D&C needed   1 Term 03/17/15 40w3d  2.863 kg (6 lb 5 oz) M CS-LTranv EPI N OXANA      Birth Comments: baby's heart rate dropped      Complications: Fetal Intolerance       Gynecologic History  Patient's last menstrual period was 2022.    Medical History  Past Medical History:   Diagnosis Date    Anemia affecting pregnancy in third trimester 2020     Anxiety     Pregnant        Surgical History   has a past surgical history that includes primary c section (3/17/2015); repeat c section (3/11/2018); and repeat c section (N/A, 2020).    Family History  family history includes Arthritis in her mother; Diabetes in her father; Hyperlipidemia in her father and mother; No Known Problems in her brother.    Social History   reports that she has quit smoking. Her smoking use included cigarettes. She has never used smokeless tobacco. She reports that she does not currently use alcohol. She reports that she does not currently use drugs.    Medications  Medications Prior to Admission   Medication Sig Dispense Refill Last Dose    Prenatal MV-Min-Fe Fum-FA-DHA (PRENATAL 1 PO) Take  by mouth.          Allergies  No Known Allergies    Physical Exam  Maternal Exam:  Abdomen: Patient reports the following abdominal tenderness: RUQ.   Surgical scars: low transverse.    Cervix: not evaluated.  Mode: hand-held doppler probe.    Abdominal:      General: Bowel sounds are normal.      Palpations: Abdomen is soft.      Tenderness: There is abdominal tenderness in the right upper quadrant. Positive signs include Calderon's sign.   Constitutional:       Comments: Writhing in bed in pain       Assessment & Plan  Assessment:  Not in labor.   1. 21 yo  with IUP at 24.2 weeks  2. Right Upper Quadrant Abdominal Pain  3. Nausea and Vomiting    Plan:  1. Prenatal panel, CMP, Lipase. Stat RUQ ultrasound. Educated patient on concern for cholecystitis at current.  2. Morphine for pain, IV hydration, zofran, pepcid.         TAMMY Payne

## 2022-12-11 NOTE — DISCHARGE INSTRUCTIONS
If any questions arise, call your provider.  If your provider is not available, please feel free to call the Surgical Center at [phone].    MEDICATIONS: Resume taking daily medication.  Take prescribed pain medication with food.  If no medication is prescribed, you may take non-aspirin pain medication if needed.  PAIN MEDICATION CAN BE VERY CONSTIPATING.  Take a stool softener or laxative such as senokot, pericolace, or milk of magnesia if needed.    Last pain medication given at 2:08 pm (oxycodone 10 mg).    What to Expect Post Anesthesia    Rest and take it easy for the first 24 hours.  A responsible adult is recommended to remain with you during that time.  It is normal to feel sleepy.  We encourage you to not do anything that requires balance, judgment or coordination.    FOR 24 HOURS DO NOT:  Drive, operate machinery or run household appliances.  Drink beer or alcoholic beverages.  Make important decisions or sign legal documents.    To avoid nausea, slowly advance diet as tolerated, avoiding spicy or greasy foods for the first day.  Add more substantial food to your diet according to your provider's instructions.  Babies can be fed formula or breast milk as soon as they are hungry.  INCREASE FLUIDS AND FIBER TO AVOID CONSTIPATION.    MILD FLU-LIKE SYMPTOMS ARE NORMAL.  YOU MAY EXPERIENCE GENERALIZED MUSCLE ACHES, THROAT IRRITATION, HEADACHE AND/OR SOME NAUSEA.    Diet    Resume your normal diet as tolerated.  A diet low in cholesterol, fat, and sodium is recommended for good health.       Laparoscopic Cholecystectomy Discharge instructions    ACTIVITIES: After discharge from the hospital, you may resume full routine activities. However, there should be no heavy lifting (greater than 15 pounds) and no strenuous activities until after your follow-up visit. Otherwise, routine activities of daily living are acceptable.    DRIVING: You may drive whenever you are off pain medications and are able to perform  the activities needed to drive, i.e. turning, bending, twisting, etc.    BATHING: You may get the wound wet at any time after leaving the hospital. You may shower, but do not submerge in a bath for at least a week.    WOUND CARE:    It is normal to have tenderness, discomfort or mild swelling at the site of the incisions.  If you have wound dressings, they may come off after 48 hours. If you have skin glue to the wound, this will fall off on its own, do not pick at it. If you have steri strips to the wound, these will fall off on their own, do not pick at them, may trim the edges if needed.    Avoid getting lotions, powders or deodorant on the incision while it is healing. Don't worry if the area under either incision feels firm or hard. This is normal and usually softens within a few months.    BOWEL FUNCTION: A few patients, after this operation, will develop either frequent or loose stools after meals. This usually corrects itself after a few days, to a few weeks. If this occurs, do not worry; it is not unusual and will resolve. Much more common than loose stools, is constipation. The combination of pain medication and decreased activity level can cause constipation in otherwise normal patients. If you feel this is occurring, take a laxative (Milk of Magnesia, Ex-Lax, Senokot, etc.) until the problem has resolved.    It also helps to stay regular by including fiber in your diet (for example: bran or fruits and vegetables) and drink plenty of liquids (water, juice, etc.).      Pre-term Labor (<37 weeks):  Call your physician or return to the hospital if:  You have painless regular contractions more than 4 in one hour.  Your water breaks (remember time and color).  You have menstrual-like cramps, a low dull backache or pressure in your pelvis or back.  Your baby does not move enough to complete the daily kick count (10 movements in 2 hours).  Your baby moves much less often than on the days before or you have not  felt your baby move all day.  Please review the MEDICATION LIST section of your AFTER VISIT SUMMARY document.  Take your medication as prescribed

## 2022-12-12 VITALS
TEMPERATURE: 97 F | OXYGEN SATURATION: 92 % | RESPIRATION RATE: 16 BRPM | HEART RATE: 81 BPM | BODY MASS INDEX: 36.82 KG/M2 | HEIGHT: 65 IN | DIASTOLIC BLOOD PRESSURE: 50 MMHG | SYSTOLIC BLOOD PRESSURE: 96 MMHG | WEIGHT: 221 LBS

## 2022-12-12 PROBLEM — K80.20: Status: ACTIVE | Noted: 2022-12-12

## 2022-12-12 PROBLEM — Z3A.24 24 WEEKS GESTATION OF PREGNANCY: Status: RESOLVED | Noted: 2022-12-11 | Resolved: 2022-12-12

## 2022-12-12 PROBLEM — O99.612: Status: ACTIVE | Noted: 2022-12-12

## 2022-12-12 LAB — PATHOLOGY CONSULT NOTE: NORMAL

## 2022-12-12 PROCEDURE — G0378 HOSPITAL OBSERVATION PER HR: HCPCS

## 2022-12-12 PROCEDURE — A9270 NON-COVERED ITEM OR SERVICE: HCPCS | Performed by: OBSTETRICS & GYNECOLOGY

## 2022-12-12 PROCEDURE — 99225 PR SUBSEQUENT OBSERVATION CARE,LEVEL II: CPT | Performed by: OBSTETRICS & GYNECOLOGY

## 2022-12-12 PROCEDURE — 700111 HCHG RX REV CODE 636 W/ 250 OVERRIDE (IP): Performed by: ADVANCED PRACTICE MIDWIFE

## 2022-12-12 PROCEDURE — 700111 HCHG RX REV CODE 636 W/ 250 OVERRIDE (IP): Performed by: OBSTETRICS & GYNECOLOGY

## 2022-12-12 PROCEDURE — 99024 POSTOP FOLLOW-UP VISIT: CPT | Performed by: NURSE PRACTITIONER

## 2022-12-12 PROCEDURE — 700102 HCHG RX REV CODE 250 W/ 637 OVERRIDE(OP): Performed by: OBSTETRICS & GYNECOLOGY

## 2022-12-12 PROCEDURE — A9270 NON-COVERED ITEM OR SERVICE: HCPCS | Performed by: ADVANCED PRACTICE MIDWIFE

## 2022-12-12 PROCEDURE — 96375 TX/PRO/DX INJ NEW DRUG ADDON: CPT

## 2022-12-12 PROCEDURE — 99217 PR OBSERVATION CARE DISCHARGE: CPT | Performed by: OBSTETRICS & GYNECOLOGY

## 2022-12-12 PROCEDURE — 700102 HCHG RX REV CODE 250 W/ 637 OVERRIDE(OP): Performed by: ADVANCED PRACTICE MIDWIFE

## 2022-12-12 RX ORDER — SIMETHICONE 125 MG
125 TABLET,CHEWABLE ORAL 3 TIMES DAILY PRN
Status: DISCONTINUED | OUTPATIENT
Start: 2022-12-12 | End: 2022-12-12 | Stop reason: HOSPADM

## 2022-12-12 RX ORDER — KETOROLAC TROMETHAMINE 30 MG/ML
30 INJECTION, SOLUTION INTRAMUSCULAR; INTRAVENOUS ONCE
Status: COMPLETED | OUTPATIENT
Start: 2022-12-12 | End: 2022-12-12

## 2022-12-12 RX ORDER — OXYCODONE HYDROCHLORIDE 5 MG/1
5 TABLET ORAL EVERY 4 HOURS PRN
Qty: 12 TABLET | Refills: 0 | Status: SHIPPED | OUTPATIENT
Start: 2022-12-12 | End: 2022-12-14

## 2022-12-12 RX ORDER — ACETAMINOPHEN 500 MG
1000 TABLET ORAL EVERY 6 HOURS PRN
Qty: 30 TABLET | Refills: 0 | Status: SHIPPED | OUTPATIENT
Start: 2022-12-12 | End: 2023-01-31

## 2022-12-12 RX ADMIN — OXYCODONE 10 MG: 5 TABLET ORAL at 09:32

## 2022-12-12 RX ADMIN — OXYCODONE 10 MG: 5 TABLET ORAL at 05:16

## 2022-12-12 RX ADMIN — ACETAMINOPHEN 1000 MG: 500 TABLET ORAL at 15:17

## 2022-12-12 RX ADMIN — ACETAMINOPHEN 1000 MG: 500 TABLET ORAL at 03:14

## 2022-12-12 RX ADMIN — OXYCODONE 10 MG: 5 TABLET ORAL at 00:30

## 2022-12-12 RX ADMIN — SIMETHICONE 125 MG: 125 TABLET, CHEWABLE ORAL at 14:16

## 2022-12-12 RX ADMIN — ACETAMINOPHEN 1000 MG: 500 TABLET ORAL at 09:32

## 2022-12-12 RX ADMIN — FAMOTIDINE 20 MG: 10 INJECTION INTRAVENOUS at 05:17

## 2022-12-12 RX ADMIN — KETOROLAC TROMETHAMINE 30 MG: 30 INJECTION, SOLUTION INTRAMUSCULAR; INTRAVENOUS at 15:16

## 2022-12-12 RX ADMIN — DOCUSATE SODIUM 100 MG: 100 CAPSULE, LIQUID FILLED ORAL at 05:17

## 2022-12-12 ASSESSMENT — LIFESTYLE VARIABLES: EVER_SMOKED: NEVER

## 2022-12-12 ASSESSMENT — PAIN DESCRIPTION - PAIN TYPE: TYPE: ACUTE PAIN

## 2022-12-12 NOTE — PROGRESS NOTES
0700: Received report from Pavithra TAI, plan of care discussed. Plan to doppler baby and send home. RN having difficult time getting doppler on baby, Dr. Parham requested at bedside for ultrasound. MD at bedside, ultrasound, FHR obtained.   RN and MD at bedside to discuss plan of care with the pt. MD gave option to have general surgery come to bedside to discuss possibly having surgery for gallstones. Pt agreed.     1054: Received call from Azalia in Pre Op, report given via [employer]. This RN requesting General surgery MD call OB to discuss plan of care. Plan to monitor before baby before and after surgery. Previous ultrasound from beginning of shift okay for pre surgery monitoring per MD. Doppler sent down to [place] OR with the pt to access if needed during surgery and to obtain doppler after surgery is complete, RN requesting PACU call when pt arrives to PACU so that labor RN can come to bedside and doppler baby. Shahrzad RN, sent down to doppler baby, difficulty getting doppler on baby, Charge RN and Dr. Parham to bedside in PACU to get ultrasound.     1448: Call received from Armida TAI, for report on pt via [address]. Plan for pt to come back up to L&D to [facility].     1508: Pt brought back to floor by transport. Plan of care discussed. Pt on 2L of O2 via nasal cannula.     1830: Plan of care discussed with Dr. Parham, orders received for pain management. Plan to keep overnight and reevaluate in the morning.     1900: Report given to Mamie TAI, plan of care discussed.

## 2022-12-12 NOTE — CARE PLAN
Problem: Knowledge Deficit - L&D  Goal: Patient and family/caregivers will demonstrate understanding of plan of care, disease process/condition, diagnostic tests and medications  12/12/2022 1024 by Diana Hall R.N.  Outcome: Progressing  12/12/2022 1023 by Diana Hall R.N.  Outcome: Progressing     Problem: Psychosocial - L&D  Goal: Patient's level of anxiety will decrease  Outcome: Progressing   The patient is Stable - Low risk of patient condition declining or worsening         Progress made toward(s) clinical / shift goals:  Assesses pain management. Encouraged ambulation.     Patient is not progressing towards the following goals:

## 2022-12-12 NOTE — OP REPORT
DATE OF SERVICE:  12/11/2022     PREOPERATIVE DIAGNOSES:  1.  Cholelithiasis.  2.  Acute cholecystitis.  3.  A 24-week gestational intrauterine pregnancy.     POSTOPERATIVE DIAGNOSES:  1.  Cholelithiasis.  2.  Acute cholecystitis.  3.  A 24-week gestational intrauterine pregnancy.     PROCEDURE:  Laparoscopic cholecystectomy.     SURGEON:  Cynthia Soler MD     ANESTHESIOLOGIST:  Quentin Mayorga MD     ANESTHESIA:  GET.     FINDINGS:  A small gallstones mobile lodged in the proximal cystic duct and   adhesions to the anterior surface of the gallbladder.     COMPLICATIONS:  None.     ESTIMATED BLOOD LOSS:  5 mL     SPECIMENS:  Gallbladder.     DRAINS:  None.     PROPHYLAXIS:  IV Ancef, SCDs.     PROCEDURE IN DETAIL:  The patient was consented preoperatively, taken to the   operating room and placed in supine position.  Anesthesia was induced and she   was endotracheally intubated without difficulty.  Soft roll was placed along   the patient's lower back and flank, marked and put her in a modified   decubitus.  The abdomen was prepped and draped in the usual sterile fashion.    A 4 cm superior to the umbilicus, 2 cm vertical incision was made to place a   Seth port under direct visualization.  The midline linea was elevated with   Kochers while opening into the abdominal cavity.  Seth was placed.  The   abdomen was insufflated to 12 mmHg which the patient tolerated well.  Under   direct visualization, an 11 mm port was placed in the epigastrium, two 5 mm   ports in right upper quadrant.  The patient was then placed in reverse   Trendelenburg.  Gallbladder grasped and retracted cephalad.  Peritoneal   adhesions on the medial and lateral aspect of the gallbladder were incised to   further elevate the gallbladder out of the fossa.  A window was created   posterior to the cystic duct.  The underside of the gallbladder was cleared   off two-thirds the way up the gallbladder fossa and the critical view was    obtained.  Three 10 mm Hem-o-Sam clips were placed proximally on the cystic   duct, one distally, two 10 mm clips placed proximally on the cystic artery.    Both structures were then divided sharply.  Gallbladder was then removed off   the gallbladder fossa achieving hemostasis with cautery.  Gallbladder was   placed in an EndoCatch bag and withdrawn from the abdomen.  The gallbladder   fossa was inspected.  It was dry.  The clips were found to be in good   position.  The gastric port site was closed with an EndoClose using 0 Vicryl.    The 5 mm ports were removed under direct visualization.  There was no   bleeding from the abdominal wall. The supraumbilical fascia was closed with   figure-of-eight using 0 Vicryl.  The skin incisions were closed with   subcuticular stitch using 4-0 Monocryl and Dermabond.  All lap, sponge and   instrument counts were correct at the end of the case x2.  The patient   tolerated the procedure well.  She was awakened from anesthesia, extubated and   taken to the postanesthesia care unit in good condition.        ______________________________  MD DARA Black/IKE    DD:  12/11/2022 14:02  DT:  12/11/2022 16:20    Job#:  [id number]

## 2022-12-12 NOTE — PROGRESS NOTES
Report received from Mamie ESPINAL RN. Pt utilized PRN pain intervention throughout night. Rating pain 7/10 upon administration. FHR noted at 140-150 bpms. Plan to reevaluate pain in AM.

## 2022-12-12 NOTE — PROGRESS NOTES
OB progress note    S:Patient is Post Op day 1 sp cholecystectomy performed by general surgery. She states her pain is 6/10. She is not passing gas. She is hardly ambulating and has used a bed pan to urinate. She had jello last night, no nausea or vomiting. Goof fetal movement and no uterine contractions.     Vitals:    12/12/22 0400   BP: (!) 97/51   Pulse: 70   Resp: 17   Temp: 36.7 °C (98 °F)   SpO2: 95%       Recent Labs     12/11/22  0030   ABOGROUP AB   RUBELLAIGG 33.90   HEPBSAG Non-Reactive   HEPCAB Non-Reactive     Recent Labs     12/11/22  0030   HEMOGLOBIN 11.7*   HEMATOCRIT 36.4*   MCV 74.4*   MCH 23.9*   PLATELETCT 357         Gen: A&Ox3 NAD  CV: no edema or cyanosis  Resp: normal effort  Abd: soft, mild distention, appropriate post op tenderness near incisions, gravid, hypoactive bowel sounds x 4  Ext: no edema or TTP  Psych: normal mood and affect     A/P:  IUP t 24w4d- dopple FHT q shift  POD1 sp cholecystectomy- encouraged ambulation and flatus. Required one dose of IV dilaudid for breakthrough pain through the night. Discussed clear liquids for now until she is able to pass gas. Patient should be able to be discharged home once pain is controlled with oral pain medications and she is ambulating and passing gas.

## 2022-12-12 NOTE — PROGRESS NOTES
0700 - Report received. POC discussed. Dr Parham at bedside to discussed POC. Encouraged ambulation.   1200 - Dr Arizmendi at bedside. Ultrasound performed for fetal cardiac activity.   1700 - Discharge orders received. Pt agreeable with POC.   1730 - Discussed DC instructions with pt. Pt expressed understanding. Instructed when to return to hospital and when to  prescriptions. Pt to call office to schedule hospital follow up.   1740 - Pt wheeled off unit in stable condition with belongings in hand with CNA.

## 2022-12-12 NOTE — PROGRESS NOTES
"  DATE: 12/12/2022    Post Operative Day  1 laparoscopic cholecystectomy.    INTERVAL EVENTS:  Patient is tolerating PO  Port sites are well approximated.  No vomiting     PHYSICAL EXAMINATION:  Vital Signs: /54   Pulse 85   Temp 36.5 °C (97.7 °F) (Temporal)   Resp 16   Ht 1.651 m (5' 5\")   Wt 100 kg (221 lb)   SpO2 93%     Physical Exam  Vitals and nursing note reviewed.   Abdominal:      Comments: Port sites approximated  Abdomen soft.    Musculoskeletal:      Comments: SANTAMARIA X 4   Skin:     General: Skin is warm and dry.      Capillary Refill: Capillary refill takes less than 2 seconds.   Neurological:      Mental Status: She is alert and oriented to person, place, and time.         LABORATORY VALUES:   Recent Labs     12/11/22  0030   WBC 15.0*   RBC 4.89   HEMOGLOBIN 11.7*   HEMATOCRIT 36.4*   MCV 74.4*   MCH 23.9*   MCHC 32.1*   RDW 39.8   PLATELETCT 357   MPV 9.7     Recent Labs     12/11/22  0030   SODIUM 136   POTASSIUM 3.8   CHLORIDE 104   CO2 17*   GLUCOSE 104*   BUN 8   CREATININE 0.58   CALCIUM 9.6     Recent Labs     12/11/22  0030   ASTSGOT 12   ALTSGPT 10   TBILIRUBIN 0.3   ALKPHOSPHAT 124*   GLOBULIN 4.3*            IMAGING:   US-RUQ   Final Result      1. Cholelithiasis.   2. Mild to moderate right hydronephrosis could be pregnancy related.             ASSESSMENT AND PLAN:   Right upper quadrant pain- (present on admission)  Assessment & Plan  1 day RUQ pain.  USD showed cholelithiasis, normal caliber intrahepatic bile ducts, no gallbladder wall thickening.  12/11 To OR for Lap cholecystectomy.  ACS Blue Service.      Surgery will sign off  ACS clinic follow up in 1-2 weeks      ____________________________________     DEBBIE Green.    DD: 12/12/2022  12:43 PM    "

## 2022-12-13 NOTE — DISCHARGE SUMMARY
Discharge Summary:      Joelle Goldberg    Admit Date:   12/10/2022  Discharge Date:  2022     Admitting diagnosis:  Cholelithiasis in pregnancy, 2nd trimester  Discharge Diagnosis: same        History:  Past Medical History:   Diagnosis Date    Anemia affecting pregnancy in third trimester 2020    Anxiety     Pregnant      OB History    Para Term  AB Living   5 3 3   1 3   SAB IAB Ectopic Molar Multiple Live Births   1       0 3      # Outcome Date GA Lbr Howard/2nd Weight Sex Delivery Anes PTL Lv   5 Current            4 Term 20 39w0d  3.395 kg (7 lb 7.8 oz) M CS-LTranv Spinal N OXANA   3 Term 18 39w0d  3.61 kg (7 lb 15.3 oz) M CS-LTranv Spinal N OXANA      Birth Comments: C/Section for repeat   2 SAB 16 18w0d   M SAB  Y FD      Birth Comments: baby passed on its own in [place] at home, but D&C needed   1 Term 03/17/15 40w3d  2.863 kg (6 lb 5 oz) M CS-LTranv EPI N OXANA      Birth Comments: baby's heart rate dropped      Complications: Fetal Intolerance        Patient has no known allergies.  Patient Active Problem List    Diagnosis Date Noted    Cholecystolithiasis affecting pregnancy in second trimester 2022    Previous  section 2022    Right upper quadrant pain 2022        Hospital Course:   22 y.o.  at 24w4d now POD 1 s/p lap feroz for cholelithiasis and possible early cholecystitis.  She was admitted postoperatively for pain control.  She is rashid PO and has ambulated some. She has been resting.  Abd is tender but pain controlled with medications.  Denies flatus. Denies N/V.    Vitals:    22 1010 22 1015 22 1212 22 1612   BP:   105/54 (!) 96/50   Pulse: 83 (!) 101 85 81   Resp:   16 16   Temp:   36.5 °C (97.7 °F) 36.1 °C (97 °F)   TempSrc:   Temporal Temporal   SpO2: 96% 96% 93% 92%   Weight:       Height:           Current Facility-Administered Medications   Medication Dose    simethicone (Mylicon) chewable  tablet 125 mg  125 mg    morphine 4 MG/ML injection 4 mg  4 mg    HYDROcodone-acetaminophen (NORCO) 5-325 MG per tablet 1 Tablet  1 Tablet    lactated ringers infusion      docusate sodium (COLACE) capsule 100 mg  100 mg    acetaminophen (TYLENOL) tablet 1,000 mg  1,000 mg    oxyCODONE immediate-release (ROXICODONE) tablet 5-10 mg  5-10 mg    ondansetron (ZOFRAN) syringe/vial injection 4 mg  4 mg    famotidine (PEPCID) injection 20 mg  20 mg       Exam:  General: NAD  Respiratory: normal effort  Abdomen: sof,t appropr tender, non-distended, non-typmpanic; port sites closed and non-infected  Extremity: extremities, peripheral pulses and reflexes normal     Labs:  Recent Labs     12/11/22  0030   WBC 15.0*   RBC 4.89   HEMOGLOBIN 11.7*   HEMATOCRIT 36.4*   MCV 74.4*   MCH 23.9*   MCHC 32.1*   RDW 39.8   PLATELETCT 357   MPV 9.7        Activity:   Discharge to home  Off work x 1 wk.  Note given  Regular diet. ambulation    Assessment:  POD 1 - appropriate postop course    Follow up: .  General Surgery in 1-2 weeks  [facility] or [facility] in 3-4 weeks for routine OB appt.      Discharge Meds:   Current Outpatient Medications   Medication Sig Dispense Refill    acetaminophen (TYLENOL) 500 MG Tab Take 2 Tablets by mouth every 6 hours as needed for Mild Pain. 30 Tablet 0    oxyCODONE immediate-release (ROXICODONE) 5 MG Tab Take 1 Tablet by mouth every four hours as needed for Severe Pain for up to 2 days. 12 Tablet 0       Linda Arizmendi D.O.

## 2022-12-13 NOTE — CARE PLAN
Problem: Knowledge Deficit - L&D  Goal: Patient and family/caregivers will demonstrate understanding of plan of care, disease process/condition, diagnostic tests and medications  12/12/2022 1730 by Diana Hall R.N.  Outcome: Met  12/12/2022 1024 by Diana Hall R.N.  Outcome: Progressing  12/12/2022 1023 by Diana Hall R.N.  Outcome: Progressing     Problem: Risk for Excess Fluid Volume  Goal: Patient will demonstrate pulse, blood pressure and neurologic signs within expected ranges and without any respiratory complications  Outcome: Met     Problem: Psychosocial - L&D  Goal: Patient's level of anxiety will decrease  12/12/2022 1730 by Diana Hall R.N.  Outcome: Met  12/12/2022 1024 by Diana Hall R.N.  Outcome: Progressing  Goal: Patient will be able to discuss coping skills during hospitalization  Outcome: Met  Goal: Patient's ability to re-evaluate and adapt role responsibilities will improve  Outcome: Met  Goal: Spiritual and cultural needs incorporated into hospitalization  Outcome: Met     Problem: Cardiac Output  Goal: Patient will remain normotensive throughout hospitalization  Outcome: Met     Problem: Pain  Goal: Patient's pain will be alleviated or reduced to the patient’s comfort goal  Outcome: Met     Problem: Risk for Fluid Imbalance  Goal: Patient's fluid volume balance will be maintained or improve  Outcome: Met     Problem: Risk for Infection and Impaired Wound Healing  Goal: Patient will remain free from infection  Outcome: Met  Goal: Patient's wound/surgical incision will decrease in size and heals properly  Outcome: Met     Problem: Risk for Injury  Goal: Patient and fetus will be free of preventable injury/complications  Outcome: Met     Problem: Risk for Venous Thromboembolism (VTE)  Goal: VTE prevention measures will be implemented and patient will remain free from VTE  Outcome: Met     Problem: Discharge Barriers/Planning  Goal:  Patient's continuum of care needs are met  Outcome: Met   The patient is Stable - Low risk of patient condition declining or worsening         Progress made toward(s) clinical / shift goals:      Patient is not progressing towards the following goals:

## 2023-01-13 ENCOUNTER — INITIAL PRENATAL (OUTPATIENT)
Dept: OBGYN | Facility: CLINIC | Age: 23
End: 2023-01-13
Payer: MEDICAID

## 2023-01-13 ENCOUNTER — APPOINTMENT (OUTPATIENT)
Dept: OBGYN | Facility: CLINIC | Age: 23
End: 2023-01-13
Payer: MEDICAID

## 2023-01-13 VITALS — BODY MASS INDEX: 36.44 KG/M2 | WEIGHT: 219 LBS | DIASTOLIC BLOOD PRESSURE: 70 MMHG | SYSTOLIC BLOOD PRESSURE: 118 MMHG

## 2023-01-13 DIAGNOSIS — N76.0 ACUTE VAGINITIS: ICD-10-CM

## 2023-01-13 DIAGNOSIS — Z34.83 ENCOUNTER FOR SUPERVISION OF OTHER NORMAL PREGNANCY, THIRD TRIMESTER: ICD-10-CM

## 2023-01-13 PROCEDURE — 90471 IMMUNIZATION ADMIN: CPT | Performed by: ADVANCED PRACTICE MIDWIFE

## 2023-01-13 PROCEDURE — 90715 TDAP VACCINE 7 YRS/> IM: CPT | Performed by: ADVANCED PRACTICE MIDWIFE

## 2023-01-13 PROCEDURE — 0500F INITIAL PRENATAL CARE VISIT: CPT | Performed by: ADVANCED PRACTICE MIDWIFE

## 2023-01-13 ASSESSMENT — FIBROSIS 4 INDEX: FIB4 SCORE: 0.26

## 2023-01-13 NOTE — LETTER
"Count Your Baby's Movements  Another step to a healthy delivery    Joelle Goldberg             Dept: [phone]    How Many Weeks Pregnant? 29w1d    Date to Begin Countin23              How to use this chart    One way for your physician to keep track of your baby's health is by knowing how often the baby moves (or \"kicks\") in your womb.  You can help your physician to do this by using this chart every day.    Every day, you should see how many hours it takes for your baby to move 10 times.  Start in the morning, as soon as you get up.    · First, write down the time your baby moves until you get to 10.  · Check off one box every time your baby moves until you get to 10.  · Write down the time you finished counting in the last column.  · Total how long it took to count up all 10 movements.  · Finally, fill in the box that shows how long this took.  After counting 10 movements, you no longer have to count any more that day.  The next morning, just start counting again as soon as you get up.    What should you call a \"movement\"?  It is hard to say, because it will feel different from one mother to another and from one pregnancy to the next.  The important thing is that you count the movements the same way throughout your pregnancy.  If you have more questions, you should ask your physician.    Count carefully every day!  SAMPLE:  Week 28    How many hours did it take to feel 10 movements?       Start  Time     1     2     3     4     5     6     7     8     9     10   Finish Time   Mon 8:20 ·  ·  ·  ·  ·  ·  ·  ·  ·  ·  11:40                  Sat               Sun                 IMPORTANT: You should contact your physician if it takes more than two hours for you to feel 10 movements.  Each morning, write down the time and start to count the movements of your baby.  Keep track by checking off one box every time you feel one movement.  When you " "have felt 10 \"kicks\", write down the time you finished counting in the last column.  Then fill in the   box (over the check levi) for the number of hours it took.  Be sure to read the complete instructions on the previous page.            "

## 2023-01-13 NOTE — PROGRESS NOTES
Pt. Here for NOB visit today.  # [phone]  First prenatal care  Pt. States for the past week she been experiencing pelvic pain when she is walking or laying down   Pharmacy verified  AFP, too,late   Chaperone offered and not indicated  Pt given KELLIE sheet and instructions   Pt would like BTL, consent signed  Pt would niya TDAP, consent signed

## 2023-01-13 NOTE — PROGRESS NOTES
Risk factors:   late prenatal care, previous c/s, cholecystectomy during pregnancy  Referrals made today:   none    Subjective:   Joelle Goldberg is a 22 y.o.  who presents for her new OB exam.  She is 29w1d with an SAI of Estimated Date of Delivery: 3/30/23 by US.   She is feeling good and has no concerns at this time. Reports good fetal movement.     Vaginal bleeding no  Pain   no  Cramping  no    Past Medical History:   Diagnosis Date    Anemia affecting pregnancy in third trimester 2020    Anxiety     Pregnant        Psych History   Depression:  no  Anxiety   no  Bipolar   no  Postpartum Mood Changes N/a    Past Surgical History:   Procedure Laterality Date    ALTAF BY LAPAROSCOPY  2022    Procedure: CHOLECYSTECTOMY, LAPAROSCOPIC;  Surgeon: Cynthia Soler M.D.;  Location: SURGERY [facility];  Service: Gen Robotic    REPEAT C SECTION N/A 2020    Procedure:  SECTION, REPEAT;  Surgeon: Gladys Murcia D.O.;  Location: LABOR AND DELIVERY;  Service: Obstetrics    REPEAT C SECTION  3/11/2018    Procedure: REPEAT C SECTION;  Surgeon: Brandy Moreland M.D.;  Location: LABOR AND DELIVERY;  Service: Labor and Delivery    PRIMARY C SECTION  3/17/2015    Performed by Blue Andino M.D. at LABOR AND DELIVERY        OB History    Para Term  AB Living   5 3 3   1 3   SAB IAB Ectopic Molar Multiple Live Births   1       0 3      # Outcome Date GA Lbr Howard/2nd Weight Sex Delivery Anes PTL Lv   5 Current            4 Term 20 39w0d  7 lb 7.8 oz M CS-LTranv Spinal N OXANA   3 Term 18 39w0d  7 lb 15.3 oz M CS-LTranv Spinal N OXANA      Birth Comments: C/Section for repeat   2 SAB 16 18w0d   M SAB  Y FD      Birth Comments: baby passed on its own in [place] at home, but D&C needed   1 Term 03/17/15 40w3d  6 lb 5 oz M CS-LTranv EPI N OXANA      Birth Comments: baby's heart rate dropped      Complications: Fetal Intolerance        Gynecological  History  STIs  no  HSV  no  Abnormal pap No      Family History   Problem Relation Age of Onset    Arthritis Mother     Hyperlipidemia Mother     Hyperlipidemia Father     Diabetes Father     No Known Problems Brother      Denies any genetic disorders in family history.     FOB is involved   Pregnancy is un planned but desired.    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  .   Denies alcohol use, drug use, or tobacco use in pregnancy.     Denies any current or hx of sexual, emotional or physical abuse or trauma.     Current Medications: PNV    Allergies: NKDA      Objective:      Vitals:    23 1037   BP: 118/70   Weight: 219 lb        See Prenatal Flowsheet for vitals    Well nourished female in no acute distress  A& O x 3  Heart RRR  LCTAB  No edema noted and pulses WNL bilaterally in lower extremities; no claudication  BS x 4; no guarding or tenderness    Assessment:      1.  IUP @ 29w1d per US      2.  S=D      3.  See problem list as follows       Patient Active Problem List    Diagnosis Date Noted    Cholecystolithiasis affecting pregnancy in second trimester 2022    Previous  section 2022    Right upper quadrant pain 2022         Plan:   -  vag pathogens swab done today   - Discussed AFP and genetic testing in pregnancy. Patient too late  - Prenatal labs ordered - lab slip provided  - Discussed PNV, nutrition, adequate water intake, and exercise/weight gain in pregnancy  - S/sx of pregnancy warning signs and PTL precautions given  - Complete OB US in 1 wks  - Return to clinic in 2 weeks.

## 2023-01-16 ENCOUNTER — PATIENT MESSAGE (OUTPATIENT)
Dept: OBGYN | Facility: CLINIC | Age: 23
End: 2023-01-16
Payer: MEDICAID

## 2023-01-16 DIAGNOSIS — N76.0 ACUTE VAGINITIS: ICD-10-CM

## 2023-01-16 RX ORDER — METRONIDAZOLE 500 MG/1
500 TABLET ORAL 2 TIMES DAILY
Qty: 14 TABLET | Refills: 0 | Status: SHIPPED | OUTPATIENT
Start: 2023-01-16 | End: 2023-02-28

## 2023-01-31 ENCOUNTER — ROUTINE PRENATAL (OUTPATIENT)
Dept: OBGYN | Facility: CLINIC | Age: 23
End: 2023-01-31
Payer: MEDICAID

## 2023-01-31 VITALS — SYSTOLIC BLOOD PRESSURE: 106 MMHG | WEIGHT: 219.2 LBS | DIASTOLIC BLOOD PRESSURE: 70 MMHG | BODY MASS INDEX: 36.48 KG/M2

## 2023-01-31 DIAGNOSIS — Z98.891 HISTORY OF CESAREAN DELIVERY: ICD-10-CM

## 2023-01-31 DIAGNOSIS — O09.33 LATE PRENATAL CARE AFFECTING PREGNANCY IN THIRD TRIMESTER: ICD-10-CM

## 2023-01-31 PROBLEM — O09.30 LATE PRENATAL CARE AFFECTING PREGNANCY: Status: ACTIVE | Noted: 2023-01-31

## 2023-01-31 PROCEDURE — 90471 IMMUNIZATION ADMIN: CPT | Performed by: NURSE PRACTITIONER

## 2023-01-31 PROCEDURE — 90686 IIV4 VACC NO PRSV 0.5 ML IM: CPT | Performed by: NURSE PRACTITIONER

## 2023-01-31 PROCEDURE — 0502F SUBSEQUENT PRENATAL CARE: CPT | Performed by: NURSE PRACTITIONER

## 2023-01-31 ASSESSMENT — FIBROSIS 4 INDEX: FIB4 SCORE: 0.26

## 2023-01-31 NOTE — PROGRESS NOTES
Pt. Here for OB/FU. Reports Good FM.   Pt. Denies VB, LOF, or UC's.   Pt states she wears a pad sometimes due to feeling like she leaks a little bit of fluid throughout the day.   Plans to get labs done on Thursday 2/2/23 as well as US.   Pt states she is still feeling itchy in her vaginal area and reports redness as well. Per pt she completed the medication prescribed to her by Yemi for the VB that came back + at last visit.

## 2023-01-31 NOTE — PROGRESS NOTES
SUBJECTIVE:  Pt is a 22 y.o.   at 31w5d  gestation. Presents today for follow-up prenatal care. Reports no issues at this time.  Reports good  fetal movement. Denies regular cramping/contractions, bleeding or leaking of fluid. Denies dysuria, headaches, N/V. Generally feels well today except still some irritation/itch in her vagina. She finished her treatment with terazol and metronidazole on Sat and symptoms were better. She is wearing a pad for d/c and only washing with warm water.      OBJECTIVE:  - See prenatal vitals flow  -   Vitals:    23 0809   BP: 106/70   Weight: 219 lb 3.2 oz                 ASSESSMENT:   - IUP at 31w5d    - S=D   -   Patient Active Problem List    Diagnosis Date Noted    History of  delivery x 3 2023    Late prenatal care affecting pregnancy 2023    Cholecystolithiasis affecting pregnancy in second trimester 2022         PLAN:  - S/sx pregnancy and labor warning signs vs general discomforts discussed  - Fetal movements and/or kick counts reviewed   - Adequate hydration reinforced  - Nutrition/exercise/vitamin education; continue PNV  - US scheduled  - GCT still to be done  - Reviewed trying to dry out vaginal environment and letting things stabilize the next few days, can try monistat for 7 nights if symptoms come back    - S/p flu vacc today   - Reviewed COVID-19 vaccination recommendations: pt reports she may think about the booster  - Anticipatory guidance given  - RTC in 2 weeks for follow-up prenatal care

## 2023-02-07 ENCOUNTER — HOSPITAL ENCOUNTER (OUTPATIENT)
Dept: RADIOLOGY | Facility: MEDICAL CENTER | Age: 23
End: 2023-02-07
Attending: ADVANCED PRACTICE MIDWIFE
Payer: MEDICAID

## 2023-02-07 DIAGNOSIS — Z34.83 ENCOUNTER FOR SUPERVISION OF OTHER NORMAL PREGNANCY, THIRD TRIMESTER: ICD-10-CM

## 2023-02-07 PROCEDURE — 76805 OB US >/= 14 WKS SNGL FETUS: CPT

## 2023-02-14 ENCOUNTER — ROUTINE PRENATAL (OUTPATIENT)
Dept: OBGYN | Facility: CLINIC | Age: 23
End: 2023-02-14
Payer: MEDICAID

## 2023-02-14 ENCOUNTER — APPOINTMENT (OUTPATIENT)
Dept: OBGYN | Facility: CLINIC | Age: 23
End: 2023-02-14
Payer: MEDICAID

## 2023-02-14 VITALS — BODY MASS INDEX: 37.44 KG/M2 | WEIGHT: 225 LBS | DIASTOLIC BLOOD PRESSURE: 68 MMHG | SYSTOLIC BLOOD PRESSURE: 100 MMHG

## 2023-02-14 DIAGNOSIS — Z3A.33 PREGNANCY WITH 33 COMPLETED WEEKS GESTATION: ICD-10-CM

## 2023-02-14 DIAGNOSIS — Z98.891 HISTORY OF CESAREAN DELIVERY: ICD-10-CM

## 2023-02-14 DIAGNOSIS — N89.8 VAGINAL DISCHARGE: ICD-10-CM

## 2023-02-14 PROCEDURE — 0502F SUBSEQUENT PRENATAL CARE: CPT | Performed by: OBSTETRICS & GYNECOLOGY

## 2023-02-14 ASSESSMENT — FIBROSIS 4 INDEX: FIB4 SCORE: 0.26

## 2023-02-14 NOTE — PROGRESS NOTES
"Pt. Here for OB/FU. Reports Good FM.   Pt states if she does not eat she does not feel baby move as much.  Pt. Denies VB, LOF, or UC's.   Pt states she is still having discharge but she thinks it is from the metronidazole medication she took for her yeast infection/BV. Also states she is having vaginal itchiness here and there.   Pt state she feels \"sore\" in here thigh area that goes to the back of her knee.   Plans to get labs done tomorrow.   BTL offered and consent signed. (In media)    "

## 2023-02-14 NOTE — PROGRESS NOTES
Chief complaint: Return OB visit    S: Pt presents for routine OB follow up. Good fetal movement.  No contractions, vaginal bleeding, or leakage of fluid.    Questions answered.    Patient was diagnosed with bacterial vaginosis and yeast approximately 1 month ago.  Continues to report significant vaginal discharge and some redness.    O: /68   Wt 225 lb   LMP 2022   BMI 37.44 kg/m²   Patients' weight gain, fluid intake and exercise level discussed.  Vitals, fundal height , fetal position, and FHR reviewed on flowsheet    Pelvic: Slight erythema around vaginal opening.  Vaginal swab obtained.  Will call patient with results    Lab:No results found for this or any previous visit (from the past 336 hour(s)).    A/P:  22 y.o.  at 33w5d presents for routine obstetric follow-up.  Size equals dates and/or scan    1.  Continue prenatal vitamins.  2.  Fetal kick counts.  3.  Exercise at least 30 minutes daily.  4.  Drink at least 2L of water daily  5.  Labor precautions educated.  6.  Follow-up in 2 weeks.  7.  GBS at 36 weeks    As far as vaginal rotation is concerned, discussed with patient that if it is either bacterial vaginosis or yeast once again, that the possibility of longer treatment with the possible addition of prophylaxis may help control her symptoms.    We will call patient with results

## 2023-02-22 ENCOUNTER — TELEPHONE (OUTPATIENT)
Dept: OBGYN | Facility: CLINIC | Age: 23
End: 2023-02-22

## 2023-02-22 NOTE — TELEPHONE ENCOUNTER
Pt called stating may have BV.   Informed patient she may use monistat OTC 7 days at night.   Pt understood

## 2023-02-28 ENCOUNTER — ROUTINE PRENATAL (OUTPATIENT)
Dept: OBGYN | Facility: CLINIC | Age: 23
End: 2023-02-28
Payer: MEDICAID

## 2023-02-28 VITALS — SYSTOLIC BLOOD PRESSURE: 104 MMHG | BODY MASS INDEX: 37.77 KG/M2 | DIASTOLIC BLOOD PRESSURE: 68 MMHG | WEIGHT: 227 LBS

## 2023-02-28 DIAGNOSIS — Z34.83 ENCOUNTER FOR SUPERVISION OF OTHER NORMAL PREGNANCY, THIRD TRIMESTER: ICD-10-CM

## 2023-02-28 DIAGNOSIS — N76.0 ACUTE VAGINITIS: ICD-10-CM

## 2023-02-28 PROBLEM — Z3A.33 PREGNANCY WITH 33 COMPLETED WEEKS GESTATION: Status: RESOLVED | Noted: 2023-02-14 | Resolved: 2023-02-28

## 2023-02-28 PROCEDURE — 0502F SUBSEQUENT PRENATAL CARE: CPT | Performed by: ADVANCED PRACTICE MIDWIFE

## 2023-02-28 ASSESSMENT — FIBROSIS 4 INDEX: FIB4 SCORE: 0.27

## 2023-02-28 NOTE — PROGRESS NOTES
Patient here for CHRIS visit at 35w5d of pregnancy. She affirms fetal movement, denies vaginal bleeding or cramping/regular contractions. She reports overall today she is feeling well and without complaints. No recent ER visits since last seen. Patient inquiring about potential for DNA testing. She continues to have vaginal discharge. Last swab was not processed. Resend today but likely bacterial vaginosis. Will check for STIs as well. Adequate hydration reviewed in detail with patient. Precautions and warning signs reviewed with patient.  RTC in 1 week(s) for CHRIS visit.     Request for repeat  section on 3/23 with Dr. Lancaster sent.

## 2023-02-28 NOTE — PROGRESS NOTES
Pt here today for OB follow up  Pt states she is feeling tired   Reports +FM  Good # [phone]  Pharmacy Confirmed.  Chaperone offered and not indicated.   Pt states she will do labs by Friday

## 2023-03-03 ENCOUNTER — NON-PROVIDER VISIT (OUTPATIENT)
Dept: OBGYN | Facility: CLINIC | Age: 23
End: 2023-03-03
Payer: MEDICAID

## 2023-03-03 ENCOUNTER — ROUTINE PRENATAL (OUTPATIENT)
Dept: OBGYN | Facility: CLINIC | Age: 23
End: 2023-03-03
Payer: MEDICAID

## 2023-03-03 VITALS — WEIGHT: 227.8 LBS | SYSTOLIC BLOOD PRESSURE: 121 MMHG | BODY MASS INDEX: 37.91 KG/M2 | DIASTOLIC BLOOD PRESSURE: 62 MMHG

## 2023-03-03 DIAGNOSIS — N76.0 ACUTE VAGINITIS: ICD-10-CM

## 2023-03-03 DIAGNOSIS — O36.8130 DECREASED FETAL MOVEMENTS IN THIRD TRIMESTER, SINGLE OR UNSPECIFIED FETUS: ICD-10-CM

## 2023-03-03 DIAGNOSIS — Z98.891 HISTORY OF CESAREAN DELIVERY: ICD-10-CM

## 2023-03-03 DIAGNOSIS — O09.93 SUPERVISION OF HIGH-RISK PREGNANCY, THIRD TRIMESTER: ICD-10-CM

## 2023-03-03 LAB
NST ACOUSTIC STIMULATION: NORMAL
NST ACTION NECESSARY: NORMAL
NST ASSESSMENT: NORMAL
NST BASELINE: NORMAL
NST INDICATIONS: NORMAL
NST OTHER DATA: NORMAL
NST READ BY: NORMAL
NST RETURN: NORMAL
NST UTERINE ACTIVITY: NORMAL

## 2023-03-03 PROCEDURE — 0502F SUBSEQUENT PRENATAL CARE: CPT

## 2023-03-03 RX ORDER — FLUCONAZOLE 150 MG/1
TABLET ORAL
Qty: 2 TABLET | Refills: 0 | Status: ON HOLD | OUTPATIENT
Start: 2023-03-03 | End: 2023-03-24

## 2023-03-03 ASSESSMENT — FIBROSIS 4 INDEX: FIB4 SCORE: 0.27

## 2023-03-03 NOTE — PROGRESS NOTES
Pt here today for OB follow up with NST  GBS to be done today  Reports decreased FM  Pt states the last time she felt bay moved was yesterday at noon.   WT: 227.8 lb   BP: 121/62  Preferred pharmacy verified with pt.  MFM Appointment: not at this time  Pt states she has been feeling belly tightness when she is eating. Denies vaginal bleeding, UC's or LOF.   Good # [phone]  Las orders reprinted for pt to get blood work done as soon as possible  C/section scheduled for 03/24/2023 @ 12:00 pm. Pre Op on 03/14/23 @ 10:15 am with Dr. Rico. C/Section check on 04/07/2023 @ 9:00 am. Pt notified and instructions given today

## 2023-03-03 NOTE — PROGRESS NOTES
S:  Joelle here for same day appt for decreased fetal movement since yesterday evening. Reports good FM now but had not felt anything all morning.  Denies VB, LOF, RUCs. Still having intermittent yellowish/green discharge, feels itchy and uncomfortable. Has had vaginal pathogens done x2 but not run by "[facility]. Desires recollection today. Aware she needs to collect some outstanding prenatal labs - reprinted and advised to collect ASAP.     O:    Vitals:    23 1134   BP: 121/62   Weight: 227 lb 12.8 oz     See flow sheet.    Non Stress Test  Joelle Goldberg is a 23 y.o.  at 36w1d who presents today for NST for decreased fetal movement since yesterday.   NST Read by: Kaitlyn Becerril C.N.M.  Baseline: 130  Variability: moderate  Accels (>2 in 20 min): yes  Decels: none  Notes: No uterine activity noted on TOCO  Category: 1    A:    IUP at 36w1d  S=D  Reactive NST, category 1    Patient Active Problem List    Diagnosis Date Noted    History of  delivery x 3 2023    Late prenatal care affecting pregnancy 2023    Cholecystolithiasis affecting pregnancy in second trimester 2022       P:  1.  Rectovaginal GBS obtained. Reviewed plan for labor prophylaxis if positive.         2.  Vaginal pathogens obtained, sent to "[facility]          3.  Questions answered. Anticipatory guidance.         4.  Continue FKCs. Reviewed the urgency of being seen for absent fetal movement, discussed ED visit vs office visit.         5.  Encouraged adequate water intake        6.  F/u 1 wk and PRN    Orders Placed This Encounter    GRP B STREP, BY PCR (JULIAN BROTH)    VAGINAL PATHOGENS DNA PANEL    POCT Fetal Nonstress Test       Kaitlyn Becerril C.N.M.

## 2023-03-06 DIAGNOSIS — O09.93 SUPERVISION OF HIGH-RISK PREGNANCY, THIRD TRIMESTER: ICD-10-CM

## 2023-03-06 PROBLEM — O98.819 GROUP B STREPTOCOCCAL INFECTION IN MOTHER DURING PREGNANCY: Status: ACTIVE | Noted: 2023-03-06

## 2023-03-06 PROBLEM — B95.1 GROUP B STREPTOCOCCAL INFECTION IN MOTHER DURING PREGNANCY: Status: ACTIVE | Noted: 2023-03-06

## 2023-03-07 ENCOUNTER — ROUTINE PRENATAL (OUTPATIENT)
Dept: OBGYN | Facility: CLINIC | Age: 23
End: 2023-03-07
Payer: MEDICAID

## 2023-03-07 VITALS — DIASTOLIC BLOOD PRESSURE: 72 MMHG | WEIGHT: 228 LBS | SYSTOLIC BLOOD PRESSURE: 112 MMHG | BODY MASS INDEX: 37.94 KG/M2

## 2023-03-07 DIAGNOSIS — Z34.83 ENCOUNTER FOR SUPERVISION OF OTHER NORMAL PREGNANCY, THIRD TRIMESTER: ICD-10-CM

## 2023-03-07 PROCEDURE — 0502F SUBSEQUENT PRENATAL CARE: CPT | Performed by: ADVANCED PRACTICE MIDWIFE

## 2023-03-07 ASSESSMENT — FIBROSIS 4 INDEX: FIB4 SCORE: 0.27

## 2023-03-07 NOTE — PROGRESS NOTES
Pt here today for OB follow up  Pt states she is still experiencing vaginal itching   Reports +FM  Good # [phone]  Pharmacy Confirmed.  Chaperone offered and not indicated.   GBS positive   Pt schedule for repeat  on 3/24/23 @ 1200pm with Dr. Rico. Pt aware of date and time.

## 2023-03-07 NOTE — PROGRESS NOTES
Patient here for CHRIS visit at 36w5d of pregnancy. She affirms fetal movement, denies vaginal bleeding or cramping/regular contractions. She reports overall today she is feeling well and without complaints. No recent ER visits since last seen. Still with some itching at this time, has repeat diflucan tab to take tomorrow.  Adequate hydration reviewed in detail with patient. Precautions and warning signs reviewed with patient.  RTC in 1 week(s) for CHRIS visit.     Preop is scheduled with Dr. Rico and ROCIO with BTL on 3/24

## 2023-03-13 ENCOUNTER — PRE-ADMISSION TESTING (OUTPATIENT)
Dept: ADMISSIONS | Facility: MEDICAL CENTER | Age: 23
End: 2023-03-13
Attending: OBSTETRICS & GYNECOLOGY
Payer: MEDICAID

## 2023-03-14 ENCOUNTER — ROUTINE PRENATAL (OUTPATIENT)
Dept: OBGYN | Facility: CLINIC | Age: 23
End: 2023-03-14
Payer: MEDICAID

## 2023-03-14 VITALS — BODY MASS INDEX: 38.27 KG/M2 | WEIGHT: 230 LBS | SYSTOLIC BLOOD PRESSURE: 120 MMHG | DIASTOLIC BLOOD PRESSURE: 74 MMHG

## 2023-03-14 DIAGNOSIS — Z30.09 STERILIZATION CONSULT: ICD-10-CM

## 2023-03-14 DIAGNOSIS — Z34.83 SUPERVISION OF NORMAL INTRAUTERINE PREGNANCY IN MULTIGRAVIDA IN THIRD TRIMESTER: ICD-10-CM

## 2023-03-14 DIAGNOSIS — Z98.891 HISTORY OF CESAREAN DELIVERY: ICD-10-CM

## 2023-03-14 PROCEDURE — 0502F SUBSEQUENT PRENATAL CARE: CPT | Performed by: OBSTETRICS & GYNECOLOGY

## 2023-03-14 ASSESSMENT — FIBROSIS 4 INDEX: FIB4 SCORE: 0.27

## 2023-03-15 ENCOUNTER — TELEPHONE (OUTPATIENT)
Dept: OBGYN | Facility: CLINIC | Age: 23
End: 2023-03-15
Payer: MEDICAID

## 2023-03-15 DIAGNOSIS — Z98.891 HISTORY OF CESAREAN DELIVERY: ICD-10-CM

## 2023-03-17 ENCOUNTER — TELEPHONE (OUTPATIENT)
Dept: OBGYN | Facility: CLINIC | Age: 23
End: 2023-03-17
Payer: MEDICAID

## 2023-03-17 NOTE — TELEPHONE ENCOUNTER
----- Message from Diane Sierra C.N.M. sent at 3/17/2023  1:06 AM PDT -----  Please call with results. She is mildly anemic, and needs to start daily iron supplementation.    3/17/23 : Called pt and made her aware that she is slightly anemic. Explained she can take OTC iron supplementation pills to increase her iron. Pt understood instructions. Told her to call us if she had any questions.

## 2023-03-24 ENCOUNTER — ANESTHESIA EVENT (OUTPATIENT)
Dept: OBGYN | Facility: MEDICAL CENTER | Age: 23
End: 2023-03-24
Payer: MEDICAID

## 2023-03-24 ENCOUNTER — HOSPITAL ENCOUNTER (INPATIENT)
Facility: MEDICAL CENTER | Age: 23
LOS: 3 days | End: 2023-03-27
Attending: OBSTETRICS & GYNECOLOGY | Admitting: OBSTETRICS & GYNECOLOGY
Payer: MEDICAID

## 2023-03-24 ENCOUNTER — ANESTHESIA (OUTPATIENT)
Dept: OBGYN | Facility: MEDICAL CENTER | Age: 23
End: 2023-03-24
Payer: MEDICAID

## 2023-03-24 DIAGNOSIS — G89.18 ACUTE POSTOPERATIVE PAIN: ICD-10-CM

## 2023-03-24 LAB
ABO GROUP BLD: NORMAL
BASOPHILS # BLD AUTO: 0.3 % (ref 0–1.8)
BASOPHILS # BLD: 0.03 K/UL (ref 0–0.12)
BLD GP AB SCN SERPL QL: NORMAL
EOSINOPHIL # BLD AUTO: 0.03 K/UL (ref 0–0.51)
EOSINOPHIL NFR BLD: 0.3 % (ref 0–6.9)
ERYTHROCYTE [DISTWIDTH] IN BLOOD BY AUTOMATED COUNT: 43.6 FL (ref 35.9–50)
HCT VFR BLD AUTO: 35 % (ref 37–47)
HGB BLD-MCNC: 11.1 G/DL (ref 12–16)
IMM GRANULOCYTES # BLD AUTO: 0.05 K/UL (ref 0–0.11)
IMM GRANULOCYTES NFR BLD AUTO: 0.5 % (ref 0–0.9)
LYMPHOCYTES # BLD AUTO: 2.65 K/UL (ref 1–4.8)
LYMPHOCYTES NFR BLD: 27.5 % (ref 22–41)
MCH RBC QN AUTO: 22.3 PG (ref 27–33)
MCHC RBC AUTO-ENTMCNC: 31.7 G/DL (ref 33.6–35)
MCV RBC AUTO: 70.4 FL (ref 81.4–97.8)
MONOCYTES # BLD AUTO: 0.75 K/UL (ref 0–0.85)
MONOCYTES NFR BLD AUTO: 7.8 % (ref 0–13.4)
NEUTROPHILS # BLD AUTO: 6.13 K/UL (ref 2–7.15)
NEUTROPHILS NFR BLD: 63.6 % (ref 44–72)
NRBC # BLD AUTO: 0 K/UL
NRBC BLD-RTO: 0 /100 WBC
PATHOLOGY CONSULT NOTE: NORMAL
PLATELET # BLD AUTO: 281 K/UL (ref 164–446)
PMV BLD AUTO: 10.2 FL (ref 9–12.9)
RBC # BLD AUTO: 4.97 M/UL (ref 4.2–5.4)
RH BLD: NORMAL
T PALLIDUM AB SER QL IA: NORMAL
WBC # BLD AUTO: 9.6 K/UL (ref 4.8–10.8)

## 2023-03-24 PROCEDURE — 86901 BLOOD TYPING SEROLOGIC RH(D): CPT

## 2023-03-24 PROCEDURE — 01961 ANES CESAREAN DELIVERY ONLY: CPT | Performed by: EMERGENCY MEDICINE

## 2023-03-24 PROCEDURE — 36415 COLL VENOUS BLD VENIPUNCTURE: CPT

## 2023-03-24 PROCEDURE — 86900 BLOOD TYPING SEROLOGIC ABO: CPT

## 2023-03-24 PROCEDURE — 700105 HCHG RX REV CODE 258: Performed by: EMERGENCY MEDICINE

## 2023-03-24 PROCEDURE — A9270 NON-COVERED ITEM OR SERVICE: HCPCS | Performed by: EMERGENCY MEDICINE

## 2023-03-24 PROCEDURE — 770002 HCHG ROOM/CARE - OB PRIVATE (112)

## 2023-03-24 PROCEDURE — 86780 TREPONEMA PALLIDUM: CPT

## 2023-03-24 PROCEDURE — 700111 HCHG RX REV CODE 636 W/ 250 OVERRIDE (IP): Performed by: EMERGENCY MEDICINE

## 2023-03-24 PROCEDURE — 160009 HCHG ANES TIME/MIN: Performed by: OBSTETRICS & GYNECOLOGY

## 2023-03-24 PROCEDURE — 88302 TISSUE EXAM BY PATHOLOGIST: CPT | Mod: 59

## 2023-03-24 PROCEDURE — 160002 HCHG RECOVERY MINUTES (STAT): Performed by: OBSTETRICS & GYNECOLOGY

## 2023-03-24 PROCEDURE — 700102 HCHG RX REV CODE 250 W/ 637 OVERRIDE(OP): Performed by: EMERGENCY MEDICINE

## 2023-03-24 PROCEDURE — 59510 CESAREAN DELIVERY: CPT | Performed by: OBSTETRICS & GYNECOLOGY

## 2023-03-24 PROCEDURE — C1755 CATHETER, INTRASPINAL: HCPCS | Performed by: OBSTETRICS & GYNECOLOGY

## 2023-03-24 PROCEDURE — 700101 HCHG RX REV CODE 250: Performed by: EMERGENCY MEDICINE

## 2023-03-24 PROCEDURE — 700111 HCHG RX REV CODE 636 W/ 250 OVERRIDE (IP): Performed by: OBSTETRICS & GYNECOLOGY

## 2023-03-24 PROCEDURE — 58611 LIGATE OVIDUCT(S) ADD-ON: CPT | Performed by: OBSTETRICS & GYNECOLOGY

## 2023-03-24 PROCEDURE — 0UB70ZZ EXCISION OF BILATERAL FALLOPIAN TUBES, OPEN APPROACH: ICD-10-PCS | Performed by: OBSTETRICS & GYNECOLOGY

## 2023-03-24 PROCEDURE — 160048 HCHG OR STATISTICAL LEVEL 1-5: Performed by: OBSTETRICS & GYNECOLOGY

## 2023-03-24 PROCEDURE — 160035 HCHG PACU - 1ST 60 MINS PHASE I: Performed by: OBSTETRICS & GYNECOLOGY

## 2023-03-24 PROCEDURE — 160029 HCHG SURGERY MINUTES - 1ST 30 MINS LEVEL 4: Performed by: OBSTETRICS & GYNECOLOGY

## 2023-03-24 PROCEDURE — 160041 HCHG SURGERY MINUTES - EA ADDL 1 MIN LEVEL 4: Performed by: OBSTETRICS & GYNECOLOGY

## 2023-03-24 PROCEDURE — 86850 RBC ANTIBODY SCREEN: CPT

## 2023-03-24 PROCEDURE — 85025 COMPLETE CBC W/AUTO DIFF WBC: CPT

## 2023-03-24 RX ORDER — BISACODYL 10 MG
10 SUPPOSITORY, RECTAL RECTAL PRN
Status: DISCONTINUED | OUTPATIENT
Start: 2023-03-24 | End: 2023-03-27 | Stop reason: HOSPADM

## 2023-03-24 RX ORDER — MORPHINE SULFATE 0.5 MG/ML
INJECTION, SOLUTION EPIDURAL; INTRATHECAL; INTRAVENOUS
Status: COMPLETED | OUTPATIENT
Start: 2023-03-24 | End: 2023-03-24

## 2023-03-24 RX ORDER — ONDANSETRON 2 MG/ML
4 INJECTION INTRAMUSCULAR; INTRAVENOUS EVERY 6 HOURS PRN
Status: ACTIVE | OUTPATIENT
Start: 2023-03-24 | End: 2023-03-25

## 2023-03-24 RX ORDER — ACETAMINOPHEN 500 MG
1000 TABLET ORAL EVERY 6 HOURS
Status: COMPLETED | OUTPATIENT
Start: 2023-03-24 | End: 2023-03-25

## 2023-03-24 RX ORDER — NIFEDIPINE 10 MG/1
10 CAPSULE ORAL
Status: DISCONTINUED | OUTPATIENT
Start: 2023-03-24 | End: 2023-03-27 | Stop reason: HOSPADM

## 2023-03-24 RX ORDER — EPHEDRINE SULFATE 50 MG/ML
5 INJECTION, SOLUTION INTRAVENOUS
Status: CANCELLED | OUTPATIENT
Start: 2023-03-24

## 2023-03-24 RX ORDER — IBUPROFEN 800 MG/1
800 TABLET ORAL EVERY 8 HOURS
Status: DISCONTINUED | OUTPATIENT
Start: 2023-03-25 | End: 2023-03-27 | Stop reason: HOSPADM

## 2023-03-24 RX ORDER — VITAMIN A ACETATE, BETA CAROTENE, ASCORBIC ACID, CHOLECALCIFEROL, .ALPHA.-TOCOPHEROL ACETATE, DL-, THIAMINE MONONITRATE, RIBOFLAVIN, NIACINAMIDE, PYRIDOXINE HYDROCHLORIDE, FOLIC ACID, CYANOCOBALAMIN, CALCIUM CARBONATE, FERROUS FUMARATE, ZINC OXIDE, CUPRIC OXIDE 3080; 12; 120; 400; 1; 1.84; 3; 20; 22; 920; 25; 200; 27; 10; 2 [IU]/1; UG/1; MG/1; [IU]/1; MG/1; MG/1; MG/1; MG/1; MG/1; [IU]/1; MG/1; MG/1; MG/1; MG/1; MG/1
1 TABLET, FILM COATED ORAL
Status: DISCONTINUED | OUTPATIENT
Start: 2023-03-25 | End: 2023-03-27 | Stop reason: HOSPADM

## 2023-03-24 RX ORDER — HYDROMORPHONE HYDROCHLORIDE 1 MG/ML
0.2 INJECTION, SOLUTION INTRAMUSCULAR; INTRAVENOUS; SUBCUTANEOUS
Status: ACTIVE | OUTPATIENT
Start: 2023-03-24 | End: 2023-03-25

## 2023-03-24 RX ORDER — SODIUM CHLORIDE, SODIUM LACTATE, POTASSIUM CHLORIDE, AND CALCIUM CHLORIDE .6; .31; .03; .02 G/100ML; G/100ML; G/100ML; G/100ML
500 INJECTION, SOLUTION INTRAVENOUS
Status: CANCELLED | OUTPATIENT
Start: 2023-03-24

## 2023-03-24 RX ORDER — OXYCODONE HYDROCHLORIDE AND ACETAMINOPHEN 5; 325 MG/1; MG/1
2 TABLET ORAL
Status: CANCELLED | OUTPATIENT
Start: 2023-03-24

## 2023-03-24 RX ORDER — HYDROMORPHONE HYDROCHLORIDE 1 MG/ML
0.1 INJECTION, SOLUTION INTRAMUSCULAR; INTRAVENOUS; SUBCUTANEOUS
Status: CANCELLED | OUTPATIENT
Start: 2023-03-24

## 2023-03-24 RX ORDER — ONDANSETRON 2 MG/ML
4 INJECTION INTRAMUSCULAR; INTRAVENOUS EVERY 6 HOURS PRN
Status: DISCONTINUED | OUTPATIENT
Start: 2023-03-25 | End: 2023-03-27 | Stop reason: HOSPADM

## 2023-03-24 RX ORDER — ONDANSETRON 4 MG/1
4 TABLET, ORALLY DISINTEGRATING ORAL EVERY 6 HOURS PRN
Status: DISCONTINUED | OUTPATIENT
Start: 2023-03-25 | End: 2023-03-27 | Stop reason: HOSPADM

## 2023-03-24 RX ORDER — CITRIC ACID/SODIUM CITRATE 334-500MG
30 SOLUTION, ORAL ORAL ONCE
Status: COMPLETED | OUTPATIENT
Start: 2023-03-24 | End: 2023-03-24

## 2023-03-24 RX ORDER — ONDANSETRON 2 MG/ML
INJECTION INTRAMUSCULAR; INTRAVENOUS PRN
Status: DISCONTINUED | OUTPATIENT
Start: 2023-03-24 | End: 2023-03-24 | Stop reason: SURG

## 2023-03-24 RX ORDER — ACETAMINOPHEN 500 MG
1000 TABLET ORAL EVERY 6 HOURS PRN
Status: DISCONTINUED | OUTPATIENT
Start: 2023-03-28 | End: 2023-03-27 | Stop reason: HOSPADM

## 2023-03-24 RX ORDER — KETOROLAC TROMETHAMINE 30 MG/ML
INJECTION, SOLUTION INTRAMUSCULAR; INTRAVENOUS PRN
Status: DISCONTINUED | OUTPATIENT
Start: 2023-03-24 | End: 2023-03-24 | Stop reason: SURG

## 2023-03-24 RX ORDER — HYDROMORPHONE HYDROCHLORIDE 1 MG/ML
0.2 INJECTION, SOLUTION INTRAMUSCULAR; INTRAVENOUS; SUBCUTANEOUS
Status: CANCELLED | OUTPATIENT
Start: 2023-03-24

## 2023-03-24 RX ORDER — OXYTOCIN 10 [USP'U]/ML
10 INJECTION, SOLUTION INTRAMUSCULAR; INTRAVENOUS
Status: CANCELLED | OUTPATIENT
Start: 2023-03-24

## 2023-03-24 RX ORDER — HYDRALAZINE HYDROCHLORIDE 20 MG/ML
5 INJECTION INTRAMUSCULAR; INTRAVENOUS
Status: CANCELLED | OUTPATIENT
Start: 2023-03-24

## 2023-03-24 RX ORDER — SODIUM CHLORIDE, SODIUM LACTATE, POTASSIUM CHLORIDE, CALCIUM CHLORIDE 600; 310; 30; 20 MG/100ML; MG/100ML; MG/100ML; MG/100ML
INJECTION, SOLUTION INTRAVENOUS PRN
Status: DISCONTINUED | OUTPATIENT
Start: 2023-03-24 | End: 2023-03-27 | Stop reason: HOSPADM

## 2023-03-24 RX ORDER — DIPHENHYDRAMINE HYDROCHLORIDE 50 MG/ML
12.5 INJECTION INTRAMUSCULAR; INTRAVENOUS EVERY 6 HOURS PRN
Status: ACTIVE | OUTPATIENT
Start: 2023-03-24 | End: 2023-03-25

## 2023-03-24 RX ORDER — DOCUSATE SODIUM 100 MG/1
100 CAPSULE, LIQUID FILLED ORAL 2 TIMES DAILY PRN
Status: DISCONTINUED | OUTPATIENT
Start: 2023-03-24 | End: 2023-03-27 | Stop reason: HOSPADM

## 2023-03-24 RX ORDER — MEPERIDINE HYDROCHLORIDE 25 MG/ML
6.25 INJECTION INTRAMUSCULAR; INTRAVENOUS; SUBCUTANEOUS
Status: CANCELLED | OUTPATIENT
Start: 2023-03-24

## 2023-03-24 RX ORDER — BUPIVACAINE HYDROCHLORIDE 7.5 MG/ML
INJECTION, SOLUTION INTRASPINAL
Status: COMPLETED | OUTPATIENT
Start: 2023-03-24 | End: 2023-03-24

## 2023-03-24 RX ORDER — DIPHENHYDRAMINE HCL 25 MG
25 TABLET ORAL EVERY 6 HOURS PRN
Status: DISCONTINUED | OUTPATIENT
Start: 2023-03-25 | End: 2023-03-27 | Stop reason: HOSPADM

## 2023-03-24 RX ORDER — DIPHENHYDRAMINE HYDROCHLORIDE 50 MG/ML
12.5 INJECTION INTRAMUSCULAR; INTRAVENOUS EVERY 6 HOURS PRN
Status: DISPENSED | OUTPATIENT
Start: 2023-03-24 | End: 2023-03-25

## 2023-03-24 RX ORDER — OXYTOCIN 10 [USP'U]/ML
INJECTION, SOLUTION INTRAMUSCULAR; INTRAVENOUS
Status: ACTIVE
Start: 2023-03-24 | End: 2023-03-25

## 2023-03-24 RX ORDER — PHENYLEPHRINE HCL IN 0.9% NACL 0.5 MG/5ML
SYRINGE (ML) INTRAVENOUS PRN
Status: DISCONTINUED | OUTPATIENT
Start: 2023-03-24 | End: 2023-03-24 | Stop reason: SURG

## 2023-03-24 RX ORDER — DIPHENHYDRAMINE HYDROCHLORIDE 50 MG/ML
25 INJECTION INTRAMUSCULAR; INTRAVENOUS EVERY 6 HOURS PRN
Status: ACTIVE | OUTPATIENT
Start: 2023-03-24 | End: 2023-03-25

## 2023-03-24 RX ORDER — ACETAMINOPHEN 500 MG
1000 TABLET ORAL EVERY 6 HOURS
Status: DISCONTINUED | OUTPATIENT
Start: 2023-03-25 | End: 2023-03-27 | Stop reason: HOSPADM

## 2023-03-24 RX ORDER — CEFAZOLIN SODIUM 1 G/3ML
2 INJECTION, POWDER, FOR SOLUTION INTRAMUSCULAR; INTRAVENOUS ONCE
Status: COMPLETED | OUTPATIENT
Start: 2023-03-24 | End: 2023-03-24

## 2023-03-24 RX ORDER — METOCLOPRAMIDE HYDROCHLORIDE 5 MG/ML
10 INJECTION INTRAMUSCULAR; INTRAVENOUS ONCE
Status: COMPLETED | OUTPATIENT
Start: 2023-03-24 | End: 2023-03-24

## 2023-03-24 RX ORDER — DIPHENHYDRAMINE HYDROCHLORIDE 50 MG/ML
25 INJECTION INTRAMUSCULAR; INTRAVENOUS EVERY 6 HOURS PRN
Status: DISCONTINUED | OUTPATIENT
Start: 2023-03-25 | End: 2023-03-27 | Stop reason: HOSPADM

## 2023-03-24 RX ORDER — KETOROLAC TROMETHAMINE 30 MG/ML
30 INJECTION, SOLUTION INTRAMUSCULAR; INTRAVENOUS EVERY 6 HOURS
Status: COMPLETED | OUTPATIENT
Start: 2023-03-24 | End: 2023-03-25

## 2023-03-24 RX ORDER — MIDAZOLAM HYDROCHLORIDE 1 MG/ML
INJECTION INTRAMUSCULAR; INTRAVENOUS PRN
Status: DISCONTINUED | OUTPATIENT
Start: 2023-03-24 | End: 2023-03-24 | Stop reason: SURG

## 2023-03-24 RX ORDER — OXYCODONE HYDROCHLORIDE 5 MG/1
5 TABLET ORAL EVERY 4 HOURS PRN
Status: DISCONTINUED | OUTPATIENT
Start: 2023-03-25 | End: 2023-03-27 | Stop reason: HOSPADM

## 2023-03-24 RX ORDER — EPHEDRINE SULFATE 50 MG/ML
10 INJECTION, SOLUTION INTRAVENOUS
Status: ACTIVE | OUTPATIENT
Start: 2023-03-24 | End: 2023-03-25

## 2023-03-24 RX ORDER — HALOPERIDOL 5 MG/ML
1 INJECTION INTRAMUSCULAR
Status: CANCELLED | OUTPATIENT
Start: 2023-03-24

## 2023-03-24 RX ORDER — OXYCODONE HYDROCHLORIDE 10 MG/1
10 TABLET ORAL EVERY 4 HOURS PRN
Status: DISPENSED | OUTPATIENT
Start: 2023-03-24 | End: 2023-03-25

## 2023-03-24 RX ORDER — LABETALOL HYDROCHLORIDE 5 MG/ML
5 INJECTION, SOLUTION INTRAVENOUS
Status: CANCELLED | OUTPATIENT
Start: 2023-03-24

## 2023-03-24 RX ORDER — HYDROMORPHONE HYDROCHLORIDE 1 MG/ML
0.4 INJECTION, SOLUTION INTRAMUSCULAR; INTRAVENOUS; SUBCUTANEOUS
Status: ACTIVE | OUTPATIENT
Start: 2023-03-24 | End: 2023-03-25

## 2023-03-24 RX ORDER — OXYCODONE HYDROCHLORIDE AND ACETAMINOPHEN 5; 325 MG/1; MG/1
1 TABLET ORAL
Status: CANCELLED | OUTPATIENT
Start: 2023-03-24

## 2023-03-24 RX ORDER — OXYCODONE HYDROCHLORIDE 5 MG/1
5 TABLET ORAL EVERY 4 HOURS PRN
Status: ACTIVE | OUTPATIENT
Start: 2023-03-24 | End: 2023-03-25

## 2023-03-24 RX ORDER — DIPHENHYDRAMINE HYDROCHLORIDE 50 MG/ML
12.5 INJECTION INTRAMUSCULAR; INTRAVENOUS
Status: CANCELLED | OUTPATIENT
Start: 2023-03-24

## 2023-03-24 RX ORDER — IBUPROFEN 800 MG/1
800 TABLET ORAL EVERY 8 HOURS PRN
Status: DISCONTINUED | OUTPATIENT
Start: 2023-03-28 | End: 2023-03-27 | Stop reason: HOSPADM

## 2023-03-24 RX ORDER — SODIUM CHLORIDE, SODIUM GLUCONATE, SODIUM ACETATE, POTASSIUM CHLORIDE AND MAGNESIUM CHLORIDE 526; 502; 368; 37; 30 MG/100ML; MG/100ML; MG/100ML; MG/100ML; MG/100ML
INJECTION, SOLUTION INTRAVENOUS
Status: DISCONTINUED | OUTPATIENT
Start: 2023-03-24 | End: 2023-03-24 | Stop reason: SURG

## 2023-03-24 RX ORDER — SODIUM CHLORIDE, SODIUM LACTATE, POTASSIUM CHLORIDE, CALCIUM CHLORIDE 600; 310; 30; 20 MG/100ML; MG/100ML; MG/100ML; MG/100ML
INJECTION, SOLUTION INTRAVENOUS ONCE
Status: CANCELLED | OUTPATIENT
Start: 2023-03-24 | End: 2023-03-24

## 2023-03-24 RX ORDER — OXYCODONE HYDROCHLORIDE 10 MG/1
10 TABLET ORAL EVERY 4 HOURS PRN
Status: DISCONTINUED | OUTPATIENT
Start: 2023-03-25 | End: 2023-03-27 | Stop reason: HOSPADM

## 2023-03-24 RX ORDER — MISOPROSTOL 200 UG/1
800 TABLET ORAL
Status: CANCELLED | OUTPATIENT
Start: 2023-03-24

## 2023-03-24 RX ORDER — SODIUM CHLORIDE, SODIUM GLUCONATE, SODIUM ACETATE, POTASSIUM CHLORIDE AND MAGNESIUM CHLORIDE 526; 502; 368; 37; 30 MG/100ML; MG/100ML; MG/100ML; MG/100ML; MG/100ML
1500 INJECTION, SOLUTION INTRAVENOUS ONCE
Status: COMPLETED | OUTPATIENT
Start: 2023-03-24 | End: 2023-03-24

## 2023-03-24 RX ORDER — ONDANSETRON 2 MG/ML
4 INJECTION INTRAMUSCULAR; INTRAVENOUS
Status: CANCELLED | OUTPATIENT
Start: 2023-03-24

## 2023-03-24 RX ORDER — HYDROMORPHONE HYDROCHLORIDE 1 MG/ML
0.4 INJECTION, SOLUTION INTRAMUSCULAR; INTRAVENOUS; SUBCUTANEOUS
Status: CANCELLED | OUTPATIENT
Start: 2023-03-24

## 2023-03-24 RX ADMIN — FENTANYL CITRATE 50 MCG: 50 INJECTION, SOLUTION INTRAMUSCULAR; INTRAVENOUS at 12:45

## 2023-03-24 RX ADMIN — FENTANYL CITRATE 50 MCG: 50 INJECTION, SOLUTION INTRAMUSCULAR; INTRAVENOUS at 12:56

## 2023-03-24 RX ADMIN — METOCLOPRAMIDE 10 MG: 5 INJECTION, SOLUTION INTRAMUSCULAR; INTRAVENOUS at 11:35

## 2023-03-24 RX ADMIN — MORPHINE SULFATE 150 MCG: 0.5 INJECTION, SOLUTION EPIDURAL; INTRATHECAL; INTRAVENOUS at 12:20

## 2023-03-24 RX ADMIN — PHENYLEPHRINE HYDROCHLORIDE 0.3 MCG/KG/MIN: 10 INJECTION INTRAVENOUS at 12:20

## 2023-03-24 RX ADMIN — DIPHENHYDRAMINE HYDROCHLORIDE 12.5 MG: 50 INJECTION, SOLUTION INTRAMUSCULAR; INTRAVENOUS at 22:33

## 2023-03-24 RX ADMIN — CEFAZOLIN 2 G: 330 INJECTION, POWDER, FOR SOLUTION INTRAMUSCULAR; INTRAVENOUS at 12:25

## 2023-03-24 RX ADMIN — FENTANYL CITRATE 50 MCG: 50 INJECTION, SOLUTION INTRAMUSCULAR; INTRAVENOUS at 12:58

## 2023-03-24 RX ADMIN — SODIUM CHLORIDE, SODIUM GLUCONATE, SODIUM ACETATE, POTASSIUM CHLORIDE AND MAGNESIUM CHLORIDE: 526; 502; 368; 37; 30 INJECTION, SOLUTION INTRAVENOUS at 12:05

## 2023-03-24 RX ADMIN — KETOROLAC TROMETHAMINE 30 MG: 30 INJECTION, SOLUTION INTRAMUSCULAR at 13:00

## 2023-03-24 RX ADMIN — SODIUM CHLORIDE, SODIUM GLUCONATE, SODIUM ACETATE, POTASSIUM CHLORIDE AND MAGNESIUM CHLORIDE 1500 ML: 526; 502; 368; 37; 30 INJECTION, SOLUTION INTRAVENOUS at 11:38

## 2023-03-24 RX ADMIN — OXYTOCIN 1000 ML: 10 INJECTION, SOLUTION INTRAMUSCULAR; INTRAVENOUS at 12:41

## 2023-03-24 RX ADMIN — SODIUM CITRATE AND CITRIC ACID MONOHYDRATE 30 ML: 500; 334 SOLUTION ORAL at 11:35

## 2023-03-24 RX ADMIN — ONDANSETRON 4 MG: 2 INJECTION INTRAMUSCULAR; INTRAVENOUS at 12:48

## 2023-03-24 RX ADMIN — FAMOTIDINE 20 MG: 10 INJECTION, SOLUTION INTRAVENOUS at 11:37

## 2023-03-24 RX ADMIN — ACETAMINOPHEN 1000 MG: 500 TABLET, FILM COATED ORAL at 16:26

## 2023-03-24 RX ADMIN — FENTANYL CITRATE 50 MCG: 50 INJECTION, SOLUTION INTRAMUSCULAR; INTRAVENOUS at 12:46

## 2023-03-24 RX ADMIN — Medication 200 MCG: at 12:25

## 2023-03-24 RX ADMIN — MIDAZOLAM HYDROCHLORIDE 2 MG: 1 INJECTION, SOLUTION INTRAMUSCULAR; INTRAVENOUS at 12:57

## 2023-03-24 RX ADMIN — KETOROLAC TROMETHAMINE 30 MG: 30 INJECTION, SOLUTION INTRAMUSCULAR at 18:44

## 2023-03-24 RX ADMIN — ACETAMINOPHEN 1000 MG: 500 TABLET, FILM COATED ORAL at 21:01

## 2023-03-24 RX ADMIN — BUPIVACAINE HYDROCHLORIDE IN DEXTROSE 1.6 ML: 7.5 INJECTION, SOLUTION SUBARACHNOID at 12:20

## 2023-03-24 ASSESSMENT — LIFESTYLE VARIABLES
EVER FELT BAD OR GUILTY ABOUT YOUR DRINKING: NO
HAVE PEOPLE ANNOYED YOU BY CRITICIZING YOUR DRINKING: NO
TOTAL SCORE: 0
EVER HAD A DRINK FIRST THING IN THE MORNING TO STEADY YOUR NERVES TO GET RID OF A HANGOVER: NO
CONSUMPTION TOTAL: INCOMPLETE
TOTAL SCORE: 0
HAVE YOU EVER FELT YOU SHOULD CUT DOWN ON YOUR DRINKING: NO
ALCOHOL_USE: NO
TOTAL SCORE: 0
EVER_SMOKED: NEVER

## 2023-03-24 ASSESSMENT — PATIENT HEALTH QUESTIONNAIRE - PHQ9
2. FEELING DOWN, DEPRESSED, IRRITABLE, OR HOPELESS: NOT AT ALL
1. LITTLE INTEREST OR PLEASURE IN DOING THINGS: NOT AT ALL
SUM OF ALL RESPONSES TO PHQ9 QUESTIONS 1 AND 2: 0

## 2023-03-24 ASSESSMENT — PAIN DESCRIPTION - PAIN TYPE
TYPE: SURGICAL PAIN
TYPE: SURGICAL PAIN
TYPE: ACUTE PAIN;SURGICAL PAIN

## 2023-03-24 ASSESSMENT — FIBROSIS 4 INDEX: FIB4 SCORE: 0.27

## 2023-03-24 ASSESSMENT — PAIN SCALES - GENERAL: PAIN_LEVEL: 0

## 2023-03-24 NOTE — PROGRESS NOTES
0955  23y.o at 39.1 weeks arrived for repeat  with bilateral salpingectomy. Patient prepped for procedure.   1158 Patient moved from Triage to OR 1  1239 Viable male infant delivered at 1239. APGARS 8/9  1316 Procedure finished. .  1325 Patient transported to PACU on rney with side rails up. Report from Rashmi KHAN.

## 2023-03-24 NOTE — ANESTHESIA POSTPROCEDURE EVALUATION
Patient: Joelle Goldberg    Procedure Summary     Date: 23 Room / Location: [facility] OR 01 / [facility]ERY [facility]    Anesthesia Start: 1205 Anesthesia Stop: 1327    Procedure: REPEAT  SECTION Diagnosis:       (PREVIOUS )      (39 WEEKS)    Surgeons: Allison Rico M.D. Responsible Provider: Loco Caraballo M.D.    Anesthesia Type: spinal ASA Status: 2          Final Anesthesia Type: spinal  Last vitals  BP   Blood Pressure: 139/63    Temp   36.7 °C (98.1 °F)    Pulse   75   Resp   18    SpO2   97 %      Anesthesia Post Evaluation    Patient location during evaluation: floor  Patient participation: complete - patient participated  Level of consciousness: awake and alert  Pain score: 0    Airway patency: patent  Anesthetic complications: no  Cardiovascular status: hemodynamically stable  Respiratory status: acceptable  Hydration status: euvolemic    PONV: none          No notable events documented.     Nurse Pain Score: 4 (NPRS)

## 2023-03-24 NOTE — ANESTHESIA TIME REPORT
Anesthesia Start and Stop Event Times     Date Time Event    3/24/2023 1145 Ready for Procedure     1205 Anesthesia Start     1327 Anesthesia Stop        Responsible Staff  03/24/23    Name Role Begin End    Loco Caraballo M.D. Anesth 1205 1327        Overtime Reason:  no overtime (within assigned shift)    Comments:

## 2023-03-24 NOTE — ANESTHESIA PROCEDURE NOTES
Spinal Block    Date/Time: 3/24/2023 12:15 PM  Performed by: Loco Caraballo M.D.  Authorized by: Loco Caraballo M.D.     Patient Location:  OR  Start Time:  3/24/2023 12:15 PM  End Time:  3/24/2023 12:20 PM  Reason for Block: primary anesthetic    patient identified, IV checked, site marked, risks and benefits discussed, surgical consent, monitors and equipment checked, pre-op evaluation and timeout performed    Patient Position:  Sitting  Prep: ChloraPrep, patient draped and sterile technique    Monitoring:  Blood pressure, continuous pulse oximetry and heart rate  Approach:  Left paramedian  Location:  L2-3  Injection Technique:  Single-shot  Skin infiltration:  Lidocaine  Strength:  1%  Dose:  3ml  Needle Type:  Pencan  Needle Gauge:  25 G  CSF flowing pre/post injection:  Yes  Sensory Level:  T4

## 2023-03-24 NOTE — H&P
OB H&P:    CC: Scheduled repeat     HPI:  Ms. Joelle Goldberg is a 23 y.o.  @ 39w1d by ultrasound presents for scheduled repeat .  Patient is status post x3 C-sections.  This pregnancy complicated by anemia (last hemoglobin in ), recurrent yeast infection s/p treatment, cholelithiasis status post cholecystectomy back in 2022.  Otherwise pregnancy fairly uncomplicated.  For  was urgency due to fetal distress.  Second and third C-sections were uncomplicated.  Patient last ate at 9 PM last night.    Patient reports no headache, lightheadedness, nausea, vomiting, chest pain, shortness of breath, urinary symptoms, contractions, bleeding.    Contractions: No   Loss of fluid: No   Vaginal bleeding: No   Fetal movement: Present      PNC with [facility]    PNL:  Blood Type AB pos, Rubella immune, HIV neg, RPR/TrepAb neg, HBsAg NR, GC/CT neg/neg  UDS neg  1 HR GTT: 83  3 HR GTT: not indicated  GBS positive      ROS:  Const: denies fevers, general concerns  CV/resp: reports no concerns  GI: denies abd pain, GI concerns  : see HPI  Neuro: denies HA/vision changes    OB History    Para Term  AB Living   5 3 3   1 3   SAB IAB Ectopic Molar Multiple Live Births   1       0 3      # Outcome Date GA Lbr Howard/2nd Weight Sex Delivery Anes PTL Lv   5 Current            4 Term 20 39w0d  3.395 kg (7 lb 7.8 oz) M CS-LTranv Spinal N OXANA   3 Term 18 39w0d  3.61 kg (7 lb 15.3 oz) M CS-LTranv Spinal N OXANA      Birth Comments: C/Section for repeat   2 SAB 16 18w0d   M SAB  Y FD      Birth Comments: baby passed on its own in [place] at home, but D&C needed   1 Term 03/17/15 40w3d  2.863 kg (6 lb 5 oz) M CS-LTranv EPI N OXANA      Birth Comments: baby's heart rate dropped      Complications: Fetal Intolerance       GYN: denies STIs, no cervical procedures    Past Medical History:   Diagnosis Date    Anxiety        Past Surgical History:  "  Procedure Laterality Date    ALTAF BY LAPAROSCOPY  2022    Procedure: CHOLECYSTECTOMY, LAPAROSCOPIC;  Surgeon: Cynthia Soler M.D.;  Location: SURGERY [facility];  Service: Gen Robotic    REPEAT C SECTION N/A 2020    Procedure:  SECTION, REPEAT;  Surgeon: Gladys Murcia D.O.;  Location: LABOR AND DELIVERY;  Service: Obstetrics    REPEAT C SECTION  3/11/2018    Procedure: REPEAT C SECTION;  Surgeon: Brandy Moreland M.D.;  Location: LABOR AND DELIVERY;  Service: Labor and Delivery    PRIMARY C SECTION  3/17/2015    Performed by Blue Andino M.D. at LABOR AND DELIVERY       No current facility-administered medications on file prior to encounter.     Current Outpatient Medications on File Prior to Encounter   Medication Sig Dispense Refill    Prenatal MV-Min-Fe Fum-FA-DHA (PRENATAL 1 PO) Take  by mouth.         Family History   Problem Relation Age of Onset    Arthritis Mother     Hyperlipidemia Mother     Hyperlipidemia Father     Diabetes Father     No Known Problems Brother        Social History     Tobacco Use    Smoking status: Former     Types: Cigarettes    Smokeless tobacco: Never   Vaping Use    Vaping Use: Never used   Substance Use Topics    Alcohol use: Not Currently    Drug use: Not Currently         PE:  Vitals:    23 1000   Weight: 104 kg (230 lb)   Height: 1.651 m (5' 5\")       GEN: AAO, NAD  HEENT: normocephalic, atraumatic, EOMI  CV: rrr, no m/r/g  Resp: CTAB, no w/r/r  Abd: soft, gravid, NT  Ext: NT, no peripheral edema  Derm: no visible lesions or rashes  Neuro: grossly intact    SVE: Deferred  FHT: Baseline 130/moderate variability/+ accels/ - decels  Guido: Contractions none    A/P: 23 y.o.  @ 39w1d by ultrasound presents for scheduled repeat ; status post x3 C-sections.  Patient also somewhat anemic, hemoglobin back in 2022 was 11.7.  Patient last ate at 9 PM last night.    - Admit to L&D  -N.p.o.  - Prep OR for   - Pre-op " antibiotics  - Initiate routine intrapartum care  - Cat 1 tracing, continue to monitor EFM  - LR @ 125ml/h      Lizeth Banks M.D.

## 2023-03-24 NOTE — OP REPORT
PREOPERATIVE DIAGNOSIS:   IUP @ 39 weeks   Previous c/section X 3  Desires permanent sterilization    POSTOPERATIVE DIAGNOSIS:  Same    PROCEDURE: Repeat Low Transverse  Section via Pfannensteil with bilateral salpingectomy    SURGEON: Allison Rico MD    ASSISTANT: Maria Elena Banks MD    ANESTHESIA: spinal by Dr Loco Caraballo    COMPLICATIONS: none    EBL:  500 cc    FLUIDS: 1000 cc LR    URINE OUTPUT: 100 cc clear    INDICATIONS: repeat    FINDINGS: Viable male infant in vertex presentation with clear fluid, apgars 8 and 9, weight pending. Normal uterus, tubes, ovaries.     PROCEDURE IN DETAIL: The patient was taken to the operating room where  spinal anesthesia was found to be adequate.  She was then prepped and draped in the normal sterile fashion in the dorsal supine position with a leftward hip tilt.  A Pfannenstiel skin incision was then made with the scalpel and carried through to the underlying layer of fascia, which was nicked in the midline and the incision was extended laterally with the Camacho scissors.  The superior aspect of the fascial incision was then grasped with Kocher clamps, elevated, and the underlying rectus muscles dissected off sharply.  Attention was then turned to the inferior aspect of this incision which, in a similar fashion, was grasped, tented up with Kocher clamps, and the rectus muscle was dissected off sharply.  The rectus muscles were then  in the midline, and the peritoneum was identified, tented up, and entered sharply with the Metzenbaum scissors.  The peritoneal incision was then extended superiorly and inferiorly with good visualization of the bladder.  The Johny-O retractor was placed, checked for safe positioning, and tightened into place.  There were adhesions of the bladder to the VALE. The vesicouterine peritoneum identified, grasped with pickups, and entered sharply with the Metzenbaum scissors.  This incision was then extended laterally and the  bladder flap was created digitally.  The lower uterine segment incised in a transverse fashion with the scalpel.  The uterine incision was then extended manually with fingers.  The infant's head was delivered atraumatically.  The remainder of the infant was delivered without difficulty.  The infant's nose and mouth were bulb suctioned on the field.  The cord was clamped and cut after approximately 30 seconds.  The infant was then handed off to the awaiting attendant. The placenta was then removed by gentle traction on the cord and exterior uterine massage, the uterus exteriorized, and cleared of all clot and debris with a dry laparotomy sponge.  The uterine incision was repaired with 1-0 chromic in a running, locked fashion.  A second layer of the same suture was used to obtain excellent hemostasis.    Attention was then turned to the fallopian tubes.  The right fallopian tube was identified and followed out to the fimbriated end.  The right fallopian tube was grasped and elevated with [place] clamp.  The adhesions involving the distal end of the right fallopian tube to the right ovary were cauterized and cut using the LigaSure device.  The mesosalpinx was then cauterized and cut with the LigaSure device sequentially along the entire inferior surface of the fallopian tube.  The tube was then amputated at the uterine cornua.  Excellent hemostasis was noted.  Attention was then turned to the left fallopian tube, which was identified and followed out to the fimbriated end.  The tube was grasped and elevated with Claudia clamp.  The adhesions involving the left distal fallopian tube to the left ovary were cauterized and cut with the LigaSure device.  The mesosalpinx on the inferior surface of the tube was then sequentially cauterized and cut with the LigaSure device along the entire length of the tube.  The tube was then amputated at the uterine cornua.  Excellent hemostasis was noted.  Both tubes were sent to  pathology.   The uterus was returned to the abdomen and the gutters were cleared of all clot and debris.  The rectus muscle layer was reapproximateed with 3-0 Vicryl.  The fascia was approximated with 0 Vicryl in a running fashion.  The subcuticular fat was irrigated.  Thom's fascia was closed with interrupted sutures of 2-0 Vicryl.  The skin was closed with 4-0 monocryl on a Placido needle. Mepelex dressing applied.  The patient tolerated the procedure well.  Sponge lap and needle counts were correct x3.  The patient was taken to the recovery room in stable condition.

## 2023-03-24 NOTE — ANESTHESIA PREPROCEDURE EVALUATION
Case: [id number] Date/Time: 23 1145    Procedure: REPEAT  SECTION    Pre-op diagnosis:       PREVIOUS       39 WEEKS    Location: [facility] OR 01 / [facility]ERY [facility] DEL[facility]ERY    Surgeons: Allison Rico M.D.          Relevant Problems   No relevant active problems       Physical Exam    Airway   Mallampati: II  TM distance: >3 FB  Neck ROM: full       Cardiovascular - normal exam  Rhythm: regular  Rate: normal  (-) murmur     Dental - normal exam           Pulmonary - normal exam  Breath sounds clear to auscultation     Abdominal   (+) obese    Comments: Gravid   Neurological - normal exam                 Anesthesia Plan    ASA 2       Plan - spinal   Neuraxial block will be primary anesthetic                Postoperative Plan: Postoperative administration of opioids is intended.    Pertinent diagnostic labs and testing reviewed    Informed Consent:    Anesthetic plan and risks discussed with patient.

## 2023-03-25 LAB
ERYTHROCYTE [DISTWIDTH] IN BLOOD BY AUTOMATED COUNT: 43.7 FL (ref 35.9–50)
HCT VFR BLD AUTO: 29.6 % (ref 37–47)
HGB BLD-MCNC: 9.3 G/DL (ref 12–16)
MCH RBC QN AUTO: 22 PG (ref 27–33)
MCHC RBC AUTO-ENTMCNC: 31.4 G/DL (ref 33.6–35)
MCV RBC AUTO: 70 FL (ref 81.4–97.8)
PLATELET # BLD AUTO: 233 K/UL (ref 164–446)
PMV BLD AUTO: 10.8 FL (ref 9–12.9)
RBC # BLD AUTO: 4.23 M/UL (ref 4.2–5.4)
WBC # BLD AUTO: 13.7 K/UL (ref 4.8–10.8)

## 2023-03-25 PROCEDURE — 700102 HCHG RX REV CODE 250 W/ 637 OVERRIDE(OP): Performed by: EMERGENCY MEDICINE

## 2023-03-25 PROCEDURE — A9270 NON-COVERED ITEM OR SERVICE: HCPCS | Performed by: EMERGENCY MEDICINE

## 2023-03-25 PROCEDURE — 85027 COMPLETE CBC AUTOMATED: CPT

## 2023-03-25 PROCEDURE — 700102 HCHG RX REV CODE 250 W/ 637 OVERRIDE(OP): Performed by: BEHAVIOR ANALYST

## 2023-03-25 PROCEDURE — 770002 HCHG ROOM/CARE - OB PRIVATE (112)

## 2023-03-25 PROCEDURE — 51798 US URINE CAPACITY MEASURE: CPT

## 2023-03-25 PROCEDURE — 36415 COLL VENOUS BLD VENIPUNCTURE: CPT

## 2023-03-25 PROCEDURE — A9270 NON-COVERED ITEM OR SERVICE: HCPCS | Performed by: BEHAVIOR ANALYST

## 2023-03-25 PROCEDURE — 700111 HCHG RX REV CODE 636 W/ 250 OVERRIDE (IP): Performed by: EMERGENCY MEDICINE

## 2023-03-25 RX ORDER — FERROUS SULFATE 325(65) MG
325 TABLET ORAL
Status: DISCONTINUED | OUTPATIENT
Start: 2023-03-25 | End: 2023-03-27 | Stop reason: HOSPADM

## 2023-03-25 RX ADMIN — OXYCODONE HYDROCHLORIDE 10 MG: 10 TABLET ORAL at 15:26

## 2023-03-25 RX ADMIN — KETOROLAC TROMETHAMINE 30 MG: 30 INJECTION, SOLUTION INTRAMUSCULAR at 06:21

## 2023-03-25 RX ADMIN — OXYCODONE HYDROCHLORIDE 10 MG: 10 TABLET ORAL at 11:57

## 2023-03-25 RX ADMIN — PRENATAL WITH FERROUS FUM AND FOLIC ACID 1 TABLET: 3080; 920; 120; 400; 22; 1.84; 3; 20; 10; 1; 12; 200; 27; 25; 2 TABLET ORAL at 08:33

## 2023-03-25 RX ADMIN — ACETAMINOPHEN 1000 MG: 500 TABLET, FILM COATED ORAL at 11:16

## 2023-03-25 RX ADMIN — OXYCODONE HYDROCHLORIDE 10 MG: 10 TABLET ORAL at 01:23

## 2023-03-25 RX ADMIN — ACETAMINOPHEN 1000 MG: 500 TABLET, FILM COATED ORAL at 16:13

## 2023-03-25 RX ADMIN — ACETAMINOPHEN 1000 MG: 500 TABLET, FILM COATED ORAL at 04:51

## 2023-03-25 RX ADMIN — OXYCODONE HYDROCHLORIDE 10 MG: 10 TABLET ORAL at 21:09

## 2023-03-25 RX ADMIN — KETOROLAC TROMETHAMINE 30 MG: 30 INJECTION, SOLUTION INTRAMUSCULAR at 01:00

## 2023-03-25 RX ADMIN — IBUPROFEN 800 MG: 800 TABLET, FILM COATED ORAL at 18:35

## 2023-03-25 RX ADMIN — KETOROLAC TROMETHAMINE 30 MG: 30 INJECTION, SOLUTION INTRAMUSCULAR at 13:00

## 2023-03-25 RX ADMIN — FERROUS SULFATE TAB 325 MG (65 MG ELEMENTAL FE) 325 MG: 325 (65 FE) TAB at 08:34

## 2023-03-25 ASSESSMENT — PAIN DESCRIPTION - PAIN TYPE
TYPE: ACUTE PAIN;SURGICAL PAIN
TYPE: SURGICAL PAIN
TYPE: SURGICAL PAIN
TYPE: ACUTE PAIN;SURGICAL PAIN
TYPE: SURGICAL PAIN
TYPE: ACUTE PAIN;SURGICAL PAIN
TYPE: SURGICAL PAIN;ACUTE PAIN
TYPE: ACUTE PAIN;SURGICAL PAIN
TYPE: SURGICAL PAIN

## 2023-03-25 ASSESSMENT — EDINBURGH POSTNATAL DEPRESSION SCALE (EPDS)
I HAVE FELT SAD OR MISERABLE: NO, NOT AT ALL
I HAVE BEEN SO UNHAPPY THAT I HAVE HAD DIFFICULTY SLEEPING: NOT AT ALL
I HAVE LOOKED FORWARD WITH ENJOYMENT TO THINGS: AS MUCH AS I EVER DID
I HAVE FELT SCARED OR PANICKY FOR NO GOOD REASON: NO, NOT AT ALL
I HAVE BEEN ANXIOUS OR WORRIED FOR NO GOOD REASON: NO, NOT AT ALL
I HAVE BEEN SO UNHAPPY THAT I HAVE BEEN CRYING: NO, NEVER
THE THOUGHT OF HARMING MYSELF HAS OCCURRED TO ME: NEVER
THINGS HAVE BEEN GETTING ON TOP OF ME: NO, I HAVE BEEN COPING AS WELL AS EVER
I HAVE BLAMED MYSELF UNNECESSARILY WHEN THINGS WENT WRONG: NO, NEVER
I HAVE BEEN ABLE TO LAUGH AND SEE THE FUNNY SIDE OF THINGS: AS MUCH AS I ALWAYS COULD

## 2023-03-25 NOTE — PROGRESS NOTES
0430: Pt only voided 30-50ml of urine after d/c of catheter at 2230 last night. Pt reported of not fully emptying out bladder and bladder does not feel full.    This RN bladder scan: 77ml. Encouraged pt to drink more water and fluids.

## 2023-03-25 NOTE — CARE PLAN
The patient is Stable - Low risk of patient condition declining or worsening    Shift Goals  Clinical Goals: lochia wdl, vss, pain management  Patient Goals: pain management, bond with baby  Family Goals: support    Progress made toward(s) clinical / shift goals: Plan of care and medications discussed with pt. Pt demonstrated effective bonding and parent behavior by feeding baby q3 hours, skin to skin and diaper changes. Normal lochia, light bleeding, and fundus is firm. No s/sx of infection on incx. Dressing is c/d/I. Pt mobilized and mchugh d/c.      Problem: Knowledge Deficit - Postpartum  Goal: Patient will verbalize and demonstrate understanding of self and infant care  Outcome: Progressing     Problem: Psychosocial - Postpartum  Goal: Patient will verbalize and demonstrate effective bonding and parenting behavior  Outcome: Progressing     Problem: Altered Physiologic Condition  Goal: Patient physiologically stable as evidenced by normal lochia, palpable uterine involution and vitals within normal limits  Outcome: Progressing     Problem: Infection - Postpartum  Goal: Postpartum patient will be free of signs and symptoms of infection  Outcome: Progressing     Problem: Early Mobilization - Post Surgery  Goal: Early mobilization post surgery  Outcome: Progressing

## 2023-03-25 NOTE — DISCHARGE PLANNING
Discharge Planning Assessment Post Partum    Completed chart review and discussed with RN    Reason for Referral: Car seat resources. Mother concerned about other children in the care of father who is here from out of town  Address: [address] in [place]  Type of Living Situation:stable  Mom Diagnosis: post partum  Baby Diagnosis:   Primary Language: Peruvian fluent english    Name of Baby: hasn't decided on name  Father of the Baby: Pipe Frost  Involved in baby’s care? Limited - parents are not together. Father does not live locally. He is currently in town.  Contact Information: [phone]    Prenatal Care: yes  Mom's PCP: none  PCP for new baby: JACQUE Zamora    Support System: family  Coping/Bonding between mother & baby: appropriate  Source of Feeding: bottle  Supplies for Infant: prepared - discussed car seat. Mother asked about resources. Discussed fitting station through [employer]. Mother states her father can get her a car seat. She was asking because RN encouraged her to inquire.     Mom's Insurance: Medicaid  Baby Covered on Insurance:will add  Mother Employed/School: [employer]  Other children in the home/names & ages: Max, 8 years old, Rebel 5 years old and Marcos 2 years old.     Financial Hardship/Income: denies   Mom's Mental status: alert and oriented  Services used prior to admit: TANF, SNAP, Medicaid. Mother plans to apply for [facility]    CPS History: denies  Psychiatric History: denies  Domestic Violence History: denies - discussed RN concern that mother as worried about other children with father. Mother denies any abuse or violence. She states she was emotional and wanted to see her other children and was not concerned for their safety. Other children visiting today with MOB father.   Drug/ETOH History: denies    Resources Provided: community resources including fitting station info for car seat, PP support, [facility]   Referrals Made: Onsite Care    Social  Worker Clearance for Discharge: Infant cleared to discharge home to mother when ready.

## 2023-03-25 NOTE — DISCHARGE SUMMARY
"Subjective:  Pt states she is doing well.  Pt also complains of minimal bleeding and moderate abdominal pain. No other complaints at this time.     Pt is eating, ambulating, urinating without issue. Pt states she has not had a BM.     No complaints of lightheadedness, dizziness, palpitations, HA, weakness, CP, SOB, at this time. No reports of n/v/c/d.       Objective:    VS: /72   Pulse 80   Temp 36.7 °C (98 °F) (Temporal)   Resp 18   Ht 1.651 m (5' 5\")   Wt 104 kg (230 lb)   LMP 2022   SpO2 95%   Breastfeeding No   BMI 38.27 kg/m²       Physical Exam:  General: well appearing, no apparent distress  Abdomen:soft, nontender, nondistended  Fundus: firm at level below umbilicus  Incision:  intact, no discharge, healing well  Perineum: Deferred  Extremities: symmetric, no peripheral edema, calves nontender      A/P:  22yo  s/p  repeat  pod#1. Hgb stable at 9.3, decreased minimal bleeding; vss; Assesment is overall clinical picture stable, given anemia will start iron;     -start iron  -monitor sx exacerbation bleeding  -monitor vitals  -pain meds prn  -monitor urine output  "

## 2023-03-26 PROCEDURE — A9270 NON-COVERED ITEM OR SERVICE: HCPCS | Performed by: BEHAVIOR ANALYST

## 2023-03-26 PROCEDURE — 700102 HCHG RX REV CODE 250 W/ 637 OVERRIDE(OP): Performed by: BEHAVIOR ANALYST

## 2023-03-26 PROCEDURE — 770002 HCHG ROOM/CARE - OB PRIVATE (112)

## 2023-03-26 RX ADMIN — ACETAMINOPHEN 1000 MG: 500 TABLET, FILM COATED ORAL at 17:22

## 2023-03-26 RX ADMIN — FERROUS SULFATE TAB 325 MG (65 MG ELEMENTAL FE) 325 MG: 325 (65 FE) TAB at 08:25

## 2023-03-26 RX ADMIN — ACETAMINOPHEN 1000 MG: 500 TABLET, FILM COATED ORAL at 11:25

## 2023-03-26 RX ADMIN — IBUPROFEN 800 MG: 800 TABLET, FILM COATED ORAL at 05:18

## 2023-03-26 RX ADMIN — IBUPROFEN 800 MG: 800 TABLET, FILM COATED ORAL at 22:09

## 2023-03-26 RX ADMIN — ACETAMINOPHEN 1000 MG: 500 TABLET, FILM COATED ORAL at 01:19

## 2023-03-26 RX ADMIN — ACETAMINOPHEN 1000 MG: 500 TABLET, FILM COATED ORAL at 05:18

## 2023-03-26 RX ADMIN — PRENATAL WITH FERROUS FUM AND FOLIC ACID 1 TABLET: 3080; 920; 120; 400; 22; 1.84; 3; 20; 10; 1; 12; 200; 27; 25; 2 TABLET ORAL at 08:25

## 2023-03-26 RX ADMIN — IBUPROFEN 800 MG: 800 TABLET, FILM COATED ORAL at 13:26

## 2023-03-26 RX ADMIN — OXYCODONE HYDROCHLORIDE 10 MG: 10 TABLET ORAL at 01:19

## 2023-03-26 RX ADMIN — OXYCODONE HYDROCHLORIDE 10 MG: 10 TABLET ORAL at 05:19

## 2023-03-26 ASSESSMENT — PAIN DESCRIPTION - PAIN TYPE
TYPE: ACUTE PAIN;SURGICAL PAIN
TYPE: SURGICAL PAIN;ACUTE PAIN
TYPE: ACUTE PAIN;SURGICAL PAIN

## 2023-03-26 NOTE — PROGRESS NOTES
Assumed patient care. Pt is resting comfortably with baby in crib. Pt rates pain 3/10 and requested heat packs. Patient assessment completed. Discussed infant bulb suction and emergency call light. POC reviewed with patient all questions answered. Will continue to monitor, call light in reach.      Mepilex silver dressing was changed per MD verbal order.

## 2023-03-26 NOTE — PROGRESS NOTES
Obstetrics & Gynecology Post-Delivery Progress Note    Date of Service      23 y.o.  s/p  for repeat  Delivery date: 2023    Events  Increased pain, ambulating well. Bottlefeeding.     Subjective  Pain: Yes,  location left side of incision. Unsure if this is deep or more skin.   Bleeding: lochia minimal  PO's: taking regular diet  Voiding: without difficulty  Ambulating: yes  Passing flatus: No  Feeding: bottlefeeding    Objective  Temp:  [36.2 °C (97.1 °F)-36.6 °C (97.8 °F)] 36.2 °C (97.1 °F)  Pulse:  [76-89] 79  Resp:  [18] 18  BP: (105-124)/(61-79) 105/63  SpO2:  [94 %-95 %] 94 %    Physical Exam  General: well and resting  Chest/Breasts: nipples intact   Abdomen: normal bowel sounds, non-distended  Fundus: firm and below umbilicus  Incision: dressing clean, dry, intact  Perineum: deferred  Extremities: symmetric, calves nontender    Recent Labs     23  1050 23  0028   WBC 9.6 13.7*   RBC 4.97 4.23   HEMOGLOBIN 11.1* 9.3*   HEMATOCRIT 35.0* 29.6*   MCV 70.4* 70.0*   MCH 22.3* 22.0*   RDW 43.6 43.7   PLATELETCT 281 233   MPV 10.2 10.8   NEUTSPOLYS 63.60  --    LYMPHOCYTES 27.50  --    MONOCYTES 7.80  --    EOSINOPHILS 0.30  --    BASOPHILS 0.30  --        Assessment/Plan  Joelle Goldberg is a 23 y.o.yo  s/p postop day #4  s/p  for repeat with BTL    1. Post care: meeting all goals  2. Hemodynamics: stable  3. Pain: controlled  4. Rh+, Rubella Immune  5. Method of Feeding: plans to bottle feed  6. Method of Contraception: tubal ligation (already done)  7. Disposition: likely home postop day 3    VTE prophylaxis: none indicated

## 2023-03-26 NOTE — CARE PLAN
The patient is Stable - Low risk of patient condition declining or worsening    Shift Goals  Clinical Goals: lochia wdl, vss, pain management  Patient Goals: pain management, bond with baby  Family Goals: support    Progress made toward(s) clinical / shift goals:  Patient has remained stable this shift, initially had inadequate UO but it has improved throughout shift. Bonding with infant, requests PRN pain meds    Patient is not progressing towards the following goals: N/A

## 2023-03-26 NOTE — CARE PLAN
Problem: Pain - Standard  Goal: Alleviation of pain or a reduction in pain to the patient’s comfort goal  Outcome: Progressing     Problem: Respiratory/Oxygenation Function Post-Surgical  Goal: Patient will achieve/maintain normal respiratory rate/effort  Outcome: Progressing     The patient is Stable - Low risk of patient condition declining or worsening    Shift Goals  Clinical Goals: pain control/ work on incentive spirometer  Patient Goals:  Family Goals:    Progress made toward(s) clinical / shift goals:  Patient requests to call for pain medication when needed. Patient aware of available prn medication and available nonpharmacologic pain intervention. Patient able to care for self and infant at current pain level.  Patient educated on incentive spirometer use. Incentive spirometer effective at 1500. Patient encouraged to ambulate and use incentive ten times an hour while awake.     Patient is not progressing towards the following goals:

## 2023-03-26 NOTE — CARE PLAN
The patient is Stable - Low risk of patient condition declining or worsening    Shift Goals  Clinical Goals: pain contorol, VSS, lochia light  Patient Goals: pain mangment, bond with infant  Family Goals: support    Progress made toward(s) clinical / shift goals:  no signs of infection. Pt educated on poc.       Problem: Knowledge Deficit - Postpartum  Goal: Patient will verbalize and demonstrate understanding of self and infant care  Outcome: Progressing     Problem: Infection - Postpartum  Goal: Postpartum patient will be free of signs and symptoms of infection  Outcome: Progressing       Patient is not progressing towards the following goals:

## 2023-03-27 ENCOUNTER — PHARMACY VISIT (OUTPATIENT)
Dept: PHARMACY | Facility: MEDICAL CENTER | Age: 23
End: 2023-03-27
Payer: COMMERCIAL

## 2023-03-27 VITALS
SYSTOLIC BLOOD PRESSURE: 119 MMHG | DIASTOLIC BLOOD PRESSURE: 74 MMHG | WEIGHT: 230 LBS | HEART RATE: 73 BPM | OXYGEN SATURATION: 97 % | TEMPERATURE: 97.4 F | HEIGHT: 65 IN | RESPIRATION RATE: 16 BRPM | BODY MASS INDEX: 38.32 KG/M2

## 2023-03-27 PROCEDURE — A9270 NON-COVERED ITEM OR SERVICE: HCPCS | Performed by: BEHAVIOR ANALYST

## 2023-03-27 PROCEDURE — RXMED WILLOW AMBULATORY MEDICATION CHARGE: Performed by: STUDENT IN AN ORGANIZED HEALTH CARE EDUCATION/TRAINING PROGRAM

## 2023-03-27 PROCEDURE — 700102 HCHG RX REV CODE 250 W/ 637 OVERRIDE(OP): Performed by: BEHAVIOR ANALYST

## 2023-03-27 RX ORDER — ACETAMINOPHEN 500 MG
1000 TABLET ORAL EVERY 6 HOURS PRN
Qty: 30 TABLET | Refills: 0 | Status: SHIPPED | OUTPATIENT
Start: 2023-03-27 | End: 2023-04-07 | Stop reason: CLARIF

## 2023-03-27 RX ORDER — FERROUS SULFATE 325(65) MG
325 TABLET ORAL
Qty: 60 TABLET | Refills: 0 | Status: SHIPPED | OUTPATIENT
Start: 2023-03-27

## 2023-03-27 RX ORDER — OXYCODONE HYDROCHLORIDE 5 MG/1
5 TABLET ORAL EVERY 6 HOURS PRN
Qty: 12 TABLET | Refills: 0 | Status: SHIPPED | OUTPATIENT
Start: 2023-03-27 | End: 2023-03-30

## 2023-03-27 RX ORDER — AMOXICILLIN 250 MG
1 CAPSULE ORAL 2 TIMES DAILY PRN
Qty: 30 TABLET | Refills: 0 | Status: SHIPPED | OUTPATIENT
Start: 2023-03-27 | End: 2023-04-07 | Stop reason: CLARIF

## 2023-03-27 RX ORDER — IBUPROFEN 800 MG/1
800 TABLET ORAL EVERY 8 HOURS
Qty: 30 TABLET | Refills: 0 | Status: SHIPPED | OUTPATIENT
Start: 2023-03-27 | End: 2023-04-07 | Stop reason: CLARIF

## 2023-03-27 RX ADMIN — IBUPROFEN 800 MG: 800 TABLET, FILM COATED ORAL at 05:39

## 2023-03-27 RX ADMIN — ACETAMINOPHEN 1000 MG: 500 TABLET, FILM COATED ORAL at 00:47

## 2023-03-27 RX ADMIN — ACETAMINOPHEN 1000 MG: 500 TABLET, FILM COATED ORAL at 05:38

## 2023-03-27 ASSESSMENT — PAIN DESCRIPTION - PAIN TYPE
TYPE: ACUTE PAIN
TYPE: SURGICAL PAIN
TYPE: ACUTE PAIN;SURGICAL PAIN

## 2023-03-27 NOTE — PROGRESS NOTES
Discharge instructions reviewed and signed, all questions answered, no PIV, Meds to Beds received, car seat check complete.

## 2023-03-27 NOTE — DISCHARGE INSTRUCTIONS
Pelvic rest x6 weeks  Return for follow up visit in 1 week.  Call or come to ED for: heavy vaginal bleeding, fever >100.4, severe abdominal pain, severe headache, chest pain, shortness of breath,  N/V, incisional drainage, swelling/pain of legs, or other concerns.  PATIENT DISCHARGE EDUCATION INSTRUCTION SHEET    REASONS TO CALL YOUR OBSTETRICIAN  Persistent fever, shaking, chills (Temperature higher than 100.4) may indicate you have an infection  Heavy bleeding: soaking more than 1 pad per hour; Passing clots an egg-sized clot or bigger may mean you have an postpartum hemorrhage  Foul odor from vagina or bad smelling or discolored discharge or blood  Breast infection (Mastitis symptoms); breast pain, chills, fever, redness or red streaks, may feel flu like symptoms  Urinary pain, burning or frequency  Incision that is not healing, increased redness, swelling, tenderness or pain, or any pus from episiotomy or  site may mean you have an infection  Redness, swelling, warmth, or painful to touch in the calf area of your leg may mean you have a blood clot  Severe or intensified depression, thoughts or feelings of wanting to hurt yourself or someone else   Pain in chest, obstructed breathing or shortness of breath (trouble catching your breath) may mean you are having a postpartum complication. Call your provider immediately   Headache that does not get better, even after taking medicine, a bad headache with vision changes or pain in the upper right area of your belly may mean you have high blood pressure or post birth preeclampsia. Call your provider immediately    HAND WASHING  All family and friends should wash their hands:  Before and after holding the baby  Before feeding the baby  After using the restroom or changing the baby's diaper    WOUND CARE  Ask your physician for additional care instructions. In general:   Incision:  May shower and pat incision dry   Keep the incision clean and dry  There  should not be any opening or pus from the incision  Continue to walk at home 3 times a day   Do NOT lift anything heavier than your baby (over 10 pounds)  Encourage family to help participate in care of the  to allow rest and mom time to heal  Episiotomy/Laceration  May use kali-spray bottle, witch hazel pads and dermaplast spray for comfort  Use kali-spray bottle after urinating to cleanse perineal area  To prevent burning during urination spray kali-water bottle on labial area   Pat perineal area dry until episiotomy/laceration is healed  Continue to use kali-bottle until bleeding stops as needed  If have a 2nd degree laceration or greater, a Sitz bath can offer relief from soreness, burning, and inflammation   Sitz Bath   Sit in 6 inches of warm water and soak laceration as needed until the laceration heals    VAGINAL CARE AND BLEEDING  Nothing inside vagina for 6 weeks:   No sexual intercourse, tampons or douching  Bleeding may continue for 2-4 weeks. Amount and color may vary  Soaking 1 pad or more in an hour for several hours is considered heavy bleeding  Passing large egg sized blood clots can be concerning  If you feel like you have heavy bleeding or are having increasing amount of blood clots call your Obstetrician immediately  If you begin feeling faint upon standing, feeling sick to your stomach, have clammy skin, a really fast heartbeat, have chills, start feeling confused, dizzy, sleepy or weak, or feeling like you're going to faint call your Obstetrician immediately    HYPERTENSION   Preeclampsia or gestational hypertension are types of high blood pressure that only pregnant women can get. It is important for you to be aware of symptoms to seek early intervention and treatment. If you have any of these symptoms immediately call your Obstetrician    Vision changes or blurred vision   Severe headache or pain that is unrelieved with medication and will not go away  Persistent pain in upper abdomen  "or shoulder   Increased swelling of face, feet, or hands  Difficulty breathing or shortness of breath at rest  Urinating less than usual    URINATION AND BOWEL MOVEMENTS  Eating more fiber (bran cereal, fruits, and vegetables) and drinking plenty of fluids will help to avoid constipation  Urinary frequency and urgency after childbirth is normal  If you experience any urinary pain, burning or frequency call your provider    BIRTH CONTROL  It is possible to become pregnant at any time after delivery and while breastfeeding  Plan to discuss a method of birth control with your physician at your post delivery follow up visit    POSTPARTUM BLUES  During the first few days after birth, you may experience a sense of the \"blues\" which may include impatience, irritability or even crying. These feelings come and go quickly. However, as many as 1 in 10 women experience emotional symptoms known as postpartum depression.     POSTPARTUM DEPRESSION    May start as early as the second or third day after delivery or take several weeks or months to develop. Symptoms of \"blues\" are present, but are more intense: Crying spells; loss of appetite; feelings of hopelessness or loss of control; fear of touching the baby; over concern or no concern at all about the baby; little or no concern about your own appearance/caring for yourself; and/or inability to sleep or excessive sleeping. Contact your Obstetrician if you are experiencing any of these symptoms     PREVENTING SHAKEN BABY  If you are angry or stressed, PUT THE BABY IN THE CRIB, step away, take some deep breaths, and wait until you are calm to care for the baby. DO NOT SHAKE THE BABY. You are not alone, call a supporter for help.  [facility] 24/7 crisis call line (861-386-5778) or (1-587.376.9466)  You can also text them, text \"ANSWER\" (448413)  "

## 2023-03-27 NOTE — DISCHARGE SUMMARY
Discharge Summary:     Date of Admission: 3/24/2023  Date of Discharge: 23      Admitting diagnosis:    1. Pregnancy @ 39w1d  2. H/o C/S x3  3. Late presentation to care      Discharge Diagnosis:   1. Status post repeat C/S.  2. Postpartum anemia    Past Medical History:   Diagnosis Date    Anxiety      OB History    Para Term  AB Living   5 4 4   1 4   SAB IAB Ectopic Molar Multiple Live Births   1       0 4      # Outcome Date GA Lbr Howard/2nd Weight Sex Delivery Anes PTL Lv   5 Term 23 39w1d  2.85 kg (6 lb 4.5 oz) M CS-LTranv Spinal N OXANA   4 Term 20 39w0d  3.395 kg (7 lb 7.8 oz) M CS-LTranv Spinal N OXANA   3 Term 18 39w0d  3.61 kg (7 lb 15.3 oz) M CS-LTranv Spinal N OXANA      Birth Comments: C/Section for repeat   2 SAB 16 18w0d   M SAB  Y FD      Birth Comments: baby passed on its own in [place] at home, but D&C needed   1 Term 03/17/15 40w3d  2.863 kg (6 lb 5 oz) M CS-LTranv EPI N OXANA      Birth Comments: baby's heart rate dropped      Complications: Fetal Intolerance     Past Surgical History:   Procedure Laterality Date    ALTAF BY LAPAROSCOPY  2022    Procedure: CHOLECYSTECTOMY, LAPAROSCOPIC;  Surgeon: Cynthia Soler M.D.;  Location: SURGERY [facility];  Service: Gen Robotic    REPEAT C SECTION N/A 2020    Procedure:  SECTION, REPEAT;  Surgeon: Gladys Murcia D.O.;  Location: LABOR AND DELIVERY;  Service: Obstetrics    REPEAT C SECTION  3/11/2018    Procedure: REPEAT C SECTION;  Surgeon: Brandy Moreland M.D.;  Location: LABOR AND DELIVERY;  Service: Labor and Delivery    PRIMARY C SECTION  3/17/2015    Performed by Blue Andino M.D. at LABOR AND DELIVERY     Patient has no known allergies.    Patient Active Problem List   Diagnosis    Cholecystolithiasis affecting pregnancy in second trimester    History of  delivery x 3    Late prenatal care affecting pregnancy    GBS +    Supervision of normal intrauterine pregnancy in  multigravida in third trimester    Labor and delivery indication for care or intervention       Hospital Course:   Pt is a 23 y.o. now  who presented on 3/24/2023 for repeat C/S and bilateral salpingectomy Single male infant was delivered via repeat C/S at 39w1d. Apgars 8, 9 at 1 and 5 minutes respectively.  ml.     Postpartum course notable for early ambulation, well managed pain, tolerance of diet, spontaneous voiding, and appropriate feeding of infant. She has remained afebrile and blood pressure has been well controlled. She does have postpartum anemia but remains asymptomatic and taking iron supplement. All maternal questions and concerns addressed.     Physical Exam:  Temp:  [36.3 °C (97.4 °F)-36.8 °C (98.2 °F)] 36.3 °C (97.4 °F)  Pulse:  [73-74] 73  Resp:  [16-18] 16  BP: (113-119)/(61-74) 119/74  SpO2:  [95 %-97 %] 97 %  Physical Exam  General: well appearing, no apparent distress  CV: RRR, nl S1 and S2  Resp: Unlabored, symmetric chest rise, CTAB  Abdomen: soft, nontender, nondistended  Fundus: firm at level below umbilicus  Incision:  Mepilex dressing in place without strike-through. Dressing changed on POD2 per patient.  Perineum: Deferred  Extremities: symmetric, no peripheral edema, calves nontender    Current Facility-Administered Medications   Medication Dose    ferrous sulfate tablet 325 mg  325 mg    lactated ringers infusion      ibuprofen (MOTRIN) tablet 800 mg  800 mg    Followed by    [START ON 3/28/2023] ibuprofen (MOTRIN) tablet 800 mg  800 mg    acetaminophen (TYLENOL) tablet 1,000 mg  1,000 mg    Followed by    [START ON 3/28/2023] acetaminophen (TYLENOL) tablet 1,000 mg  1,000 mg    oxyCODONE immediate-release (ROXICODONE) tablet 5 mg  5 mg    oxyCODONE immediate release (ROXICODONE) tablet 10 mg  10 mg    ondansetron (ZOFRAN) syringe/vial injection 4 mg  4 mg    Or    ondansetron (ZOFRAN ODT) dispertab 4 mg  4 mg    diphenhydrAMINE (BENADRYL) tablet/capsule 25 mg  25 mg    Or     diphenhydrAMINE (BENADRYL) injection 25 mg  25 mg    docusate sodium (COLACE) capsule 100 mg  100 mg    NIFEdipine IR (PROCARDIA) capsule 10 mg  10 mg    bisacodyl (DULCOLAX) suppository 10 mg  10 mg    prenatal plus vitamin (STUARTNATAL 1+1) 27-1 MG tablet 1 Tablet  1 Tablet       Recent Labs     03/24/23  1050 03/25/23  0028   WBC 9.6 13.7*   RBC 4.97 4.23   HEMOGLOBIN 11.1* 9.3*   HEMATOCRIT 35.0* 29.6*   MCV 70.4* 70.0*   MCH 22.3* 22.0*   MCHC 31.7* 31.4*   RDW 43.6 43.7   PLATELETCT 281 233   MPV 10.2 10.8         Activity/ Discharge Instructions::   Discharge to home  Pelvic Rest x 6 weeks  No heavy lifting x4 weeks  Call or come to ED for: heavy vaginal bleeding, fever >100.4, severe abdominal pain, severe headache, chest pain, shortness of breath,  N/V, incisional drainage, or other concerns.       Follow up:  [facility] in 1 week for incision check and in 5-6 weeks for routine postpartum check.    Discharge Meds:   Current Outpatient Medications   Medication Sig Dispense Refill    acetaminophen (TYLENOL) 500 MG Tab Take 2 Tablets by mouth every 6 hours as needed for Moderate Pain or Mild Pain. Do not exceed 4,000 mg in a 24 hour period. 30 Tablet 0    ferrous sulfate 325 (65 Fe) MG tablet Take 1 Tablet by mouth every morning with breakfast. 60 Tablet 0    ibuprofen (MOTRIN) 800 MG Tab Take 1 Tablet by mouth every 8 hours. 30 Tablet 0    oxyCODONE immediate-release (ROXICODONE) 5 MG Tab Take 1 Tablet by mouth every 6 hours as needed for Severe Pain for up to 3 days. 12 Tablet 0    sennosides-docusate sodium (SENOKOT-S) 8.6-50 MG tablet Take 1 Tablet by mouth 2 times a day as needed (constipation). 30 Tablet 0           Chayo Osborne M.D.

## 2023-03-27 NOTE — CARE PLAN
Problem: Pain - Standard  Goal: Alleviation of pain or a reduction in pain to the patient’s comfort goal  Outcome: Progressing     Problem: Altered Physiologic Condition  Goal: Patient physiologically stable as evidenced by normal lochia, palpable uterine involution and vitals within normal limits  Outcome: Progressing     The patient is Stable - Low risk of patient condition declining or worsening    Shift Goals  Clinical Goals: pain control/ maintain firm fundus with scant to light bleeding  Patient Goals:   Family Goals:    Progress made toward(s) clinical / shift goals:  Patient requests to call for pain medication when needed. Patient aware of available prn medication and available nonpharmacologic pain intervention. Patient able to care for self and infant at current pain level.  Fundus firm. Lochia scant. Patient educated to call if saturating a pad in more than hour or for large clots.      Patient is not progressing towards the following goals:

## 2023-04-07 ENCOUNTER — POST PARTUM (OUTPATIENT)
Dept: OBGYN | Facility: CLINIC | Age: 23
End: 2023-04-07
Payer: MEDICAID

## 2023-04-07 VITALS — WEIGHT: 203 LBS | BODY MASS INDEX: 33.78 KG/M2 | SYSTOLIC BLOOD PRESSURE: 100 MMHG | DIASTOLIC BLOOD PRESSURE: 68 MMHG

## 2023-04-07 DIAGNOSIS — Z98.890 POSTOPERATIVE STATE: Primary | ICD-10-CM

## 2023-04-07 PROCEDURE — 99024 POSTOP FOLLOW-UP VISIT: CPT | Performed by: OBSTETRICS & GYNECOLOGY

## 2023-04-07 ASSESSMENT — FIBROSIS 4 INDEX: FIB4 SCORE: 0.41

## 2023-04-07 NOTE — PROGRESS NOTES
Incision Check     CC: Incision Check     HPI: 23 y.o.  s/p  delivery on 3/24/23 who presents today for routine incision check  Pt reports doing well.  Denies fevers/chills.  Denies incisional redness or drainage.  Normal lochia. Reports pinching feeling over incision occasionally, but no other complaints.    Is pumping for baby.  Feels more sentimental, but denies severe mood issues.     Vitals:    23 0857   BP: 100/68      Gen: AAO, NAD  Abd: soft, NT, ND,   Incision bandage removed, C/D/I, healing well     A/P: 23 y.o.  s/p LTCS  - no signs of postop complications  - encouraged BFing, lactation visit prn  - no signs of PP depression     RTC for routine postpartum at 6wks PP    Shahrzad Antonio M.D.

## 2023-04-07 NOTE — PROGRESS NOTES
"Patient here for C/S check.   Last seen on : 3/27  LMP : Not since delivery   Pt states she can feel \"pinching\" where her bandage is over the C/S scar.       "

## 2024-01-01 ENCOUNTER — OFFICE VISIT (OUTPATIENT)
Dept: URGENT CARE | Facility: CLINIC | Age: 24
End: 2024-01-01
Payer: MEDICAID

## 2024-01-01 VITALS
TEMPERATURE: 101.6 F | BODY MASS INDEX: 37.17 KG/M2 | OXYGEN SATURATION: 98 % | RESPIRATION RATE: 20 BRPM | HEART RATE: 109 BPM | DIASTOLIC BLOOD PRESSURE: 72 MMHG | HEIGHT: 62 IN | SYSTOLIC BLOOD PRESSURE: 118 MMHG | WEIGHT: 202 LBS

## 2024-01-01 DIAGNOSIS — R00.0 TACHYCARDIA: ICD-10-CM

## 2024-01-01 DIAGNOSIS — J02.9 SORE THROAT: ICD-10-CM

## 2024-01-01 DIAGNOSIS — R05.1 ACUTE COUGH: ICD-10-CM

## 2024-01-01 DIAGNOSIS — J10.1 INFLUENZA B: ICD-10-CM

## 2024-01-01 DIAGNOSIS — R50.9 FEVER IN ADULT: ICD-10-CM

## 2024-01-01 DIAGNOSIS — R09.89 RUNNY NOSE: ICD-10-CM

## 2024-01-01 DIAGNOSIS — R06.2 WHEEZING: ICD-10-CM

## 2024-01-01 LAB
FLUAV RNA SPEC QL NAA+PROBE: NEGATIVE
FLUBV RNA SPEC QL NAA+PROBE: POSITIVE
RSV RNA SPEC QL NAA+PROBE: NEGATIVE
S PYO DNA SPEC NAA+PROBE: NOT DETECTED
SARS-COV-2 RNA RESP QL NAA+PROBE: NEGATIVE

## 2024-01-01 PROCEDURE — 87637 SARSCOV2&INF A&B&RSV AMP PRB: CPT | Mod: QW | Performed by: NURSE PRACTITIONER

## 2024-01-01 PROCEDURE — 87651 STREP A DNA AMP PROBE: CPT | Performed by: NURSE PRACTITIONER

## 2024-01-01 PROCEDURE — 99214 OFFICE O/P EST MOD 30 MIN: CPT | Performed by: NURSE PRACTITIONER

## 2024-01-01 PROCEDURE — 3078F DIAST BP <80 MM HG: CPT | Performed by: NURSE PRACTITIONER

## 2024-01-01 PROCEDURE — 3074F SYST BP LT 130 MM HG: CPT | Performed by: NURSE PRACTITIONER

## 2024-01-01 RX ORDER — ALBUTEROL SULFATE 90 UG/1
2 AEROSOL, METERED RESPIRATORY (INHALATION) EVERY 6 HOURS PRN
Qty: 8.5 G | Refills: 0 | Status: SHIPPED | OUTPATIENT
Start: 2024-01-01

## 2024-01-01 RX ORDER — OSELTAMIVIR PHOSPHATE 75 MG/1
75 CAPSULE ORAL 2 TIMES DAILY
Qty: 10 CAPSULE | Refills: 0 | Status: SHIPPED | OUTPATIENT
Start: 2024-01-01

## 2024-01-01 ASSESSMENT — FIBROSIS 4 INDEX: FIB4 SCORE: 0.41

## 2024-01-01 NOTE — LETTER
January 1, 2024    To Whom It May Concern:         This is confirmation that Joelle Goldberg attended her scheduled appointment with LINDEN Pro on 1/01/24.  Please excuse her from work on the date of 1/2 due to a medical condition.         If you have any questions please do not hesitate to call me at the phone number listed below.    Sincerely,          LUIS ProRMaribelN.  [phone]

## 2024-01-01 NOTE — PROGRESS NOTES
"Subjective:   Joelle Goldberg is a 23 y.o. female who presents for Runny Nose, Cough, Chills, and Headache (Patient states that she has been having symptoms for 2 days and is now having chest pain. )       HPI  Patient is a pleasant 23 y.o. who presents for evaluation of 2-day history of fever, chills, runny nose, headache, and cough.  Patient is taken over-the-counter cough and cold medication with very mild relief.  States that she has had friends with similar symptoms.    ROS  All other systems are negative except as documented above within HPI.    MEDS:   Current Outpatient Medications:     ferrous sulfate 325 (65 Fe) MG tablet, Take 1 Tablet by mouth every morning with breakfast. (Patient not taking: Reported on 1/1/2024), Disp: 60 Tablet, Rfl: 0    Prenatal MV-Min-Fe Fum-FA-DHA (PRENATAL 1 PO), Take  by mouth. (Patient not taking: Reported on 1/1/2024), Disp: , Rfl:   ALLERGIES: No Known Allergies    Patient's PMH, SocHx, SurgHx, FamHx, Drug allergies and medications were reviewed.     Objective:   /72 (BP Location: Left arm, Patient Position: Sitting, BP Cuff Size: Large adult)   Pulse (!) 109   Temp (!) 38.7 °C (101.6 °F) (Temporal)   Resp 20   Ht 1.575 m (5' 2\")   Wt 91.6 kg (202 lb)   SpO2 98%   BMI 36.95 kg/m²     Physical Exam  Vitals and nursing note reviewed.   Constitutional:       General: She is awake.      Appearance: Normal appearance. She is well-developed.   HENT:      Head: Normocephalic and atraumatic.      Right Ear: Tympanic membrane, ear canal and external ear normal.      Left Ear: Tympanic membrane, ear canal and external ear normal.      Nose: Nose normal. No nasal tenderness, mucosal edema, congestion or rhinorrhea.      Right Turbinates: Enlarged.      Left Turbinates: Enlarged.      Mouth/Throat:      Lips: Pink.      Mouth: Mucous membranes are moist.      Pharynx: Oropharynx is clear. Uvula midline. Posterior oropharyngeal erythema present.   Eyes:      " Extraocular Movements: Extraocular movements intact.      Conjunctiva/sclera: Conjunctivae normal.   Cardiovascular:      Rate and Rhythm: Normal rate and regular rhythm.      Pulses: Normal pulses.      Heart sounds: Normal heart sounds.   Pulmonary:      Effort: Pulmonary effort is normal.      Breath sounds: Normal breath sounds.   Musculoskeletal:         General: Normal range of motion.      Cervical back: Normal range of motion and neck supple.   Skin:     General: Skin is warm and dry.   Neurological:      General: No focal deficit present.      Mental Status: She is alert and oriented to person, place, and time.   Psychiatric:         Mood and Affect: Mood normal.         Behavior: Behavior normal. Behavior is cooperative.         Thought Content: Thought content normal.         Judgment: Judgment normal.         Assessment/Plan:   Assessment    1. Influenza B  - oseltamivir (TAMIFLU) 75 MG Cap; Take 1 Capsule by mouth 2 times a day.  Dispense: 10 Capsule; Refill: 0    2. Fever in adult    3. Tachycardia    4. Acute cough  - albuterol 108 (90 Base) MCG/ACT Aero Soln inhalation aerosol; Inhale 2 Puffs every 6 hours as needed for Shortness of Breath.  Dispense: 8.5 g; Refill: 0  - POCT CEPHEID COV-2, FLU A/B, RSV - PCR  - POCT CEPHEID GROUP A STREP - PCR    5. Wheezing  - albuterol 108 (90 Base) MCG/ACT Aero Soln inhalation aerosol; Inhale 2 Puffs every 6 hours as needed for Shortness of Breath.  Dispense: 8.5 g; Refill: 0  - POCT CEPHEID COV-2, FLU A/B, RSV - PCR  - POCT CEPHEID GROUP A STREP - PCR    6. Sore throat  - POCT CEPHEID COV-2, FLU A/B, RSV - PCR  - POCT CEPHEID GROUP A STREP - PCR    7. Runny nose  - POCT CEPHEID COV-2, FLU A/B, RSV - PCR  - POCT CEPHEID GROUP A STREP - PCR      Vital signs stable at today's acute urgent care visit.  Begin medications as listed. Recommend continue Tylenol and Advil as needed for symptomatic relief.    Advised the patient to follow-up with the primary care provider  if symptoms persist.  Red flags discussed and indications to immediately call 911 or present to the ED. All questions were encouraged and answered to the patient's satisfaction and understanding, and they agree to the plan of care.     This is an acute problem with uncertain prognosis, medication management and instructions as well as management options were provided.  I personally reviewed prior external notes and test results pertinent to today and independently reviewed and interpreted all diagnostics, to include POCT testing. Time spent evaluating this patient includes preparing for visit, counseling/education, exam, evaluation, obtaining history, and ordering lab/test/procedures.      Please note that this dictation was created using voice recognition software. I have made a reasonable attempt to correct obvious errors, but I expect that there are errors of grammar and possibly content that I did not discover before finalizing the note.

## 2024-04-22 ENCOUNTER — OFFICE VISIT (OUTPATIENT)
Dept: URGENT CARE | Facility: CLINIC | Age: 24
End: 2024-04-22
Payer: MEDICAID

## 2024-04-22 ENCOUNTER — HOSPITAL ENCOUNTER (OUTPATIENT)
Facility: MEDICAL CENTER | Age: 24
End: 2024-04-22
Payer: MEDICAID

## 2024-04-22 VITALS
DIASTOLIC BLOOD PRESSURE: 70 MMHG | BODY MASS INDEX: 36.39 KG/M2 | HEART RATE: 89 BPM | RESPIRATION RATE: 14 BRPM | WEIGHT: 205.4 LBS | HEIGHT: 63 IN | SYSTOLIC BLOOD PRESSURE: 120 MMHG | TEMPERATURE: 97.5 F | OXYGEN SATURATION: 100 %

## 2024-04-22 DIAGNOSIS — J03.90 EXUDATIVE TONSILLITIS: ICD-10-CM

## 2024-04-22 DIAGNOSIS — J03.91 RECURRENT TONSILLITIS: ICD-10-CM

## 2024-04-22 LAB
HETEROPH AB SER QL LA: NEGATIVE
POCT INT CON NEG: NEGATIVE
POCT INT CON POS: POSITIVE
S PYO DNA SPEC NAA+PROBE: NOT DETECTED

## 2024-04-22 PROCEDURE — 3074F SYST BP LT 130 MM HG: CPT

## 2024-04-22 PROCEDURE — 3078F DIAST BP <80 MM HG: CPT

## 2024-04-22 PROCEDURE — 87077 CULTURE AEROBIC IDENTIFY: CPT | Mod: 91

## 2024-04-22 PROCEDURE — 87070 CULTURE OTHR SPECIMN AEROBIC: CPT

## 2024-04-22 PROCEDURE — 86308 HETEROPHILE ANTIBODY SCREEN: CPT

## 2024-04-22 PROCEDURE — 99213 OFFICE O/P EST LOW 20 MIN: CPT

## 2024-04-22 PROCEDURE — 87651 STREP A DNA AMP PROBE: CPT

## 2024-04-22 RX ORDER — AMOXICILLIN AND CLAVULANATE POTASSIUM 875; 125 MG/1; MG/1
1 TABLET, FILM COATED ORAL 2 TIMES DAILY
Qty: 14 TABLET | Refills: 0 | Status: SHIPPED | OUTPATIENT
Start: 2024-04-22 | End: 2024-04-29

## 2024-04-22 RX ORDER — DEXAMETHASONE SODIUM PHOSPHATE 10 MG/ML
10 INJECTION INTRAMUSCULAR; INTRAVENOUS ONCE
Status: COMPLETED | OUTPATIENT
Start: 2024-04-22 | End: 2024-04-22

## 2024-04-22 RX ORDER — PREDNISONE 10 MG/1
30 TABLET ORAL DAILY
Qty: 15 TABLET | Refills: 0 | Status: SHIPPED | OUTPATIENT
Start: 2024-04-22 | End: 2024-04-27

## 2024-04-22 RX ADMIN — DEXAMETHASONE SODIUM PHOSPHATE 10 MG: 10 INJECTION INTRAMUSCULAR; INTRAVENOUS at 10:42

## 2024-04-22 ASSESSMENT — ENCOUNTER SYMPTOMS
TROUBLE SWALLOWING: 0
ABDOMINAL PAIN: 0
SWOLLEN GLANDS: 0
HOARSE VOICE: 1
SHORTNESS OF BREATH: 0
DIARRHEA: 0
HEADACHES: 0
COUGH: 1
VOMITING: 0
STRIDOR: 0
NECK PAIN: 0

## 2024-04-22 ASSESSMENT — FIBROSIS 4 INDEX: FIB4 SCORE: 0.42

## 2024-04-22 NOTE — PROGRESS NOTES
Subjective:   Joelle Goldberg is a very pleasant 24 y.o. female who presents for:    Chief Complaint   Patient presents with    Pharyngitis     Patient is here today for swollen tonsils. States that it hurts to swallow. Patient also states cough and mucus. Symptoms for 2 weeks.        HPI:    Pharyngitis   Episode onset: two weeks ago, the patient developed swollen tonsils. The problem has been unchanged. The pain is worse on the left side. There has been no fever. Associated symptoms include congestion, coughing, ear pain and a hoarse voice. Pertinent negatives include no abdominal pain, diarrhea, drooling, ear discharge, headaches, plugged ear sensation, neck pain, shortness of breath, stridor, swollen glands, trouble swallowing or vomiting. Associated symptoms comments: Snoring and drooling in her sleep, pain with swallowing, wheezing. She has had no exposure to strep or mono. Treatments tried: Vicks, warm liquids, Advil. The treatment provided mild relief.       ROS:    Review of Systems   HENT:  Positive for congestion, ear pain and hoarse voice. Negative for drooling, ear discharge and trouble swallowing.    Respiratory:  Positive for cough. Negative for shortness of breath and stridor.    Gastrointestinal:  Negative for abdominal pain, diarrhea and vomiting.   Musculoskeletal:  Negative for neck pain.   Neurological:  Negative for headaches.       Medications:      Current Outpatient Medications   Medication Sig    predniSONE (DELTASONE) 10 MG Tab Take 3 Tablets by mouth every day for 5 days.    amoxicillin-clavulanate (AUGMENTIN) 875-125 MG Tab Take 1 Tablet by mouth 2 times a day for 7 days.    albuterol 108 (90 Base) MCG/ACT Aero Soln inhalation aerosol Inhale 2 Puffs every 6 hours as needed for Shortness of Breath. (Patient not taking: Reported on 4/22/2024)    oseltamivir (TAMIFLU) 75 MG Cap Take 1 Capsule by mouth 2 times a day. (Patient not taking: Reported on 4/22/2024)    ferrous sulfate 325  (65 Fe) MG tablet Take 1 Tablet by mouth every morning with breakfast. (Patient not taking: Reported on 2024)    Prenatal MV-Min-Fe Fum-FA-DHA (PRENATAL 1 PO) Take  by mouth. (Patient not taking: Reported on 2024)       Allergies:     No Known Allergies    Problem List:     Patient Active Problem List   Diagnosis    Cholecystolithiasis affecting pregnancy in second trimester    History of  delivery x 3    Late prenatal care affecting pregnancy    GBS +    Supervision of normal intrauterine pregnancy in multigravida in third trimester    Labor and delivery indication for care or intervention       Surgical History:    Past Surgical History:   Procedure Laterality Date    MD REMOVAL OF FALLOPIAN TUBE Bilateral 3/24/2023    Procedure: SALPINGECTOMY, OPEN;  Surgeon: Allison Rico M.D.;  Location: [facility];  Service: Labor and Delivery    REPEAT C SECTION N/A 3/24/2023    Procedure: REPEAT  SECTION;  Surgeon: Allison Rico M.D.;  Location: [facility];  Service: Labor and Delivery    ALTAF BY LAPAROSCOPY  2022    Procedure: CHOLECYSTECTOMY, LAPAROSCOPIC;  Surgeon: Cynthia Soler M.D.;  Location: SURGERY [facility];  Service: Gen Robotic    REPEAT C SECTION N/A 2020    Procedure:  SECTION, REPEAT;  Surgeon: Gladys Murcia D.O.;  Location: LAB[facility] DELIVERY;  Service: Obstetrics    REPEAT C SECTION  3/11/2018    Procedure: REPEAT C SECTION;  Surgeon: Brandy Moreland M.D.;  Location: [facility] DEL[facility]ERY;  Service: Labor and Delivery    PRIMARY C SECTION  3/17/2015    Performed by Blue Andino M.D. at LABOR AND DELIVERY       Past Social Hx:     Social History     Socioeconomic History    Marital status:    Tobacco Use    Smoking status: Former     Types: Cigarettes    Smokeless tobacco: Never   Vaping Use    Vaping Use: Never used   Substance and Sexual Activity    Alcohol use: Not Currently    Drug use: Not Currently     Sexual activity: Not Currently     Partners: Male     Birth control/protection: Condom     Comment: None        Past Family Hx:      Family History   Problem Relation Age of Onset    Arthritis Mother     Hyperlipidemia Mother     Hyperlipidemia Father     Diabetes Father     No Known Problems Brother        Problem list, medications, and allergies reviewed by myself today in Epic.     Objective:     Vitals:    04/22/24 1024   BP: 120/70   Pulse: 89   Resp: 14   Temp: 36.4 °C (97.5 °F)   SpO2: 100%       Physical Exam  Vitals reviewed.   Constitutional:       General: She is not in acute distress.     Appearance: Normal appearance. She is not ill-appearing or toxic-appearing.   HENT:      Head: Normocephalic and atraumatic.      Right Ear: Tympanic membrane, ear canal and external ear normal. No middle ear effusion. No mastoid tenderness. Tympanic membrane is not injected or bulging.      Left Ear: Tympanic membrane, ear canal and external ear normal.  No middle ear effusion. No mastoid tenderness. Tympanic membrane is not injected or bulging.      Nose: Congestion present. No rhinorrhea.      Mouth/Throat:      Lips: Pink.      Mouth: Mucous membranes are moist.      Dentition: Normal dentition.      Tongue: No lesions. Tongue does not deviate from midline.      Palate: No mass and lesions.      Pharynx: Oropharynx is clear. Uvula midline. Posterior oropharyngeal erythema present. No pharyngeal swelling, oropharyngeal exudate or uvula swelling.      Tonsils: Tonsillar exudate present. No tonsillar abscesses. 3+ on the right. 4+ on the left.   Eyes:      Extraocular Movements: Extraocular movements intact.      Conjunctiva/sclera: Conjunctivae normal.      Pupils: Pupils are equal, round, and reactive to light.   Neck:      Trachea: Trachea and phonation normal.   Cardiovascular:      Rate and Rhythm: Normal rate and regular rhythm.   Pulmonary:      Effort: Pulmonary effort is normal.      Breath sounds: Normal  breath sounds.   Abdominal:      Palpations: Abdomen is soft.   Musculoskeletal:         General: Normal range of motion.      Cervical back: Full passive range of motion without pain, normal range of motion and neck supple.   Lymphadenopathy:      Cervical: Cervical adenopathy present.   Skin:     General: Skin is warm and dry.   Neurological:      General: No focal deficit present.      Mental Status: She is alert and oriented to person, place, and time. Mental status is at baseline.             Results from POCT:   Results for orders placed or performed in visit on 04/22/24   POCT CEPHEID GROUP A STREP - PCR   Result Value Ref Range    POC Group A Strep, PCR Not Detected Not Detected, Invalid   POCT Mononucleosis (mono)   Result Value Ref Range    Heterophile Screen Negative Negative, Invalid    Internal Control Positive Positive     Internal Control Negative Negative        Assessment/Plan:     Diagnosis and associated orders:     1. Recurrent tonsillitis  - dexamethasone (Decadron) injection (check route below) 10 mg  - predniSONE (DELTASONE) 10 MG Tab; Take 3 Tablets by mouth every day for 5 days.  Dispense: 15 Tablet; Refill: 0  - POCT CEPHEID GROUP A STREP - PCR  - Referral to ENT  - POCT Mononucleosis (mono)  - CULTURE THROAT; Future    2. Exudative tonsillitis  - amoxicillin-clavulanate (AUGMENTIN) 875-125 MG Tab; Take 1 Tablet by mouth 2 times a day for 7 days.  Dispense: 14 Tablet; Refill: 0          Comments/MDM:     The patient presents to the urgent care for evaluation of tonsillar swelling that has worsened over the past 2 weeks.  The patient reports that her tonsils have become so enlarged, that she is having left-sided neck discomfort and severe odynophagia.  Due to the tonsillar swelling, she reports that she started snoring and drooling at night.  On physical exam, she has 4+ left-sided tonsillar swelling 2-3+ on the right.  No evidence of airway occlusion, peritonsillar abscess.  No dysphagia,  drooling, hot potato voice assessed in clinic.  POCT strep and mono NEGATIVE   Throat culture in progress  Based on the severity of her symptoms, the patient will be treated with Augmentin for exudative tonsillitis.  Trial of prednisone burst to decrease inflammation associated with tonsillar swelling/tonsillitis  Per the Toast trial I will administer 10 mg of dexamethasone in clinic for pharyngitis.  I discussed the risks vs benefits as well as alternatives with the patient and using shared  decision making we have elected this as a treatment modality.     Referral placed to ENT for follow-up  Supportive care discussed:  -Maintain hydration/water intake  -Nasal saline rinses 2-3 times a day   -May use short term nasal sprays, such as oxymetazoline (Afrin) to help relieve nasal discomfort, congestion, and/or pressure. Decongestant sprays should not be used longer than three consecutive days.   -Nasal rinsing with saline nasal spray or salt water (e.g., neti pot) can help relieve nasal dryness.  -Breathe Right nasal strips at night for nasal congestion  -Ponaris nasal emmollient for nasal congestion, dryness, and inflammation (do not use with iodine sensitivity)  -Cool mist humidification, chest rubs, warm tea with honey, increased fluid intake to thin secretions  -May gargle with salt water up to 4x/day as needed for throat discomfort (1 tsp salt dissolved in 1 cup warm water)  -Over-the-counter throat sprays, rest, hydration with frozen (eg, ice or popsicles) or warmed liquids, herbal tea containing licorice root, elm inner bark, marshmallow root, and licorice root aqueous dry extract, Cepacol lozenges, soft diet, honey, vitamin C, zinc lozenges, and elderberry supplements.  I considered other causes of pharyngitis including Group C, G strep, peritonsillar abscess, marisol's angina, and retropharyngeal abscess but the patient's reported symptoms and my exam do not support these alternative diagnosis based on  information I have available today.  This may change and I encouraged the patient to return to clinic if they are experiencing new symptoms or their symptoms fail to resolve with time as we cannot rule out all pathology from a single Urgent Care visit.   Return to clinic or go to the ED if symptoms worsen or fail to improve, or if patient should develop worsening/increasing sore throat, difficulty swallowing, drooling, change in voice, swollen glands, shortness of breath, ear pain, cough, congestion, fever/chills, and/or any concerning symptoms.           All questions answered. Patient verbalized understanding and is in agreement with this plan of care.     If symptoms are worsening or not improving in 3-5 days, follow-up with PCP or return to UC. Differential diagnosis, natural history, and supportive care discussed. AVS handout given and reviewed with patient. Patient educated on red flags and when to seek treatment back in ED or UC.     I personally reviewed prior external notes and test results pertinent to today's visit.  I have independently reviewed and interpreted all diagnostics ordered during this urgent care visit.     This dictation has been created using voice recognition software. The accuracy of the dictation is limited by the abilities of the software. I expect there may be some errors of grammar and possibly content. I made every attempt to manually correct the errors within my dictation. However, errors related to voice recognition software may still exist and should be interpreted within the appropriate context.    This note was electronically signed by LINDEN Fernando

## 2024-04-22 NOTE — PATIENT INSTRUCTIONS
Symptom management for a sore throat includes:     Sipping cold or warm beverages (eg, tea with honey or lemon)   Eating cold or frozen desserts (eg, ice cream, popsicles)  Sucking on ice  Sucking on hard candy, CEPACOL lozenges, or medicated throat sprays  Gargling with warm salt water -  ¼ to ½ teaspoon of salt per 8 ounces (approximately 240 mL) of warm water.  Cool mist humidification  OTC Tylenol/ibuprofen PRN for pain/fever

## 2024-04-24 LAB
BACTERIA SPEC RESP CULT: ABNORMAL
BACTERIA SPEC RESP CULT: ABNORMAL
SIGNIFICANT IND 70042: ABNORMAL
SITE SITE: ABNORMAL
SOURCE SOURCE: ABNORMAL

## 2025-02-11 ENCOUNTER — HOSPITAL ENCOUNTER (OUTPATIENT)
Facility: MEDICAL CENTER | Age: 25
End: 2025-02-11
Attending: NURSE PRACTITIONER
Payer: MEDICAID

## 2025-02-11 ENCOUNTER — OFFICE VISIT (OUTPATIENT)
Dept: URGENT CARE | Facility: CLINIC | Age: 25
End: 2025-02-11
Payer: MEDICAID

## 2025-02-11 VITALS
DIASTOLIC BLOOD PRESSURE: 78 MMHG | OXYGEN SATURATION: 98 % | SYSTOLIC BLOOD PRESSURE: 122 MMHG | TEMPERATURE: 97.3 F | HEART RATE: 92 BPM | BODY MASS INDEX: 38.09 KG/M2 | RESPIRATION RATE: 16 BRPM | WEIGHT: 215 LBS

## 2025-02-11 DIAGNOSIS — N76.0 ACUTE VAGINITIS: ICD-10-CM

## 2025-02-11 DIAGNOSIS — L28.2 PRURITIC RASH: ICD-10-CM

## 2025-02-11 PROCEDURE — 99214 OFFICE O/P EST MOD 30 MIN: CPT | Performed by: NURSE PRACTITIONER

## 2025-02-11 PROCEDURE — 3078F DIAST BP <80 MM HG: CPT | Performed by: NURSE PRACTITIONER

## 2025-02-11 PROCEDURE — 3074F SYST BP LT 130 MM HG: CPT | Performed by: NURSE PRACTITIONER

## 2025-02-11 PROCEDURE — 87510 GARDNER VAG DNA DIR PROBE: CPT

## 2025-02-11 PROCEDURE — 87480 CANDIDA DNA DIR PROBE: CPT

## 2025-02-11 PROCEDURE — 87660 TRICHOMONAS VAGIN DIR PROBE: CPT

## 2025-02-11 RX ORDER — DIPHENHYDRAMINE HCL 25 MG
25 TABLET ORAL EVERY 6 HOURS PRN
COMMUNITY

## 2025-02-11 RX ORDER — METHYLPREDNISOLONE 4 MG/1
TABLET ORAL
Qty: 21 TABLET | Refills: 0 | Status: SHIPPED | OUTPATIENT
Start: 2025-02-11 | End: 2025-02-14 | Stop reason: SDUPTHER

## 2025-02-11 ASSESSMENT — ENCOUNTER SYMPTOMS
SHORTNESS OF BREATH: 0
BACK PAIN: 0
FATIGUE: 0
CHILLS: 0
FEVER: 0
VOMITING: 0
FLANK PAIN: 0
EYE PAIN: 0
COUGH: 0

## 2025-02-11 NOTE — PROGRESS NOTES
Subjective:   Joelle Goldberg is a 24 y.o. female who presents for Rash (All over body since returning from [place] in Nov)      Rash  This is a new problem. The current episode started more than 1 month ago. The problem has been waxing and waning since onset. The rash is diffuse. The rash is characterized by redness and itchiness. It is unknown if there was an exposure to a precipitant. Pertinent negatives include no cough, eye pain, facial edema, fatigue, fever, shortness of breath or vomiting. Past treatments include antihistamine. The treatment provided no relief. There is no history of allergies.       Review of Systems   Constitutional:  Negative for chills, fatigue, fever and malaise/fatigue.   Eyes:  Negative for pain.   Respiratory:  Negative for cough and shortness of breath.    Gastrointestinal:  Negative for vomiting.   Genitourinary:  Negative for dysuria, flank pain, frequency, hematuria and urgency.   Musculoskeletal:  Negative for back pain.   Skin:  Positive for itching and rash.       Medications:    diphenhydrAMINE Tabs  methylPREDNISolone Tbpk    Allergies: Patient has no known allergies.    Problem List: Joelle Goldberg does not have any pertinent problems on file.    Surgical History:  Past Surgical History:   Procedure Laterality Date    UT REMOVAL OF FALLOPIAN TUBE Bilateral 3/24/2023    Procedure: SALPINGECTOMY, OPEN;  Surgeon: Allison Rico M.D.;  Location: [facility];  Service: Labor and Delivery    REPEAT C SECTION N/A 3/24/2023    Procedure: REPEAT  SECTION;  Surgeon: Allison Rico M.D.;  Location: [facility];  Service: Labor and Delivery    ALTAF BY LAPAROSCOPY  2022    Procedure: CHOLECYSTECTOMY, LAPAROSCOPIC;  Surgeon: Cynthia Soler M.D.;  Location: SURGERY [facility];  Service: Gen Robotic    REPEAT C SECTION N/A 2020    Procedure:  SECTION, REPEAT;  Surgeon: Gladys Murcia D.O.;  Location:  [facility];  Service: Obstetrics    REPEAT C SECTION  3/11/2018    Procedure: REPEAT C SECTION;  Surgeon: Brandy Moreland M.D.;  Location: LAB[facility] DELIVERY;  Service: Labor and Delivery    PRIMARY C SECTION  3/17/2015    Performed by Blue Andino M.D. at [facility] DEL[facility]       Past Social Hx: Joelle Goldberg  reports that she has been smoking cigarettes. She has never used smokeless tobacco. She reports that she does not currently use alcohol. She reports that she does not currently use drugs.     Past Family Hx:  Joelle Goldberg family history includes Arthritis in her mother; Diabetes in her father; Hyperlipidemia in her father and mother; No Known Problems in her brother.     Problem list, medications, and allergies reviewed by myself today in Epic.     Objective:     /78   Pulse 92   Temp 36.3 °C (97.3 °F) (Temporal)   Resp 16   Wt 97.5 kg (215 lb)   SpO2 98%   BMI 38.09 kg/m²     Physical Exam  Constitutional:       Appearance: Normal appearance. She is not ill-appearing or toxic-appearing.   HENT:      Head: Normocephalic.      Right Ear: External ear normal.      Left Ear: External ear normal.      Nose: Nose normal.      Mouth/Throat:      Lips: Pink.      Mouth: No angioedema.   Eyes:      General: Lids are normal.   Pulmonary:      Effort: Pulmonary effort is normal. No accessory muscle usage.   Musculoskeletal:      Cervical back: Full passive range of motion without pain.   Skin:     Findings: Rash present. Rash is urticarial.   Neurological:      Mental Status: She is alert and oriented to person, place, and time.   Psychiatric:         Mood and Affect: Mood normal.         Thought Content: Thought content normal.         Assessment/Plan:     Diagnosis and associated orders:     1. Acute vaginitis  VAGINAL PATHOGENS DNA PANEL      2. Pruritic rash  methylPREDNISolone (MEDROL DOSEPAK) 4 MG Tablet Therapy Pack         Comments/MDM:     I personally reviewed  prior external notes and prior test results pertinent to today's visit.  Patient having pruritic and urticarial rash on and off unknown etiology will treat with steroids.  Continue with antihistamines.  She does have vaginal itching uncertain if it is related to the rash will rule out Candida treat as necessary.  Encouraged to establish with a PCP for further evaluation  Discussed management options, risks and benefits, and alternatives to treatment plan agreed upon.   Red flags discussed and indications to immediately call 911 or present to the Emergency Department.   Supportive care, differential diagnoses, and indications for immediate follow-up discussed with patient.    Patient expresses understanding and agrees to plan. Patient denies any other questions or concerns.              Differential diagnosis, natural history, supportive care, and indications for immediate follow-up discussed.    Advised the patient to follow-up with the primary care physician for recheck, reevaluation, and consideration of further management.    Please note that this dictation was created using voice recognition software. I have made a reasonable attempt to correct obvious errors, but I expect that there are errors of grammar and possibly content that I did not discover before finalizing the note.    This note was electronically signed by Gallo LOPEZ.

## 2025-02-12 LAB
CANDIDA DNA VAG QL PROBE+SIG AMP: NEGATIVE
G VAGINALIS DNA VAG QL PROBE+SIG AMP: NEGATIVE
T VAGINALIS DNA VAG QL PROBE+SIG AMP: NEGATIVE

## 2025-02-14 DIAGNOSIS — L28.2 PRURITIC RASH: ICD-10-CM

## 2025-02-14 RX ORDER — METHYLPREDNISOLONE 4 MG/1
TABLET ORAL
Qty: 21 TABLET | Refills: 0 | Status: SHIPPED | OUTPATIENT
Start: 2025-02-14

## (undated) DEVICE — SLEEVE, VASO, THIGH, MED

## (undated) DEVICE — GLOVE BIOGEL SZ 6 PF LATEX - (50EA/BX 4BX/CA)

## (undated) DEVICE — SUCTION INSTRUMENT YANKAUER BULBOUS TIP W/O VENT (50EA/CA)

## (undated) DEVICE — PACK C-SECTION (2EA/CA)

## (undated) DEVICE — STAPLER SKIN DISP - (6/BX 10BX/CA) VISISTAT

## (undated) DEVICE — DRESSING INTERCEED ABSORBABLE ADHESION BARRIER TC7 (10EA/CA)

## (undated) DEVICE — CATHETER IV NON-SAFETY 18 GA X 1 1/4 (50/BX 4BX/CA)

## (undated) DEVICE — HEAD HOLDER JUNIOR/ADULT

## (undated) DEVICE — BLANKET UNDERBODY FULL ACCES - (5/CA)

## (undated) DEVICE — SET SUCTION/IRRIGATION WITH DISPOSABLE TIP (6/CA )PART #0250-070-520 IS A SUB

## (undated) DEVICE — TUBING CLEARLINK DUO-VENT - C-FLO (48EA/CA)

## (undated) DEVICE — CLIP HEMOLOCK PURPLE - (14/BX)

## (undated) DEVICE — GLOVE BIOGEL SZ 6.5 SURGICAL PF LTX (50PR/BX 4BX/CA)

## (undated) DEVICE — GLOVE BIOGEL INDICATOR SZ 6.5 SURGICAL PF LTX - (50PR/BX 4BX/CA)

## (undated) DEVICE — TROCAR 11 X 100 KII FIOS FIXATION 6EA/BX

## (undated) DEVICE — SCISSORS 5MM CVD (6EA/BX)

## (undated) DEVICE — CHLORAPREP 26 ML APPLICATOR - ORANGE TINT(25/CA)

## (undated) DEVICE — PAD GROUNDING PRE-JELLED - (50EA/PK)

## (undated) DEVICE — LACTATED RINGERS INJ 1000 ML - (14EA/CA 60CA/PF)

## (undated) DEVICE — SODIUM CHL IRRIGATION 0.9% 1000ML (12EA/CA)

## (undated) DEVICE — TRAY SPINAL ANESTHESIA NON-SAFETY (10/CA)

## (undated) DEVICE — DRESSING NON ADHERENT 3 X 4 - STERILE (100/BX 12BX/CA)

## (undated) DEVICE — SUTURE 4-0 MONOCRYL PLUS PS-1 - 27 INCH (36/BX)

## (undated) DEVICE — GLOVE BIOGEL ECLIPSE  PF LATEX SIZE 6.5 (50PR/BX)

## (undated) DEVICE — SPONGE GAUZESTER. 2X2 4-PL - (2/PK 50PK/BX 30BX/CS)

## (undated) DEVICE — SUTURE GENERAL

## (undated) DEVICE — CANISTER SUCTION 3000ML MECHANICAL FILTER AUTO SHUTOFF MEDI-VAC NONSTERILE LF DISP  (40EA/CA)

## (undated) DEVICE — SUTURE 1 CHROMIC GUTCT-1 27 (36PK/BX)"

## (undated) DEVICE — KIT  I.V. START (100EA/CA)

## (undated) DEVICE — SLEEVE, SEQUENTIAL CALF REG

## (undated) DEVICE — SUTURE 3-0 VICRYL PLUS CT-1 - 36 INCH (36/BX)

## (undated) DEVICE — SUTURE 2-0 CHROMIC GUT CT-1 27 (36PK/BX)"

## (undated) DEVICE — DETERGENT RENUZYME PLUS 10 OZ PACKET (50/BX)

## (undated) DEVICE — GLOVE BIOGEL PI INDICATOR SZ 7.0 SURGICAL PF LF - (50/BX 4BX/CA)

## (undated) DEVICE — SPONGE DRAIN 4 X 4IN 6-PLY - (2/PK25PK/BX12BX/CS)

## (undated) DEVICE — GLOVE SZ 7.5 BIOGEL PI MICRO - PF LF (50PR/BX)

## (undated) DEVICE — TROCAR LAPSCP 100MM 12MM NTHRD - (6/BX)

## (undated) DEVICE — COVER LIGHT HANDLE ALC PLUS DISP (18EA/BX)

## (undated) DEVICE — SUTURE 0 COATED VICRYL 6-18IN - (12PK/BX)

## (undated) DEVICE — SET EXTENSION WITH 2 PORTS (48EA/CA) ***PART #2C8610 IS A SUBSTITUTE*****

## (undated) DEVICE — DRAPESURG STERI-DRAPE LONG - (10/BX 4BX/CA)

## (undated) DEVICE — ELECTRODE DUAL RETURN W/ CORD - (50/PK)

## (undated) DEVICE — SET TUBING PNEUMOCLEAR HIGH FLOW SMOKE EVACUATION (10EA/BX)

## (undated) DEVICE — TROCAR Z THREAD 11 X 100 - BLADED (6/BX)

## (undated) DEVICE — RETRACTOR O C SECTION LRY - (5/BX)

## (undated) DEVICE — CLIP MED LG INTNL HRZN TI ESCP - (20/BX)

## (undated) DEVICE — SENSOR OXIMETER ADULT SPO2 RD SET (20EA/BX)

## (undated) DEVICE — WATER IRRIGATION STERILE 1000ML (12EA/CA)

## (undated) DEVICE — GOWN WARMING STANDARD FLEX - (30/CA)

## (undated) DEVICE — SPONGE GAUZESTER 4 X 4 4PLY - (128PK/CA)

## (undated) DEVICE — PENCIL ELECTSURG 10FT HLSTR - WITH BLADE (50EA/CA)

## (undated) DEVICE — BAG RETRIEVAL 10ML (10EA/BX)

## (undated) DEVICE — SUTURE 0 VICRYL PLUS CT 36 (36PK/BX)"

## (undated) DEVICE — SUTURE 1 CHROMIC CTX ETHICON - (36PK/BX)

## (undated) DEVICE — SUTURE 0 VICRYL PLUS CT-1 - 36 INCH (36/BX)

## (undated) DEVICE — GLOVE BIOGEL INDICATOR SZ 7SURGICAL PF LTX - (50/BX 4BX/CA)

## (undated) DEVICE — GLOVE BIOGEL SZ 8 SURGICAL PF LTX - (50PR/BX 4BX/CA)

## (undated) DEVICE — GLOVE BIOGEL SZ 7 SURGICAL PF LTX - (50PR/BX 4BX/CA)

## (undated) DEVICE — PACK ROOM TURNOVER L&D (12/CA)

## (undated) DEVICE — DRESSING POST OP BORDER 4 X 10 (5EA/BX)

## (undated) DEVICE — SET LEADWIRE 5 LEAD BEDSIDE DISPOSABLE ECG (1SET OF 5/EA)

## (undated) DEVICE — SUTURE 0 VICRYL PLUS UR-6 - 27 INCH (36/BX)

## (undated) DEVICE — GLOVE BIOGEL PI INDICATOR SZ 8.0 SURGICAL PF LF -(50/BX 4BX/CA)

## (undated) DEVICE — WATER IRRIG. STER. 1500 ML - (9/CA)

## (undated) DEVICE — TROCAR 5X100 BLADED Z-THREAD - KII (6/BX)

## (undated) DEVICE — GOWN SURGEONS X-LARGE - DISP. (30/CA)

## (undated) DEVICE — SUTURE 3-0 VICRYL PLUS SH - 8X 18 INCH (12/BX)

## (undated) DEVICE — TELFA 8 X 10 BIOSEAL - (50EA/CA)

## (undated) DEVICE — TAPE CLOTH MEDIPORE 6 INCH - (12RL/CA)

## (undated) DEVICE — CANNULA W/SEAL 5X100 Z-THRE - ADED KII (12/BX)

## (undated) DEVICE — SUTUREABS02-0 CT1 27IN - (36EA/BX)

## (undated) DEVICE — GLOVE SZ 6.5 BIOGEL PI MICRO - PF LF (50PR/BX)

## (undated) DEVICE — PACK LAP CHOLE OR - (2EA/CA)